# Patient Record
Sex: FEMALE | Race: BLACK OR AFRICAN AMERICAN | URBAN - METROPOLITAN AREA
[De-identification: names, ages, dates, MRNs, and addresses within clinical notes are randomized per-mention and may not be internally consistent; named-entity substitution may affect disease eponyms.]

---

## 2017-04-28 ENCOUNTER — OFFICE VISIT (OUTPATIENT)
Dept: ENDOCRINOLOGY | Age: 62
End: 2017-04-28

## 2017-04-28 VITALS
WEIGHT: 291 LBS | OXYGEN SATURATION: 98 % | SYSTOLIC BLOOD PRESSURE: 127 MMHG | BODY MASS INDEX: 49.68 KG/M2 | RESPIRATION RATE: 20 BRPM | HEART RATE: 77 BPM | HEIGHT: 64 IN | TEMPERATURE: 97.3 F | DIASTOLIC BLOOD PRESSURE: 60 MMHG

## 2017-04-28 DIAGNOSIS — E11.65 TYPE 2 DIABETES MELLITUS WITH HYPERGLYCEMIA, UNSPECIFIED LONG TERM INSULIN USE STATUS: Primary | ICD-10-CM

## 2017-04-28 DIAGNOSIS — E66.01 MORBID OBESITY DUE TO EXCESS CALORIES (HCC): ICD-10-CM

## 2017-04-28 DIAGNOSIS — I10 ESSENTIAL HYPERTENSION WITH GOAL BLOOD PRESSURE LESS THAN 130/80: ICD-10-CM

## 2017-04-28 DIAGNOSIS — E78.2 MIXED HYPERLIPIDEMIA: ICD-10-CM

## 2017-04-28 LAB — HBA1C MFR BLD HPLC: 6.7 %

## 2017-04-28 RX ORDER — FLUCONAZOLE 150 MG/1
150 TABLET ORAL DAILY
Qty: 1 TAB | Refills: 1 | Status: SHIPPED | OUTPATIENT
Start: 2017-04-28 | End: 2017-04-29

## 2017-04-28 RX ORDER — INSULIN LISPRO 100 [IU]/ML
INJECTION, SOLUTION INTRAVENOUS; SUBCUTANEOUS
Qty: 15 ML | Refills: 6 | Status: SHIPPED | OUTPATIENT
Start: 2017-04-28 | End: 2018-10-17 | Stop reason: SDUPTHER

## 2017-04-28 RX ORDER — INSULIN GLARGINE 100 [IU]/ML
INJECTION, SOLUTION SUBCUTANEOUS
Qty: 15 ML | Refills: 6 | Status: CANCELLED | OUTPATIENT
Start: 2017-04-28

## 2017-04-28 RX ORDER — ERGOCALCIFEROL 1.25 MG/1
50000 CAPSULE ORAL
Qty: 4 CAP | Refills: 6 | Status: SHIPPED | OUTPATIENT
Start: 2017-04-28

## 2017-04-28 NOTE — PROGRESS NOTES
HISTORY OF PRESENT ILLNESS  Guru Garcia is a 58 y.o. female. HPI  Patient here for f/u after last visit of Type 2 diabetes mellitus from Oct  2016    She saw Dr. Ezio Cuevas for back issues   She has severe DJD     She will be seeing rheumatologist for arthritis     She fell sick with viral infections  Did  get meter /log  Blood sugars are better per her             Old history :     Referred : by pcp- dr. Queen Hutchins    H/o diabetes for 12 years   Has seen Dr. Nita Pressley  Thus far       Current A1C is 11.1 % from March 2014  and symptoms/problems include none     Current diabetic medications include mix 50/50    30 units AM and 35 units PM and tradjenta . Current monitoring regimen: none  Home blood sugar records: trend: fluctuating a bit  Any episodes of hypoglycemia? yes - multiple    Weight trend: increasing steadily  Prior visit with dietician: no  Current diet: \"unhealthy\" diet in general  Current exercise: no regular exercise    Known diabetic complications: nephropathy and peripheral neuropathy  Cardiovascular risk factors: dyslipidemia, diabetes mellitus, obesity, sedentary life style, hypertension    Eye exam current (within one year): yes  SRINIVASA: no     Past Medical History:   Diagnosis Date    Asthma     DM (diabetes mellitus) ([facility] [address]     HTN (hypertension)      Past Surgical History:   Procedure Laterality Date    HX DILATION AND CURETTAGE  2011    HX TUBAL LIGATION  6/1985     Current Outpatient Prescriptions   Medication Sig    albuterol (PROAIR HFA) 90 mcg/actuation inhaler Take  by inhalation.  beclomethasone (QVAR) 40 mcg/actuation inhaler Take 1 Puff by inhalation two (2) times a day.  SITagliptin-metFORMIN (JANUMET XR) 50-1,000 mg TM24 Take 1 Tab by mouth two (2) times daily (with meals). To use with savings coupons    FARXIGA 10 mg tab TAKE 1 TABLET BY MOUTH EVERY DAY BEFORE BREAKFAST    glucose blood VI test strips (ONETOUCH VERIO) strip USE FOR TESTING FOUR TIMES DAILY.  Dx code E11.65    insulin glargine (LANTUS SOLOSTAR) 100 unit/mL (3 mL) pen 50 Units by SubCUTAneous route nightly. Stop Toujeo    insulin lispro (HUMALOG) 100 unit/mL kwikpen Increase to 8 units before breakfast, 8 units before lunch, and 10 units before dinner. Plus sliding scale. STOP MIXED INSULIN    VOLTAREN 1 % topical gel     Lancets (ONE TOUCH DELICA) misc Test 4 times daily. Dx code 250.00    ergocalciferol (VITAMIN D2) 50,000 unit capsule Take 1 Cap by mouth every seven (7) days.  meloxicam (MOBIC) 15 mg tablet Take 15 mg by mouth daily.  acetaminophen (TYLENOL) 325 mg tablet Take  by mouth every four (4) hours as needed for Pain. No current facility-administered medications for this visit. Review of Systems   Constitutional: Negative. HENT: Negative. Eyes: Negative for pain and redness. Respiratory: Negative. Cardiovascular: Negative for chest pain, palpitations and leg swelling. Gastrointestinal: Negative. Negative for constipation. Genitourinary: Negative. Musculoskeletal: Negative for myalgias. Skin: Negative. Neurological: Negative. Endo/Heme/Allergies: Negative. Psychiatric/Behavioral: Negative for depression and memory loss. The patient does not have insomnia. Physical Exam   Constitutional: She is oriented to person, place, and time. She appears well-developed and well-nourished. HENT:   Head: Normocephalic. Eyes: Conjunctivae and EOM are normal. Pupils are equal, round, and reactive to light. Neck: Normal range of motion. Neck supple. No JVD present. No tracheal deviation present. No thyromegaly present. Cardiovascular: Normal rate, regular rhythm and normal heart sounds. No murmur heard. Pulmonary/Chest: Breath sounds normal.   Abdominal: Soft. Bowel sounds are normal.   Musculoskeletal: Normal range of motion. Lymphadenopathy:     She has no cervical adenopathy. Neurological: She is alert and oriented to person, place, and time.  She has normal reflexes. Skin: Skin is warm. Psychiatric: She has a normal mood and affect. Lab Results   Component Value Date/Time    Hemoglobin A1c 6.4 10/25/2016 12:00 AM    Hemoglobin A1c 8.1 06/27/2016 12:00 AM    Hemoglobin A1c 7.9 09/16/2015 07:26 AM    Glucose 83 10/25/2016 12:00 AM    Glucose  06/16/2015 04:21 PM    Microalb/Creat ratio (ug/mg creat.) 5.6 10/25/2016 12:00 AM    LDL, calculated 97 10/25/2016 12:00 AM    Creatinine 0.78 10/25/2016 12:00 AM            ASSESSMENT and PLAN    1. Type 2 DM, uncontrolled : A1c is   6.7 %    From    April 2017   compared to   6.4 %    From oct 2016  compared to    8.1 %     From   June 2016  compared to   7.9 %     From sept 2015 compared to   8 %   From June 2015 compared to   9 %    From march 2015 compared to   8.1 %   From March 2015 compared to   7.6%     From  Dec 2014 compared to   8.3 %  From aug 2014 compared to  9.4 %   From May 2014 compared to over 11 %    Improved  glycemic control,  Stopped humalog mix 50/50  Stay  on  basal bolus in sept 2015     Body mass index is 49.95 kg/(m^2). Will try toujeo to soliqua to help her with weight loss      on farxiga to 10 mg a day with b-fast only   On  janumet xr bid send to pharmacy    Patient is advised about checking blood sugars 4 times a day and maintaining log book. The danger of having low blood sugars has been explained with inappropriate use of insulin  Patient voiced understanding and using the printed instructions at home. Hypoglycemia management has been explained to the patient. lab results and schedule of future lab studies reviewed with patient      2. Hypoglycemia :  Educated on treating the hypoglycemia. Discussed proper insulin use and prescribed    3. HTN : continue lisinopril. Patient is educated about importance of compliance with anti-hypertensives especially ARB/ACEI    4. Dyslipidemia : not on statins.  Lipids in control   Patient is educated about benefits and adverse effects of statins and explained how benefits outweigh risk. 5. use of aspirin to prevent MI and TIA's discussed      6. Diabetic complications :     A. Retinopathy-NO   educated on this complication,  regular f/u with ophthalmologist encouraged    B. Nephropathy - NO       C. Peripheral Neuropathy -   , mild   educated on this disease and indicated improvement with good and stable glycemic control    D. PAD : NO         E : CAD : NO      7. Hypothyroidism - no more a diagnosis    she is stopped from  taking  synthroid as she is euthyroid without it       8. .obesity  : Body mass index is 49.95 kg/(m^2).       Labs bnv in 3 months

## 2017-04-28 NOTE — MR AVS SNAPSHOT
Visit Information Date & Time Provider Department Dept. Phone Encounter #  
 4/28/2017  2:45 PM Akhil Damon MD [facility] [place] [facility] End[facility]ology [phone] [id number] Follow-up Instructions Return in about 3 months (around 7/28/2017). Upcoming Health Maintenance Date Due Hepatitis C Screening 1955 Pneumococcal 19-64 Medium Risk (1 of 1 - PPSV23) 2/10/1974 DTaP/Tdap/Td series (1 - Tdap) 2/10/1976 PAP AKA CERVICAL CYTOLOGY 2/10/1976 BREAST CANCER SCRN MAMMOGRAM 2/10/2005 FOBT Q 1 YEAR AGE 50-75 2/10/2005 ZOSTER VACCINE AGE 60> 2/10/2015 EYE EXAM RETINAL OR DILATED Q1 4/7/2016 FOOT EXAM Q1 6/11/2016 INFLUENZA AGE 9 TO ADULT 8/1/2016 HEMOGLOBIN A1C Q6M 4/25/2017 MICROALBUMIN Q1 10/25/2017 LIPID PANEL Q1 10/25/2017 Allergies as of 4/28/2017  Review Complete On: 4/28/2017 By: Akhil Damon MD  
 No Known Allergies Current Immunizations  Never Reviewed No immunizations on file. Not reviewed this visit You Were Diagnosed With   
  
 Codes Comments Type 2 diabetes mellitus with hyperglycemia, unspecified long term insulin use status    -  Primary ICD-10-CM: E11.65 ICD-9-CM: 250.00 Mixed hyperlipidemia     ICD-10-CM: E78.2 ICD-9-CM: 272.2 Essential hypertension with goal blood pressure less than 130/80     ICD-10-CM: I10 
ICD-9-CM: 401.9 Vitals BP Pulse Temp Resp Height(growth percentile) Weight(growth percentile) 127/60 77 97.3 °F (36.3 °C) (Oral) 20 5' 4\" (1.626 m) 291 lb (132 kg) SpO2 BMI OB Status Smoking Status 98% 49.95 kg/m2 Postmenopausal Never Smoker BMI and BSA Data Body Mass Index Body Surface Area  
 49.95 kg/m 2 2.44 m 2 Preferred Pharmacy Pharmacy Name Phone [address] AT [address] [place] [address]. [phone] Your Updated Medication List  
  
   
 This list is accurate as of: 4/28/17  3:54 PM.  Always use your most recent med list.  
  
  
  
  
 ergocalciferol 50,000 unit capsule Commonly known as:  VITAMIN D2 Take 1 Cap by mouth every seven (7) days. FARXIGA 10 mg Tab Generic drug:  dapagliflozin TAKE 1 TABLET BY MOUTH EVERY DAY BEFORE BREAKFAST  
  
 glucose blood VI test strips strip Commonly known as:  ONETOUCH VERIO  
USE FOR TESTING FOUR TIMES DAILY. Dx code E11.65  
  
 insulin glargine 100 unit/mL (3 mL) pen Commonly known as:  LANTUS SOLOSTAR  
50 Units by SubCUTAneous route nightly. Stop Toujeo  
  
 insulin lispro 100 unit/mL kwikpen Commonly known as:  HUMALOG Increase to 8 units before breakfast, 8 units before lunch, and 10 units before dinner. Plus sliding scale. STOP MIXED INSULIN Lancets Misc Commonly known as: One Touch Zollie Right Test 4 times daily. Dx code 250.00  
  
 meloxicam 15 mg tablet Commonly known as:  MOBIC Take 15 mg by mouth daily. PROAIR HFA 90 mcg/actuation inhaler Generic drug:  albuterol Take  by inhalation. QVAR 40 mcg/actuation ReliSen Generic drug:  beclomethasone Take 1 Puff by inhalation two (2) times a day. SITagliptin-metFORMIN 50-1,000 mg Tm24 Commonly known as:  JANUMET XR Take 1 Tab by mouth two (2) times daily (with meals). To use with savings coupons TYLENOL 325 mg tablet Generic drug:  acetaminophen Take  by mouth every four (4) hours as needed for Pain. VOLTAREN 1 % Gel Generic drug:  diclofenac We Performed the Following AMB POC HEMOGLOBIN A1C [63667 CPT(R)] Follow-up Instructions Return in about 3 months (around 7/28/2017). Patient Instructions Diflucan 150 mg 1 pill a day ( 2 refills ) 
 
 farxiga 10 mg before b-fast  
 
 janumet xr 50/ 1gm twice a day with meals Check blood sugars immediately before each meal and at bedtime ( print and mail ) Take  Tuojeo or Lantus  insulin  50 units  at bed time Start on  [place] at 20 units and come to 50 units ,  Stop Toujeo  
 
 
 humalog  insulin 8 units before breakfast, 8  units before lunch and 10  units before dinner. Also, add additional humalog  as follows with meals  If blood sugars are[de-identified] 
 
150-200 mg 1 units 201-250 mg 3 units 251-300 mg 5 units 301-350 mg 7 units 351-400 mg 9 units 401-450 mg 11 units 451-500 mg  13 units Less than 70 mg NO INSULIN 
 
 
 
 
Do not skip meals Do not eat in between meals Reduce carbs- pasta, rice, potatoes, bread Do not drink juices or sodas Donot eat peanut butter Do not eat muffins, biscuits Do not eat sugar free cookies and cakes Do not eat peaches, grapes, oranges and pine apples Introducing [facility] & HEALTH SERVICES! [place] [employer] introduces [employer] patient portal. Now you can access parts of your medical record, email your doctor's office, and request medication refills online. 1. In your internet browser, go to [email or web address] 2. Click on the First Time User? Click Here link in the Sign In box. You will see the New Member Sign Up page. 3. Enter your [employer] Access Code exactly as it appears below. You will not need to use this code after youve completed the sign-up process. If you do not sign up before the expiration date, you must request a new code. · [employer] Access Code: [email or web address] Expires: 7/27/2017  3:54 PM 
 
4. Enter the last four digits of your Social Security Number (xxxx) and Date of Birth (mm/dd/yyyy) as indicated and click Submit. You will be taken to the next sign-up page. 5. Create a BlogRadiot ID. This will be your [employer] login ID and cannot be changed, so think of one that is secure and easy to remember. 6. Create a [employer] password. You can change your password at any time. 7. Enter your Password Reset Question and Answer.  This can be used at a later time if you forget your password. 8. Enter your e-mail address. You will receive e-mail notification when new information is available in [address] 9. Click Sign Up. You can now view and download portions of your medical record. 10. Click the Download Summary menu link to download a portable copy of your medical information. If you have questions, please visit the Frequently Asked Questions section of the Pfenex website. Remember, P[employer] is NOT to be used for urgent needs. For medical emergencies, dial 911. Now available from your iPhone and Android! Please provide this summary of care documentation to your next provider. Your primary care clinician is listed as Arron Fountain. If you have any questions after today's visit, please call [phone].

## 2017-04-28 NOTE — PATIENT INSTRUCTIONS
Diflucan 150 mg 1 pill a day ( 2 refills )     farxiga 10 mg before b-fast      janumet xr 50/ 1gm twice a day with meals       Check blood sugars immediately before each meal and at bedtime ( print and mail )      Take  Tuojeo or Lantus  insulin  50 units  at bed time  Start on  Soliqua at 20 units and come to 50 units ,  Stop Toujeo        humalog  insulin 8 units before breakfast, 8  units before lunch and 10  units before dinner.     Also, add additional humalog  as follows with meals  If blood sugars are[de-identified]    150-200 mg 1 units    201-250 mg 3 units    251-300 mg 5 units    301-350 mg 7 units    351-400 mg 9 units    401-450 mg 11 units    451-500 mg  13 units     Less than 70 mg NO INSULIN          Do not skip meals  Do not eat in between meals    Reduce carbs- pasta, rice, potatoes, bread   Do not drink juices or sodas  Donot eat peanut butter   Do not eat muffins, biscuits   Do not eat sugar free cookies and cakes   Do not eat peaches, grapes, oranges and pine apples

## 2017-04-28 NOTE — PROGRESS NOTES
Wt Readings from Last 3 Encounters:   04/28/17 291 lb (132 kg)   10/28/16 290 lb (131.5 kg)   06/30/16 291 lb (132 kg)     Temp Readings from Last 3 Encounters:   04/28/17 97.3 °F (36.3 °C) (Oral)   10/28/16 98.3 °F (36.8 °C)   06/30/16 98.1 °F (36.7 °C) (Oral)     BP Readings from Last 3 Encounters:   04/28/17 127/60   10/28/16 127/53   06/30/16 136/63     Pulse Readings from Last 3 Encounters:   04/28/17 77   10/28/16 82   06/30/16 99     Lab Results   Component Value Date/Time    Hemoglobin A1c 6.4 10/25/2016 12:00 AM    Hemoglobin A1c (POC) 8.1 03/13/2015 01:50 PM     Last Podiatry April 2017  Last Eye exam Feb 2016

## 2017-08-29 ENCOUNTER — OFFICE VISIT (OUTPATIENT)
Dept: ENDOCRINOLOGY | Age: 62
End: 2017-08-29

## 2017-08-29 VITALS
HEART RATE: 70 BPM | HEIGHT: 64 IN | DIASTOLIC BLOOD PRESSURE: 55 MMHG | BODY MASS INDEX: 48.49 KG/M2 | SYSTOLIC BLOOD PRESSURE: 121 MMHG | WEIGHT: 284 LBS | TEMPERATURE: 97.7 F | RESPIRATION RATE: 14 BRPM

## 2017-08-29 DIAGNOSIS — E78.2 MIXED HYPERLIPIDEMIA: ICD-10-CM

## 2017-08-29 DIAGNOSIS — I10 ESSENTIAL HYPERTENSION WITH GOAL BLOOD PRESSURE LESS THAN 130/80: ICD-10-CM

## 2017-08-29 DIAGNOSIS — E11.65 TYPE 2 DIABETES MELLITUS WITH HYPERGLYCEMIA, UNSPECIFIED LONG TERM INSULIN USE STATUS: Primary | ICD-10-CM

## 2017-08-29 RX ORDER — FOLIC ACID 1 MG/1
TABLET ORAL DAILY
COMMUNITY
End: 2022-05-12 | Stop reason: ALTCHOICE

## 2017-08-29 RX ORDER — HYDROXYCHLOROQUINE SULFATE 200 MG/1
TABLET, FILM COATED ORAL
COMMUNITY
Start: 2017-05-04 | End: 2019-05-06 | Stop reason: ALTCHOICE

## 2017-08-29 RX ORDER — ALBUTEROL SULFATE 90 UG/1
AEROSOL, METERED RESPIRATORY (INHALATION)
COMMUNITY

## 2017-08-29 RX ORDER — ACETAMINOPHEN AND CODEINE PHOSPHATE 300; 30 MG/1; MG/1
1 TABLET ORAL
COMMUNITY
Start: 2017-08-07 | End: 2019-05-06 | Stop reason: ALTCHOICE

## 2017-08-29 RX ORDER — METHOTREXATE 2.5 MG/1
2.5 TABLET ORAL
COMMUNITY

## 2017-08-29 NOTE — MR AVS SNAPSHOT
Visit Information Date & Time Provider Department Dept. Phone Encounter #  
 8/29/2017 10:15 AM Sukhdev Carpenter MD [facility] [phone] [id number] Follow-up Instructions Return in about 6 months (around 2/28/2018). Upcoming Health Maintenance Date Due Hepatitis C Screening 1955 Pneumococcal 19-64 Medium Risk (1 of 1 - PPSV23) 2/10/1974 DTaP/Tdap/Td series (1 - Tdap) 2/10/1976 PAP AKA CERVICAL CYTOLOGY 2/10/1976 BREAST CANCER SCRN MAMMOGRAM 2/10/2005 FOBT Q 1 YEAR AGE 50-75 2/10/2005 ZOSTER VACCINE AGE 60> 12/10/2014 EYE EXAM RETINAL OR DILATED Q1 4/7/2016 FOOT EXAM Q1 6/11/2016 INFLUENZA AGE 9 TO ADULT 8/1/2017 HEMOGLOBIN A1C Q6M 2/24/2018 MICROALBUMIN Q1 8/24/2018 LIPID PANEL Q1 8/24/2018 Allergies as of 8/29/2017  Review Complete On: 8/29/2017 By: Sukhdev Carpenter MD  
  
 Severity Noted Reaction Type Reactions Penicillins High 06/13/2017    Itching Tramadol High 06/13/2017    Hives, Itching Current Immunizations  Never Reviewed No immunizations on file. Not reviewed this visit You Were Diagnosed With   
  
 Codes Comments Type 2 diabetes mellitus with hyperglycemia, unspecified long term insulin use status (HCC)    -  Primary ICD-10-CM: E11.65 ICD-9-CM: 250.00 Essential hypertension with goal blood pressure less than 130/80     ICD-10-CM: I10 
ICD-9-CM: 401.9 Mixed hyperlipidemia     ICD-10-CM: E78.2 ICD-9-CM: 272.2 Vitals BP Pulse Temp Resp Height(growth percentile) Weight(growth percentile) 121/55 (BP 1 Location: Left arm, BP Patient Position: Sitting) 70 97.7 °F (36.5 °C) (Oral) 14 5' 4\" (1.626 m) 284 lb (128.8 kg) BMI OB Status Smoking Status 48.75 kg/m2 Postmenopausal Never Smoker BMI and BSA Data Body Mass Index Body Surface Area 48.75 kg/m 2 2.41 m 2 Preferred Pharmacy Pharmacy Name Phone [address] AT [address] [place] [address]. [phone] Your Updated Medication List  
  
   
This list is accurate as of: 8/29/17 11:17 AM.  Always use your most recent med list.  
  
  
  
  
 acetaminophen-codeine 300-30 mg per tablet Commonly known as:  TYLENOL #3 Take 1 Tab by mouth every six (6) hours as needed. * ergocalciferol 50,000 unit capsule Commonly known as:  VITAMIN D2 Take 1 Cap by mouth every seven (7) days. * ergocalciferol 50,000 unit capsule Commonly known as:  DRISDOL Take 1 Cap by mouth every seven (7) days. FARXIGA 10 mg Tab Generic drug:  dapagliflozin TAKE 1 TABLET BY MOUTH EVERY DAY BEFORE BREAKFAST  
  
 folic acid 1 mg tablet Commonly known as:  Google Take  by mouth daily. hydroxychloroquine 200 mg tablet Commonly known as:  PLAQUENIL  
TK 1 T PO Q 12 H  
  
 insulin glargine-lixisenatide 100 unit-33 mcg/mL Inpn Commonly known as:  SOLIQUA 100/33  
50 Units by SubCUTAneous route nightly. insulin lispro 100 unit/mL kwikpen Commonly known as:  HUMALOG Increase to 8 units before breakfast, 8 units before lunch, and 10 units before dinner. Plus sliding scale. STOP MIXED INSULIN Lancets Misc Commonly known as: One Touch Néstor Buttery Test 4 times daily. Dx code 250.00  
  
 meloxicam 15 mg tablet Commonly known as:  MOBIC Take 15 mg by mouth daily. methotrexate 2.5 mg tablet Commonly known as:  [place] Glance Take 12.5 mg by mouth every Monday. ONETOUCH VERIO strip Generic drug:  glucose blood VI test strips USE FOR TESTING FOUR TIMES DAILY PROAIR HFA 90 mcg/actuation inhaler Generic drug:  albuterol Take  by inhalation. QVAR 40 mcg/actuation [employer] Generic drug:  beclomethasone Take 1 Puff by inhalation two (2) times a day. SITagliptin-metFORMIN 50-1,000 mg Tm24 Commonly known as:  JANUMET XR  
 Take 1 Tab by mouth two (2) times daily (with meals). To use with savings coupons TYLENOL 325 mg tablet Generic drug:  acetaminophen Take  by mouth every four (4) hours as needed for Pain. VOLTAREN 1 % Gel Generic drug:  diclofenac * Notice: This list has 2 medication(s) that are the same as other medications prescribed for you. Read the directions carefully, and ask your doctor or other care provider to review them with you. Prescriptions Sent to Pharmacy Refills  
 insulin glargine-lixisenatide (SOLIQUA 100/33) 100 unit-33 mcg/mL inpn 6 Si Units by SubCUTAneous route nightly. Class: Normal  
 Pharmacy: [facility] [address], [address] [place] [address] AT [address] [facility]. Ph #: [phone] Route: SubCUTAneous Follow-up Instructions Return in about 6 months (around 2018). Patient Instructions   
 
 
 
 farxiga 10 mg before b-fast ( might need change to jardiance 25 mg a day - pt will call ) 
 
 janumet xr 50/ 1gm twice a day with meals Check blood sugars immediately before each meal and at bedtime ( print and mail ) Take  Lantus  insulin  50 units  at bed time OR Soliqua   50   At bed time  
 
humalog  insulin 8 units before breakfast, 8  units before lunch and 10  units before dinner. Also, add additional humalog  as follows with meals  If blood sugars are[de-identified] 
 
150-200 mg 1 units 201-250 mg 3 units 251-300 mg 5 units 301-350 mg 7 units 351-400 mg 9 units 401-450 mg 11 units 451-500 mg  13 units Less than 70 mg NO INSULIN 
 
 
 
 
Do not skip meals Do not eat in between meals Reduce carbs- pasta, rice, potatoes, bread Do not drink juices or sodas Donot eat peanut butter Do not eat muffins, biscuits Do not eat sugar free cookies and cakes Do not eat peaches, grapes, oranges and pine apples Introducing [facility] & HE[facility] SERVICES! [employer] introduces [employer] patient portal. Now you can access parts of your medical record, email your doctor's office, and request medication refills online. 1. In your internet browser, go to [email or web address] 2. Click on the First Time User? Click Here link in the Sign In box. You will see the New Member Sign Up page. 3. Enter your [employer]en Access Code exactly as it appears below. You will not need to use this code after youve completed the sign-up process. If you do not sign up before the expiration date, you must request a new code. · [employer] Access Code: [email or web address] Expires: 11/27/2017 11:17 AM 
 
4. Enter the last four digits of your Social Security Number (xxxx) and Date of Birth (mm/dd/yyyy) as indicated and click Submit. You will be taken to the next sign-up page. 5. Create a Sweeten ID. This will be your Sweeten login ID and cannot be changed, so think of one that is secure and easy to remember. 6. Create a Sweeten password. You can change your password at any time. 7. Enter your Password Reset Question and Answer. This can be used at a later time if you forget your password. 8. Enter your e-mail address. You will receive e-mail notification when new information is available in [address] 9. Click Sign Up. You can now view and download portions of your medical record. 10. Click the Download Summary menu link to download a portable copy of your medical information. If you have questions, please visit the Frequently Asked Questions section of the Sweeten website. Remember, [employer] is NOT to be used for urgent needs. For medical emergencies, dial 911. Now available from your iPhone and Android! Please provide this summary of care documentation to your next provider. Your primary care clinician is listed as Violeta Pierre. If you have any questions after today's visit, please call [phone].

## 2017-08-29 NOTE — PROGRESS NOTES
Wt Readings from Last 3 Encounters:   08/29/17 284 lb (128.8 kg)   04/28/17 291 lb (132 kg)   10/28/16 290 lb (131.5 kg)     Temp Readings from Last 3 Encounters:   08/29/17 97.7 °F (36.5 °C) (Oral)   04/28/17 97.3 °F (36.3 °C) (Oral)   10/28/16 98.3 °F (36.8 °C)     BP Readings from Last 3 Encounters:   08/29/17 121/55   04/28/17 127/60   10/28/16 127/53     Pulse Readings from Last 3 Encounters:   08/29/17 70   04/28/17 77   10/28/16 82     Lab Results   Component Value Date/Time    Hemoglobin A1c 6.7 08/24/2017 12:00 AM    Hemoglobin A1c (POC) 6.7 04/28/2017 03:34 PM

## 2017-08-29 NOTE — PROGRESS NOTES
HISTORY OF PRESENT ILLNESS  Ashtyn Ohara is a 58 y.o. female. HPI  Patient here for f/u after last visit of Type 2 diabetes mellitus from April 2017       She has severe DJD   She is diagnosed with RA    She fell sick with viral infections  Did  get meter /log    Lost 7 lbs   Blood sugars are better             Old history :     Referred : by pcp- dr. Berny Lopez    H/o diabetes for 12 years   Has seen Dr. Adrianne Schuster  Thus far       Current A1C is 11.1 % from March 2014  and symptoms/problems include none     Current diabetic medications include mix 50/50    30 units AM and 35 units PM and tradjenta . Current monitoring regimen: none  Home blood sugar records: trend: fluctuating a bit  Any episodes of hypoglycemia? yes - multiple    Weight trend: increasing steadily  Prior visit with dietician: no  Current diet: \"unhealthy\" diet in general  Current exercise: no regular exercise    Known diabetic complications: nephropathy and peripheral neuropathy  Cardiovascular risk factors: dyslipidemia, diabetes mellitus, obesity, sedentary life style, hypertension    Eye exam current (within one year): yes  SRINIVASA: no     Past Medical History:   Diagnosis Date    Asthma     DM (diabetes mellitus) ([facility] [address]     HTN (hypertension)      Past Surgical History:   Procedure Laterality Date    HX CARPAL TUNNEL RELEASE      both hands    HX DILATION AND CURETTAGE  2011    HX TUBAL LIGATION  6/1985     Current Outpatient Prescriptions   Medication Sig    albuterol (PROAIR HFA) 90 mcg/actuation inhaler Take  by inhalation.  hydroxychloroquine (PLAQUENIL) 200 mg tablet TK 1 T PO Q 12 H    folic acid (FOLVITE) 1 mg tablet Take  by mouth daily.  methotrexate (RHEUMATREX) 2.5 mg tablet Take 12.5 mg by mouth every Monday.  acetaminophen-codeine (TYLENOL #3) 300-30 mg per tablet Take 1 Tab by mouth every six (6) hours as needed.     ONETOUCH VERIO strip USE FOR TESTING FOUR TIMES DAILY    LANTUS SOLOSTAR 100 unit/mL (3 mL) inpn ADMINISTER 50 UNITS UNDER THE SKIN EVERY EVENING. STOP TOUJEO    FARXIGA 10 mg tab TAKE 1 TABLET BY MOUTH EVERY DAY BEFORE BREAKFAST    insulin lispro (HUMALOG) 100 unit/mL kwikpen Increase to 8 units before breakfast, 8 units before lunch, and 10 units before dinner. Plus sliding scale. STOP MIXED INSULIN    ergocalciferol (DRISDOL) 50,000 unit capsule Take 1 Cap by mouth every seven (7) days.  beclomethasone (QVAR) 40 mcg/actuation inhaler Take 1 Puff by inhalation two (2) times a day.  SITagliptin-metFORMIN (JANUMET XR) 50-1,000 mg TM24 Take 1 Tab by mouth two (2) times daily (with meals). To use with savings coupons    VOLTAREN 1 % topical gel     Lancets (ONE TOUCH DELICA) misc Test 4 times daily. Dx code 250.00    acetaminophen (TYLENOL) 325 mg tablet Take  by mouth every four (4) hours as needed for Pain.  ergocalciferol (VITAMIN D2) 50,000 unit capsule Take 1 Cap by mouth every seven (7) days.  meloxicam (MOBIC) 15 mg tablet Take 15 mg by mouth daily. No current facility-administered medications for this visit. Review of Systems   Constitutional: Negative. HENT: Negative. Eyes: Negative for pain and redness. Respiratory: Negative. Cardiovascular: Negative for chest pain, palpitations and leg swelling. Gastrointestinal: Negative. Negative for constipation. Genitourinary: Negative. Musculoskeletal: Negative for myalgias. Skin: Negative. Neurological: Negative. Endo/Heme/Allergies: Negative. Psychiatric/Behavioral: Negative for depression and memory loss. The patient does not have insomnia. Physical Exam   Constitutional: She is oriented to person, place, and time. She appears well-developed and well-nourished. HENT:   Head: Normocephalic. Eyes: Conjunctivae and EOM are normal. Pupils are equal, round, and reactive to light. Neck: Normal range of motion. Neck supple. No JVD present. No tracheal deviation present. No thyromegaly present. Cardiovascular: Normal rate, regular rhythm and normal heart sounds. No murmur heard. Pulmonary/Chest: Breath sounds normal.   Abdominal: Soft. Bowel sounds are normal.   Musculoskeletal: Normal range of motion. Lymphadenopathy:     She has no cervical adenopathy. Neurological: She is alert and oriented to person, place, and time. She has normal reflexes. Skin: Skin is warm. Psychiatric: She has a normal mood and affect. Lab Results   Component Value Date/Time    Hemoglobin A1c 6.7 08/24/2017 12:00 AM    Hemoglobin A1c 6.4 10/25/2016 12:00 AM    Hemoglobin A1c 8.1 06/27/2016 12:00 AM    Glucose 84 08/24/2017 12:00 AM    Glucose  06/16/2015 04:21 PM    Microalb/Creat ratio (ug/mg creat.) 6.0 08/24/2017 12:00 AM    LDL, calculated 89 08/24/2017 12:00 AM    Creatinine 0.74 08/24/2017 12:00 AM            ASSESSMENT and PLAN    1. Type 2 DM, uncontrolled : A1c is   6.7 %    From  Aug 2017  Compared to  6.4 %     From    April 2017   compared to   6.4 %    From oct 2016  compared to    8.1 %     From   June 2016  compared to   7.9 %     From sept 2015 compared to   8 %   From June 2015 compared to   9 %    From march 2015 compared to   8.1 %   From March 2015 compared to   7.6%     From  Dec 2014 compared to   8.3 %  From aug 2014 compared to  9.4 %   From May 2014 compared to over 11 %    Improved  glycemic control,  Stopped humalog mix 50/50  Stay  on  basal bolus in sept 2015     Body mass index is 48.75 kg/(m^2). Will try toujeo to soliqua to help her with weight loss      on farxiga to 10 mg a day with b-fast only   On  janumet xr bid send to pharmacy    Patient is advised about checking blood sugars 4 times a day and maintaining log book. The danger of having low blood sugars has been explained with inappropriate use of insulin  Patient voiced understanding and using the printed instructions at home. Hypoglycemia management has been explained to the patient.        lab results and schedule of future lab studies reviewed with patient      2. Hypoglycemia :  Educated on treating the hypoglycemia. Discussed proper insulin use and prescribed    3. HTN : continue lisinopril. Patient is educated about importance of compliance with anti-hypertensives especially ARB/ACEI    4. Dyslipidemia : not on statins. Lipids in control   Patient is educated about benefits and adverse effects of statins and explained how benefits outweigh risk. 5. use of aspirin to prevent MI and TIA's discussed      6. Diabetic complications :     A. Retinopathy-NO   educated on this complication,  regular f/u with ophthalmologist encouraged    B. Nephropathy - NO       C. Peripheral Neuropathy -   , mild   educated on this disease and indicated improvement with good and stable glycemic control    D. PAD : NO         E : CAD : NO      7. Hypothyroidism - no more a diagnosis    she is stopped from  taking  synthroid as she is euthyroid without it       8. .obesity  : Body mass index is 48.75 kg/(m^2).       Labs bnv in 3 months

## 2017-08-29 NOTE — PATIENT INSTRUCTIONS
farxiga 10 mg before b-fast ( might need change to jardiance 25 mg a day - pt will call )     janumet xr 50/ 1gm twice a day with meals       Check blood sugars immediately before each meal and at bedtime ( print and mail )      Take  Lantus  insulin  50 units  at bed time  OR  Soliqua   50   At bed time     humalog  insulin 8 units before breakfast, 8  units before lunch and 10  units before dinner.     Also, add additional humalog  as follows with meals  If blood sugars are[de-identified]    150-200 mg 1 units    201-250 mg 3 units    251-300 mg 5 units    301-350 mg 7 units    351-400 mg 9 units    401-450 mg 11 units    451-500 mg  13 units     Less than 70 mg NO INSULIN          Do not skip meals  Do not eat in between meals    Reduce carbs- pasta, rice, potatoes, bread   Do not drink juices or sodas  Donot eat peanut butter   Do not eat muffins, biscuits   Do not eat sugar free cookies and cakes   Do not eat peaches, grapes, oranges and pine apples

## 2017-09-07 ENCOUNTER — TELEPHONE (OUTPATIENT)
Dept: ENDOCRINOLOGY | Age: 62
End: 2017-09-07

## 2017-09-08 NOTE — TELEPHONE ENCOUNTER
Spoke with patient and informed her PA was denied for [place]. Informed patient she can savings card to get medication. Patient stated she was given a savings card at office visit but thought it had to be approved through insurance first. Explained to patient she needs to activate card and take to pharmacy and can get medication. She verbalized understanding. Patient will call back with any issues getting medication.

## 2017-10-19 ENCOUNTER — TELEPHONE (OUTPATIENT)
Dept: ENDOCRINOLOGY | Age: 62
End: 2017-10-19

## 2017-11-08 ENCOUNTER — TELEPHONE (OUTPATIENT)
Dept: ENDOCRINOLOGY | Age: 62
End: 2017-11-08

## 2017-11-08 NOTE — TELEPHONE ENCOUNTER
Can she wait until march visit, if not to pre pone to Blane Hernández   I believe she can wait for this until march 2018

## 2017-11-08 NOTE — TELEPHONE ENCOUNTER
Spoke with patient and she stated she was recently diagnosed with rheumatoid arthritis. She saw her foot Dr. Petty Sagastume and he told her she qualifies for diabetic shoes and told her to contact the physician who treats diabetes in regards to this. Patient is asking what needs to be done in order to get the diabetic shoes. Patient does not have f/u until March. Please advise.

## 2017-11-09 ENCOUNTER — TELEPHONE (OUTPATIENT)
Dept: ENDOCRINOLOGY | Age: 62
End: 2017-11-09

## 2017-11-09 NOTE — TELEPHONE ENCOUNTER
Attempted to call patient in regards to speaking with pharmacy and her medication. Left message and call back number.

## 2017-11-09 NOTE — TELEPHONE ENCOUNTER
Spoke with patient and she stated she is trying to get the diabetic shoes now because she has met deductible and could get them at no cost. Appointment made for 12/11/17 at 1115. Patient also stated was not able to get prescriptions from pharmacy last night. Trying to use savings cards for [employer] and [address] and pharmacist told her they were not going through. Will call pharmacy and update patient sometime today or tomorrow. Patient verbalized understanding.

## 2017-12-11 ENCOUNTER — OFFICE VISIT (OUTPATIENT)
Dept: ENDOCRINOLOGY | Age: 62
End: 2017-12-11

## 2017-12-11 VITALS
HEART RATE: 88 BPM | SYSTOLIC BLOOD PRESSURE: 147 MMHG | DIASTOLIC BLOOD PRESSURE: 66 MMHG | WEIGHT: 290.8 LBS | TEMPERATURE: 97.3 F | HEIGHT: 64 IN | RESPIRATION RATE: 16 BRPM | OXYGEN SATURATION: 98 % | BODY MASS INDEX: 49.64 KG/M2

## 2017-12-11 DIAGNOSIS — E78.2 MIXED HYPERLIPIDEMIA: ICD-10-CM

## 2017-12-11 DIAGNOSIS — I10 ESSENTIAL HYPERTENSION WITH GOAL BLOOD PRESSURE LESS THAN 130/80: ICD-10-CM

## 2017-12-11 DIAGNOSIS — E11.65 TYPE 2 DIABETES MELLITUS WITH HYPERGLYCEMIA, UNSPECIFIED LONG TERM INSULIN USE STATUS: Primary | ICD-10-CM

## 2017-12-11 DIAGNOSIS — E66.01 OBESITY, MORBID (HCC): ICD-10-CM

## 2017-12-11 NOTE — PROGRESS NOTES
Chief Complaint   Patient presents with    Diabetes     pt stated she is here to complete a foot exam for some dm shoes     1. Have you been to the ER, urgent care clinic since your last visit? Hospitalized since your last visit? No    2. Have you seen or consulted any other health care providers outside of the [address] [place] [address] since your last visit? Include any pap smears or colon screening.  No       Wt Readings from Last 3 Encounters:   12/11/17 290 lb 12.8 oz (131.9 kg)   08/29/17 284 lb (128.8 kg)   04/28/17 291 lb (132 kg)     Temp Readings from Last 3 Encounters:   12/11/17 97.3 °F (36.3 °C) (Oral)   08/29/17 97.7 °F (36.5 °C) (Oral)   04/28/17 97.3 °F (36.3 °C) (Oral)     BP Readings from Last 3 Encounters:   12/11/17 147/66   08/29/17 121/55   04/28/17 127/60     Pulse Readings from Last 3 Encounters:   12/11/17 88   08/29/17 70   04/28/17 77     Pt do not have meter  Pt had a foot and eye exam this year

## 2017-12-11 NOTE — MR AVS SNAPSHOT
[address] [facility] [address] [facility] [address] 
[phone] Patient: Alonso Cruz MRN: [id number] SNT:[id number] Visit Information Date & Time Provider Department Dept. Phone Encounter #  
 12/11/2017 11:15 AM Kenya Junior MD [facility] [phone] [id number] Your Appointments 3/2/2018  3:45 PM  
ROUTINE CARE with Kenya Junior MD  
[facility] [address] [facility]) Appt Note: 6 mo fu dm II  
 [address] [facility] [address]  
[phone]  
  
   
 [address] [place] [id number] Upcoming Health Maintenance Date Due Hepatitis C Screening 1955 Pneumococcal 19-64 Medium Risk (1 of 1 - PPSV23) 2/10/1974 DTaP/Tdap/Td series (1 - Tdap) 2/10/1976 PAP AKA CERVICAL CYTOLOGY 2/10/1976 BREAST CANCER SCRN MAMMOGRAM 2/10/2005 FOBT Q 1 YEAR AGE 50-75 2/10/2005 ZOSTER VACCINE AGE 60> 12/10/2014 EYE EXAM RETINAL OR DILATED Q1 4/7/2016 FOOT EXAM Q1 6/11/2016 Influenza Age 5 to Adult 8/1/2017 HEMOGLOBIN A1C Q6M 2/24/2018 MICROALBUMIN Q1 8/24/2018 LIPID PANEL Q1 8/24/2018 Allergies as of 12/11/2017  Review Complete On: 12/11/2017 By: Kenya Junior MD  
  
 Severity Noted Reaction Type Reactions Penicillins High 06/13/2017    Itching Tramadol High 06/13/2017    Hives, Itching Current Immunizations  Never Reviewed No immunizations on file. Not reviewed this visit You Were Diagnosed With   
  
 Codes Comments Type 2 diabetes mellitus with hyperglycemia, unspecified long term insulin use status (HCC)    -  Primary ICD-10-CM: E11.65 ICD-9-CM: 250.00 Obesity, morbid ([address]     ICD-10-CM: E66.01 
ICD-9-CM: 278.01 Essential hypertension with goal blood pressure less than 130/80     ICD-10-CM: I10 
ICD-9-CM: 401.9 Mixed hyperlipidemia     ICD-10-CM: E78.2 ICD-9-CM: 272.2 Vitals BP Pulse Temp Resp Height(growth percentile) Weight(growth percentile) 147/66 (BP 1 Location: Left arm, BP Patient Position: Sitting) 88 97.3 °F (36.3 °C) (Oral) 16 5' 4\" (1.626 m) 290 lb 12.8 oz (131.9 kg) SpO2 BMI OB Status Smoking Status 98% 49.92 kg/m2 Postmenopausal Never Smoker BMI and BSA Data Body Mass Index Body Surface Area  
 49.92 kg/m 2 2.44 m 2 Preferred Pharmacy Pharmacy Name Phone [address] AT [address] [place] [address]. [phone] Your Updated Medication List  
  
   
This list is accurate as of: 12/11/17 12:12 PM.  Always use your most recent med list.  
  
  
  
  
 acetaminophen-codeine 300-30 mg per tablet Commonly known as:  TYLENOL #3 Take 1 Tab by mouth every six (6) hours as needed. empagliflozin 25 mg tablet Commonly known as:  Tone Roberts Take 1 Tab by mouth daily. Stop farxiaga * ergocalciferol 50,000 unit capsule Commonly known as:  VITAMIN D2 Take 1 Cap by mouth every seven (7) days. * ergocalciferol 50,000 unit capsule Commonly known as:  DRISDOL Take 1 Cap by mouth every seven (7) days. folic acid 1 mg tablet Commonly known as:  [employer] Take  by mouth daily. hydroxychloroquine 200 mg tablet Commonly known as:  PLAQUENIL  
TK 1 T PO Q 12 H  
  
 insulin glargine-lixisenatide 100 unit-33 mcg/mL Inpn Commonly known as:  SOLIQUA 100/33  
50 Units by SubCUTAneous route nightly. Stop xultophy  
  
 insulin lispro 100 unit/mL kwikpen Commonly known as:  HUMALOG Increase to 8 units before breakfast, 8 units before lunch, and 10 units before dinner. Plus sliding scale. STOP MIXED INSULIN Lancets Misc Commonly known as: One Touch Jai Tillatoba Test 4 times daily. Dx code 250.00  
  
 meloxicam 15 mg tablet Commonly known as:  MOBIC Take 15 mg by mouth daily. methotrexate 2.5 mg tablet Commonly known as:  Su Bustos Take 12.5 mg by mouth every Monday. ONETOUCH VERIO strip Generic drug:  glucose blood VI test strips USE FOR TESTING FOUR TIMES DAILY PROAIR HFA 90 mcg/actuation inhaler Generic drug:  albuterol Take  by inhalation. QVAR 40 mcg/actuation [employer] Generic drug:  beclomethasone Take 1 Puff by inhalation two (2) times a day. SITagliptin-metFORMIN 50-1,000 mg Tm24 Commonly known as:  JANUMET XR Take 1 Tab by mouth two (2) times daily (with meals). To use with savings coupons TYLENOL 325 mg tablet Generic drug:  acetaminophen Take  by mouth every four (4) hours as needed for Pain. VOLTAREN 1 % Gel Generic drug:  diclofenac * Notice: This list has 2 medication(s) that are the same as other medications prescribed for you. Read the directions carefully, and ask your doctor or other care provider to review them with you. Prescriptions Printed Refills  
 insulin glargine-lixisenatide (SOLIQUA 100/33) 100 unit-33 mcg/mL inpn 6 Si Units by SubCUTAneous route nightly. Stop xultophy Class: Print Route: SubCUTAneous Prescriptions Sent to Pharmacy Refills  
 empagliflozin (JARDIANCE) 25 mg tablet 6 Sig: Take 1 Tab by mouth daily. Stop farxiaga Class: Normal  
 Pharmacy: [facility] [address] [place] [address] AT [address] [facility].  #: [phone] Route: Oral  
  
Patient Instructions   
 
 
 
 jardiance 25 mg a day - stop farxiga  
 
 janumet xr 50/ 1gm twice a day with meals Check blood sugars immediately before each meal and at bedtime ( print and mail ) Soliqua   50   At bed time  
 
humalog  insulin 8 units before breakfast, 8  units before lunch and 10  units before dinner. Also, add additional humalog  as follows with meals  If blood sugars are[de-identified] 
 
150-200 mg 1 units 201-250 mg 3 units 251-300 mg 5 units 301-350 mg 7 units 351-400 mg 9 units 401-450 mg 11 units 451-500 mg  13 units Less than 70 mg NO INSULIN 
 
 
 
 
Do not skip meals Do not eat in between meals Reduce carbs- pasta, rice, potatoes, bread Do not drink juices or sodas Donot eat peanut butter Do not eat muffins, biscuits Do not eat sugar free cookies and cakes Do not eat peaches, grapes, oranges and pine apples Introducing [facility]! Carley Rios introduces AllClear ID patient portal. Now you can access parts of your medical record, email your doctor's office, and request medication refills online. 1. In your internet browser, go to [email or web address] 2. Click on the First Time User? Click Here link in the Sign In box. You will see the New Member Sign Up page. 3. Enter your AllClear ID Access Code exactly as it appears below. You will not need to use this code after youve completed the sign-up process. If you do not sign up before the expiration date, you must request a new code. · AllClear ID Access Code: [email or web address] Expires: 3/11/2018 12:12 PM 
 
4. Enter the last four digits of your Social Security Number (xxxx) and Date of Birth (mm/dd/yyyy) as indicated and click Submit. You will be taken to the next sign-up page. 5. Create a AllClear ID ID. This will be your [employer]Clear ID login ID and cannot be changed, so think of one that is secure and easy to remember. 6. Create a [employer] ID password. You can change your password at any time. 7. Enter your Password Reset Question and Answer. This can be used at a later time if you forget your password. 8. Enter your e-mail address. You will receive e-mail notification when new information is available in [address] 9. Click Sign Up. You can now view and download portions of your medical record. 10. Click the Download Summary menu link to download a portable copy of your medical information.  
 
If you have questions, please visit the Frequently Asked Questions section of the [employer]. Remember, [employer] is NOT to be used for urgent needs. For medical emergencies, dial 911. Now available from your iPhone and Android! Please provide this summary of care documentation to your next provider. Your primary care clinician is listed as Raheel Nicely. If you have any questions after today's visit, please call [phone].

## 2017-12-11 NOTE — PROGRESS NOTES
HISTORY OF PRESENT ILLNESS  Janis Brower is a 58 y.o. female. HPI  Patient here for f/u after last visit of Type 2 diabetes mellitus from Aug 29  2017     She is here for feet exam for diabetic shoes   She has severe DJD   She is diagnosed with RA    Did  get meter /log    Gained 7  lbs   Blood sugars are better             Old history :     Referred : by pcp- dr. Claudio Tom    H/o diabetes for 12 years   Has seen Dr. Jose Carlos Bailey  Thus far       Current A1C is 11.1 % from March 2014  and symptoms/problems include none     Current diabetic medications include mix 50/50    30 units AM and 35 units PM and tradjenta . Current monitoring regimen: none  Home blood sugar records: trend: fluctuating a bit  Any episodes of hypoglycemia? yes - multiple    Weight trend: increasing steadily  Prior visit with dietician: no  Current diet: \"unhealthy\" diet in general  Current exercise: no regular exercise    Known diabetic complications: nephropathy and peripheral neuropathy  Cardiovascular risk factors: dyslipidemia, diabetes mellitus, obesity, sedentary life style, hypertension    Eye exam current (within one year): yes  SRINIVASA: no     Past Medical History:   Diagnosis Date    Asthma     DM (diabetes mellitus) ([facility] [address]     HTN (hypertension)      Past Surgical History:   Procedure Laterality Date    HX CARPAL TUNNEL RELEASE      both hands    HX DILATION AND CURETTAGE  2011    HX TUBAL LIGATION  6/1985     Current Outpatient Prescriptions   Medication Sig    insulin degludec-liraglutide (XULTOPHY 100/3.6) 100 unit-3.6 mg /mL (3 mL) inpn 50 Units by SubCUTAneous route daily.  albuterol (PROAIR HFA) 90 mcg/actuation inhaler Take  by inhalation.  hydroxychloroquine (PLAQUENIL) 200 mg tablet TK 1 T PO Q 12 H    folic acid (FOLVITE) 1 mg tablet Take  by mouth daily.  methotrexate (RHEUMATREX) 2.5 mg tablet Take 12.5 mg by mouth every Monday.     acetaminophen-codeine (TYLENOL #3) 300-30 mg per tablet Take 1 Tab by mouth every six (6) hours as needed.  ONETOUCH VERIO strip USE FOR TESTING FOUR TIMES DAILY    FARXIGA 10 mg tab TAKE 1 TABLET BY MOUTH EVERY DAY BEFORE BREAKFAST    insulin lispro (HUMALOG) 100 unit/mL kwikpen Increase to 8 units before breakfast, 8 units before lunch, and 10 units before dinner. Plus sliding scale. STOP MIXED INSULIN    ergocalciferol (DRISDOL) 50,000 unit capsule Take 1 Cap by mouth every seven (7) days.  beclomethasone (QVAR) 40 mcg/actuation inhaler Take 1 Puff by inhalation two (2) times a day.  SITagliptin-metFORMIN (JANUMET XR) 50-1,000 mg TM24 Take 1 Tab by mouth two (2) times daily (with meals). To use with savings coupons    VOLTAREN 1 % topical gel     Lancets (ONE TOUCH DELICA) misc Test 4 times daily. Dx code 250.00    ergocalciferol (VITAMIN D2) 50,000 unit capsule Take 1 Cap by mouth every seven (7) days.  acetaminophen (TYLENOL) 325 mg tablet Take  by mouth every four (4) hours as needed for Pain.  meloxicam (MOBIC) 15 mg tablet Take 15 mg by mouth daily. No current facility-administered medications for this visit. Review of Systems   Constitutional: Negative. HENT: Negative. Eyes: Negative for pain and redness. Respiratory: Negative. Cardiovascular: Negative for chest pain, palpitations and leg swelling. Gastrointestinal: Negative. Negative for constipation. Genitourinary: Negative. Musculoskeletal: Negative for myalgias. Skin: Negative. Neurological: Negative. Endo/Heme/Allergies: Negative. Psychiatric/Behavioral: Negative for depression and memory loss. The patient does not have insomnia. Physical Exam   Constitutional: She is oriented to person, place, and time. She appears well-developed and well-nourished. HENT:   Head: Normocephalic. Eyes: Conjunctivae and EOM are normal. Pupils are equal, round, and reactive to light. Neck: Normal range of motion. Neck supple. No JVD present. No tracheal deviation present.  No thyromegaly present. Cardiovascular: Normal rate, regular rhythm and normal heart sounds. No murmur heard. Pulmonary/Chest: Breath sounds normal.   Abdominal: Soft. Bowel sounds are normal.   Musculoskeletal: Normal range of motion. Lymphadenopathy:     She has no cervical adenopathy. Neurological: She is alert and oriented to person, place, and time. She has normal reflexes. Skin: Skin is warm. Psychiatric: She has a normal mood and affect. Diabetic feet exam : dec 2017   Dr. Shanita Humphreys      H/o partial or complete amputation of foot : n  H/o previous foot ulceration : n  H/o pre - ulcerative callus : y  H/o peripheral neuropathy and callus : yes  H/o poor circulation     SRINIVASA   : n  Foot deformity : brace on left foot for lymphedema ( she uses the pump )  Edema is more on  left ankle and leg  Than the right side             Lab Results   Component Value Date/Time    Hemoglobin A1c 6.7 08/24/2017 12:00 AM    Hemoglobin A1c 6.4 10/25/2016 12:00 AM    Hemoglobin A1c 8.1 06/27/2016 12:00 AM    Glucose 84 08/24/2017 12:00 AM    Glucose  06/16/2015 04:21 PM    Microalb/Creat ratio (ug/mg creat.) 6.0 08/24/2017 12:00 AM    LDL, calculated 89 08/24/2017 12:00 AM    Creatinine 0.74 08/24/2017 12:00 AM            ASSESSMENT and PLAN    1. Type 2 DM, uncontrolled : A1c is   6.7 %    From  Aug 2017  Compared to  6.4 %     From    April 2017   compared to   6.4 %    From oct 2016  compared to    8.1 %     From   June 2016  compared to   7.9 %     From sept 2015 compared to   8 %   From June 2015 compared to   9 %    From march 2015 compared to   8.1 %   From March 2015 compared to   7.6%     From  Dec 2014 compared to   8.3 %  From aug 2014 compared to  9.4 %   From May 2014 compared to over 11 %    Improved  glycemic control,    Stay  on  basal bolus since sept 2015     Body mass index is 49.92 kg/(m^2).   On soliqua to help her with weight loss      changing from farxiga 10 mg a day with b-fast only to jardiance 25 mg a day  On  janumet xr bid send to pharmacy    Patient is advised about checking blood sugars 1-2  times a day and maintaining log book. The danger of having low blood sugars has been explained with inappropriate use of insulin  Patient voiced understanding and using the printed instructions at home. Hypoglycemia management has been explained to the patient. lab results and schedule of future lab studies reviewed with patient      2. Hypoglycemia :  Educated on treating the hypoglycemia. Discussed proper insulin use and prescribed    3. HTN : continue lisinopril. Patient is educated about importance of compliance with anti-hypertensives especially ARB/ACEI    4. Dyslipidemia : not on statins. Lipids in control   Patient is educated about benefits and adverse effects of statins and explained how benefits outweigh risk. 5. use of aspirin to prevent MI and TIA's discussed      6. Diabetic complications :     A. Retinopathy-NO   educated on this complication,  regular f/u with ophthalmologist encouraged    B. Nephropathy - NO       C. Peripheral Neuropathy -   , mild   educated on this disease and indicated improvement with good and stable glycemic control    D. PAD : NO         E : CAD : NO      7. Hypothyroidism - no more a diagnosis    she is stopped from  taking  synthroid as she is euthyroid without it       8. .obesity  : Body mass index is 49.92 kg/(m^2).          Did the comprehensive foot exam today   I am treating the patient [facility] comprehensive plan of care for diabetes   The patient would benefit from diabetic foot wear to protect their feet         Labs bnv in 3 months

## 2017-12-11 NOTE — PATIENT INSTRUCTIONS
jardiance 25 mg a day - stop farxiga      janumet xr 50/ 1gm twice a day with meals       Check blood sugars immediately before each meal and at bedtime ( print and mail )      Soliqua   50   At bed time     humalog  insulin 8 units before breakfast, 8  units before lunch and 10  units before dinner.     Also, add additional humalog  as follows with meals  If blood sugars are[de-identified]    150-200 mg 1 units    201-250 mg 3 units    251-300 mg 5 units    301-350 mg 7 units    351-400 mg 9 units    401-450 mg 11 units    451-500 mg  13 units     Less than 70 mg NO INSULIN          Do not skip meals  Do not eat in between meals    Reduce carbs- pasta, rice, potatoes, bread   Do not drink juices or sodas  Donot eat peanut butter   Do not eat muffins, biscuits   Do not eat sugar free cookies and cakes   Do not eat peaches, grapes, oranges and pine apples

## 2018-07-12 ENCOUNTER — TELEPHONE (OUTPATIENT)
Dept: ENDOCRINOLOGY | Age: 63
End: 2018-07-12

## 2018-07-12 DIAGNOSIS — E11.65 TYPE 2 DIABETES MELLITUS WITH HYPERGLYCEMIA, WITH LONG-TERM CURRENT USE OF INSULIN (HCC): Primary | ICD-10-CM

## 2018-07-12 DIAGNOSIS — Z79.4 TYPE 2 DIABETES MELLITUS WITH HYPERGLYCEMIA, WITH LONG-TERM CURRENT USE OF INSULIN (HCC): Primary | ICD-10-CM

## 2018-07-12 NOTE — TELEPHONE ENCOUNTER
Lab orders mailed to address on file    Order placed for pt,  per Verbal Order with read back from Dr Leticia Erazo on 7/12/2018.

## 2018-08-23 ENCOUNTER — TELEPHONE (OUTPATIENT)
Dept: ENDOCRINOLOGY | Age: 63
End: 2018-08-23

## 2018-08-23 NOTE — TELEPHONE ENCOUNTER
----- Message from Diego King sent at 8/23/2018 12:22 PM EDT -----  Regarding: /Refill  Pt went to see her PCP on 08.23.18 and was Dx with a yeast infection. Pt advised her PCP has stopped her \"Jardiance\" and the pt would like to know what she should do because she need that medication.        Best contact for the pt [phone]

## 2018-08-23 NOTE — TELEPHONE ENCOUNTER
Educated pt on drinking plenty of water when taking jardiance. Pt states she just got diflucan from other provider and will let us know if she has any problems.  Recommended that she does not stop jardiance and let office know how she is doing

## 2018-09-03 DIAGNOSIS — I10 ESSENTIAL HYPERTENSION WITH GOAL BLOOD PRESSURE LESS THAN 130/80: ICD-10-CM

## 2018-09-03 DIAGNOSIS — E78.2 MIXED HYPERLIPIDEMIA: ICD-10-CM

## 2018-09-03 DIAGNOSIS — E11.65 TYPE 2 DIABETES MELLITUS WITH HYPERGLYCEMIA (HCC): ICD-10-CM

## 2018-09-03 RX ORDER — INSULIN GLARGINE AND LIXISENATIDE 100; 33 U/ML; UG/ML
INJECTION, SOLUTION SUBCUTANEOUS
Qty: 30 ML | Refills: 6 | Status: SHIPPED | OUTPATIENT
Start: 2018-09-03 | End: 2018-10-17 | Stop reason: SDUPTHER

## 2018-09-14 NOTE — TELEPHONE ENCOUNTER
Patient says someone called her but did not leave a message. Patient says to call her cell phone [phone].

## 2018-09-14 NOTE — TELEPHONE ENCOUNTER
Left message for a return phone call.  Trying to see if pt ever received savings card for soliqua medication

## 2018-09-14 NOTE — TELEPHONE ENCOUNTER
----- Message from Ayo Flores sent at 9/14/2018 12:58 PM EDT -----  Regarding: Dr. Jones Roles  Pt calling for prior auth on \"soliqua\"  Insulin 100/33 using [facility]. She would like to come to office today to  one pen  if possible. Best contact is [phone] or [phone]. Completely out.

## 2018-09-19 ENCOUNTER — TELEPHONE (OUTPATIENT)
Dept: ENDOCRINOLOGY | Age: 63
End: 2018-09-19

## 2018-09-19 NOTE — TELEPHONE ENCOUNTER
Pt would like savings card for Xultophy mailed to address on file.  She will contact our office if she has any problems

## 2018-09-24 RX ORDER — EMPAGLIFLOZIN 25 MG/1
TABLET, FILM COATED ORAL
Qty: 30 TAB | Refills: 6 | Status: SHIPPED | OUTPATIENT
Start: 2018-09-24 | End: 2018-10-17 | Stop reason: ALTCHOICE

## 2018-10-04 ENCOUNTER — TELEPHONE (OUTPATIENT)
Dept: ENDOCRINOLOGY | Age: 63
End: 2018-10-04

## 2018-10-04 NOTE — TELEPHONE ENCOUNTER
Patient called regarding Xultophy medication sent to pharmacy. She did receive the discount card but says it is still too expensive.  Would like for you to call her back

## 2018-10-06 NOTE — TELEPHONE ENCOUNTER
xultophy - is  [employer]  and it is good speak to the rep Deat  about this     Let pt know we r trying on her behalf    If by Monday evening they do not help us through , will change to victoza  1.2 mg a day    And     tresiba   Separately   zohaib is a combo of both meds     Ignacio Hodges MD

## 2018-10-16 LAB
ALBUMIN SERPL-MCNC: 4.2 G/DL (ref 3.6–4.8)
ALBUMIN/CREAT UR: 7.3 MG/G CREAT (ref 0–30)
ALBUMIN/GLOB SERPL: 1.4 {RATIO} (ref 1.2–2.2)
ALP SERPL-CCNC: 118 IU/L (ref 39–117)
ALT SERPL-CCNC: 14 IU/L (ref 0–32)
AST SERPL-CCNC: 13 IU/L (ref 0–40)
BILIRUB SERPL-MCNC: 0.6 MG/DL (ref 0–1.2)
BUN SERPL-MCNC: 12 MG/DL (ref 8–27)
BUN/CREAT SERPL: 16 (ref 12–28)
CALCIUM SERPL-MCNC: 9.1 MG/DL (ref 8.7–10.3)
CHLORIDE SERPL-SCNC: 104 MMOL/L (ref 96–106)
CHOLEST SERPL-MCNC: 200 MG/DL (ref 100–199)
CO2 SERPL-SCNC: 23 MMOL/L (ref 20–29)
CREAT SERPL-MCNC: 0.77 MG/DL (ref 0.57–1)
CREAT UR-MCNC: 81.1 MG/DL
EST. AVERAGE GLUCOSE BLD GHB EST-MCNC: 177 MG/DL
GLOBULIN SER CALC-MCNC: 2.9 G/DL (ref 1.5–4.5)
GLUCOSE SERPL-MCNC: 188 MG/DL (ref 65–99)
HBA1C MFR BLD: 7.8 % (ref 4.8–5.6)
HDLC SERPL-MCNC: 90 MG/DL
INTERPRETATION, 910389: NORMAL
LDLC SERPL CALC-MCNC: 97 MG/DL (ref 0–99)
Lab: NORMAL
MICROALBUMIN UR-MCNC: 5.9 UG/ML
POTASSIUM SERPL-SCNC: 4 MMOL/L (ref 3.5–5.2)
PROT SERPL-MCNC: 7.1 G/DL (ref 6–8.5)
SODIUM SERPL-SCNC: 142 MMOL/L (ref 134–144)
TRIGL SERPL-MCNC: 63 MG/DL (ref 0–149)
VLDLC SERPL CALC-MCNC: 13 MG/DL (ref 5–40)

## 2018-10-17 ENCOUNTER — OFFICE VISIT (OUTPATIENT)
Dept: ENDOCRINOLOGY | Age: 63
End: 2018-10-17

## 2018-10-17 VITALS
DIASTOLIC BLOOD PRESSURE: 61 MMHG | HEART RATE: 79 BPM | HEIGHT: 64 IN | SYSTOLIC BLOOD PRESSURE: 108 MMHG | BODY MASS INDEX: 47.8 KG/M2 | RESPIRATION RATE: 16 BRPM | WEIGHT: 280 LBS | TEMPERATURE: 98.4 F

## 2018-10-17 DIAGNOSIS — E78.2 MIXED HYPERLIPIDEMIA: ICD-10-CM

## 2018-10-17 DIAGNOSIS — I10 ESSENTIAL HYPERTENSION: ICD-10-CM

## 2018-10-17 DIAGNOSIS — E11.65 UNCONTROLLED TYPE 2 DIABETES MELLITUS WITH HYPERGLYCEMIA (HCC): Primary | ICD-10-CM

## 2018-10-17 DIAGNOSIS — E66.01 CLASS 3 SEVERE OBESITY DUE TO EXCESS CALORIES WITH SERIOUS COMORBIDITY AND BODY MASS INDEX (BMI) OF 45.0 TO 49.9 IN ADULT (HCC): ICD-10-CM

## 2018-10-17 RX ORDER — LEUCOVORIN CALCIUM 5 MG/1
TABLET ORAL
Refills: 5 | COMMUNITY
Start: 2018-07-30

## 2018-10-17 RX ORDER — AZITHROMYCIN 250 MG/1
TABLET, FILM COATED ORAL
Refills: 0 | COMMUNITY
Start: 2018-08-28 | End: 2019-05-06 | Stop reason: ALTCHOICE

## 2018-10-17 RX ORDER — INSULIN LISPRO 100 [IU]/ML
INJECTION, SOLUTION INTRAVENOUS; SUBCUTANEOUS
Qty: 15 ML | Refills: 6 | Status: SHIPPED | OUTPATIENT
Start: 2018-10-17 | End: 2020-02-26 | Stop reason: ALTCHOICE

## 2018-10-17 RX ORDER — CLONAZEPAM 0.5 MG/1
TABLET ORAL
Refills: 2 | COMMUNITY
Start: 2018-10-04 | End: 2020-02-26 | Stop reason: ALTCHOICE

## 2018-10-17 RX ORDER — SERTRALINE HYDROCHLORIDE 50 MG/1
TABLET, FILM COATED ORAL
Refills: 3 | COMMUNITY
Start: 2018-10-04 | End: 2021-04-15 | Stop reason: DRUGHIGH

## 2018-10-17 RX ORDER — LANCETS 28 GAUGE
EACH MISCELLANEOUS
Qty: 200 LANCET | Refills: 6 | Status: SHIPPED | OUTPATIENT
Start: 2018-10-17 | End: 2020-02-26 | Stop reason: ALTCHOICE

## 2018-10-17 RX ORDER — CLINDAMYCIN HYDROCHLORIDE 150 MG/1
CAPSULE ORAL
Refills: 0 | COMMUNITY
Start: 2018-08-29 | End: 2019-05-06 | Stop reason: ALTCHOICE

## 2018-10-17 NOTE — PATIENT INSTRUCTIONS
jardiance 25 mg a day   Before b-fast   ( optional )     resume  janumet xr 50/ 1gm twice a day with meals       Check blood sugars immediately before each meal and at bedtime ( print and mail )      Stop xultophy and stop [facility] on  trulicity 7.63 mg once a week       Start on  TOUJEO   60 units at night       humalog  insulin 8 units before breakfast, 8  units before lunch and 10  units before dinner.     Also, add additional humalog  as follows with meals  If blood sugars are[de-identified]    150-200 mg 1 units    201-250 mg 3 units    251-300 mg 5 units    301-350 mg 7 units    351-400 mg 9 units    401-450 mg 11 units    451-500 mg  13 units     Less than 70 mg NO INSULIN          Do not skip meals  Do not eat in between meals    Reduce carbs- pasta, rice, potatoes, bread   Do not drink juices or sodas  Donot eat peanut butter   Do not eat muffins, biscuits   Do not eat sugar free cookies and cakes   Do not eat peaches, grapes, oranges and pine apples

## 2018-10-17 NOTE — PROGRESS NOTES
Wt Readings from Last 3 Encounters:   10/17/18 280 lb (127 kg)   12/11/17 290 lb 12.8 oz (131.9 kg)   08/29/17 284 lb (128.8 kg)     Temp Readings from Last 3 Encounters:   10/17/18 98.4 °F (36.9 °C) (Oral)   12/11/17 97.3 °F (36.3 °C) (Oral)   08/29/17 97.7 °F (36.5 °C) (Oral)     BP Readings from Last 3 Encounters:   10/17/18 108/61   12/11/17 147/66   08/29/17 121/55     Pulse Readings from Last 3 Encounters:   10/17/18 79   12/11/17 88   08/29/17 70     Lab Results   Component Value Date/Time    Hemoglobin A1c 7.8 (H) 10/15/2018 07:43 AM    Hemoglobin A1c (POC) 6.7 04/28/2017 03:34 PM

## 2018-10-17 NOTE — PROGRESS NOTES
HISTORY OF PRESENT ILLNESS  Ирина Seo is a 61 y.o. female. HPI  Patient here for f/u after last visit of Type 2 diabetes mellitus from December 2017     10 months of GAP   She is unable to get [place]     She has not taken it for 3 weeks   Her sugars are higher    She stopped janumet xr for quite some time, unclear as to why         Old history     She is here for feet exam for diabetic shoes   She has severe DJD   She is diagnosed with RA    Did  get meter /log    Gained 7  lbs   Blood sugars are better         Old history :     Referred : by pcp- dr. Maria Elena Meza    H/o diabetes for 12 years   Has seen Dr. Willard Vickers  Thus far       Current A1C is 11.1 % from March 2014  and symptoms/problems include none     Current diabetic medications include mix 50/50    30 units AM and 35 units PM and tradjenta . Current monitoring regimen: none  Home blood sugar records: trend: fluctuating a bit  Any episodes of hypoglycemia?  yes - multiple    Weight trend: increasing steadily  Prior visit with dietician: no  Current diet: \"unhealthy\" diet in general  Current exercise: no regular exercise    Known diabetic complications: nephropathy and peripheral neuropathy  Cardiovascular risk factors: dyslipidemia, diabetes mellitus, obesity, sedentary life style, hypertension    Eye exam current (within one year): yes  SRINIVASA: no     Past Medical History:   Diagnosis Date    Asthma     DM [facility]di[facility] mellitus) ([facility] [address]     HTN (hypertension)      Past Surgical History:   Procedure Laterality Date    HX CARPAL TUNNEL RELEASE      both hands    HX DILATION AND CURETTAGE  2011    HX TUBAL LIGATION  6/1985     Current Outpatient Medications   Medication Sig    azithromycin (ZITHROMAX) 250 mg tablet     clindamycin (CLEOCIN) 150 mg capsule TK 1 C PO TID FOR 7 DAYS    clonazePAM (KLONOPIN) 0.5 mg tablet TK 1 T PO QD HS    leucovorin calcium (WELLCOVORIN) 5 mg tablet TK 1 T PO D    sertraline (ZOLOFT) 50 mg tablet TK 1 AND 1/2 TS PO QD    JARDIANCE 25 mg tablet TAKE 1 TABLET BY MOUTH ONCE DAILY. STOP FARXIGA    insulin glargine-lixisenatide (SOLIQUA 100/33) 100 unit-33 mcg/mL inpn 50 Units by SubCUTAneous route nightly. Stop xultophy    albuterol (PROAIR HFA) 90 mcg/actuation inhaler Take  by inhalation.  methotrexate (RHEUMATREX) 2.5 mg tablet Take 12.5 mg by mouth every Monday.  ONETOUCH VERIO strip USE FOR TESTING FOUR TIMES DAILY    insulin lispro (HUMALOG) 100 unit/mL kwikpen Increase to 8 units before breakfast, 8 units before lunch, and 10 units before dinner. Plus sliding scale. STOP MIXED INSULIN    ergocalciferol (DRISDOL) 50,000 unit capsule Take 1 Cap by mouth every seven (7) days.  beclomethasone (QVAR) 40 mcg/actuation inhaler Take 1 Puff by inhalation two (2) times a day.  Lancets (ONE TOUCH DELICA) misc Test 4 times daily. Dx code 250.00    meloxicam (MOBIC) 15 mg tablet Take 15 mg by mouth daily.  insulin degludec-liraglutide (XULTOPHY 100/3.6) 100 unit-3.6 mg /mL (3 mL) inpn INJECT 50 UNITS UNDER THE SKIN EVERY NIGHT. Stop soliqua    hydroxychloroquine (PLAQUENIL) 200 mg tablet TK 1 T PO Q 12 H    folic acid (FOLVITE) 1 mg tablet Take  by mouth daily.  acetaminophen-codeine (TYLENOL #3) 300-30 mg per tablet Take 1 Tab by mouth every six (6) hours as needed.  SITagliptin-metFORMIN (JANUMET XR) 50-1,000 mg TM24 Take 1 Tab by mouth two (2) times daily (with meals). To use with savings coupons    VOLTAREN 1 % topical gel     acetaminophen (TYLENOL) 325 mg tablet Take  by mouth every four (4) hours as needed for Pain. No current facility-administered medications for this visit. Review of Systems   Constitutional: Negative. HENT: Negative. Eyes: Negative for pain and redness. Respiratory: Negative. Cardiovascular: Negative for chest pain, palpitations and leg swelling. Gastrointestinal: Negative. Negative for constipation. Genitourinary: Negative.     Musculoskeletal: Negative for myalgias. Skin: Negative. Neurological: Negative. Endo/Heme/Allergies: Negative. Psychiatric/Behavioral: Negative for depression and memory loss. The patient does not have insomnia. Physical Exam   Constitutional: She is oriented to person, place, and time. She appears well-developed and well-nourished. HENT:   Head: Normocephalic. Eyes: Conjunctivae and EOM are normal. Pupils are equal, round, and reactive to light. Neck: Normal range of motion. Neck supple. No JVD present. No tracheal deviation present. No thyromegaly present. Cardiovascular: Normal rate, regular rhythm and normal heart sounds. No murmur heard. Pulmonary/Chest: Breath sounds normal.   Abdominal: Soft. Bowel sounds are normal.   Musculoskeletal: Normal range of motion. Lymphadenopathy:     She has no cervical adenopathy. Neurological: She is alert and oriented to person, place, and time. She has normal reflexes. Skin: Skin is warm. Psychiatric: She has a normal mood and affect.        Diabetic feet exam : dec 2017   Dr. Santizo Ar      H/o partial or complete amputation of foot : n  H/o previous foot ulceration : n  H/o pre - ulcerative callus : y  H/o peripheral neuropathy and callus : yes  H/o poor circulation     SRINIVASA   : n  Foot deformity : brace on left foot for lymphedema ( she uses the pump )  Edema is more on  left ankle and leg  Than the right side         Diabetic foot exam: oct 2018     Left Foot:   Visual Exam: normal    Pulse DP: 2+ (normal)   Filament test: normal sensation    Vibratory sensation: normal      Right Foot:   Visual Exam: normal    Pulse DP: 2+ (normal)   Filament test: normal sensation    Vibratory sensation: normal       Lab Results   Component Value Date/Time    Hemoglobin A1c 7.8 (H) 10/15/2018 07:43 AM    Hemoglobin A1c 6.7 (H) 08/24/2017 12:00 AM    Hemoglobin A1c 6.4 (H) 10/25/2016 12:00 AM    Glucose 188 (H) 10/15/2018 07:43 AM    Glucose  06/16/2015 04:21 PM Microalb/Creat ratio (ug/mg creat.) 7.3 10/15/2018 07:43 AM    LDL, calculated 97 10/15/2018 07:43 AM    Creatinine 0.77 10/15/2018 07:43 AM            ASSESSMENT and PLAN    1. Type 2 DM, uncontrolled : A1c is   7.8 %     From     Oct 2018       Compared to   6.7 %    From  Aug 2017  Compared to  6.4 %     From    April 2017   compared to   6.4 %    From oct 2016  compared to    8.1 %     From   June 2016  compared to   7.9 %     From sept 2015 compared to   8 %   From June 2015 compared to   9 %    From march 2015 compared to   8.1 %   From March 2015 compared to   7.6%     From  Dec 2014 compared to   8.3 %  From aug 2014 compared to  9.4 %   From May 2014 compared to over 11 %    LOST  glycemic control,  She has not gotten the medication -- especially  xigduo xr , out of med since 2 weeks ago     Stay  on  basal bolus since sept 2015     Body mass index is 48.06 kg/m². Change  soliqua to trulicity and Toujeo     changed  from [address]  to jardiance 25 mg a day  On  janumet xr bid send to pharmacy    Patient is advised about checking blood sugars 1-2  times a day and maintaining log book. The danger of having low blood sugars has been explained with inappropriate use of insulin  Patient voiced understanding and using the printed instructions at home. Hypoglycemia management has been explained to the patient. lab results and schedule of future lab studies reviewed with patient      2. Hypoglycemia :  Educated on treating the hypoglycemia. Discussed proper insulin use and prescribed    3. HTN : continue lisinopril. Patient is educated about importance of compliance with anti-hypertensives especially ARB/ACEI    4. Dyslipidemia : not on statins. Lipids in control   Patient is educated about benefits and adverse effects of statins and explained how benefits outweigh risk. 5. use of aspirin to prevent MI and TIA's discussed      6. Diabetic complications :     A.  Retinopathy-NO   educated on this complication,  regular f/u with ophthalmologist encouraged    B. Nephropathy - NO       C. Peripheral Neuropathy -   , mild   educated on this disease and indicated improvement with good and stable glycemic control    D. PAD : NO         E : CAD : NO      7. Hypothyroidism - no more a diagnosis    she is stopped from  taking  synthroid as she is euthyroid without it       8. .obesity  : Body mass index is 48.06 kg/m².          Labs bnv in 3 months

## 2018-10-22 ENCOUNTER — DOCUMENTATION ONLY (OUTPATIENT)
Dept: ENDOCRINOLOGY | Age: 63
End: 2018-10-22

## 2018-10-22 NOTE — PROGRESS NOTES
[facility] denied per Dr Hollie Valdez notes will send in (VICTOZA 1.2mg along with 7 [facility])

## 2018-11-02 ENCOUNTER — TELEPHONE (OUTPATIENT)
Dept: ENDOCRINOLOGY | Age: 63
End: 2018-11-02

## 2019-04-17 LAB
HBA1C MFR BLD HPLC: 8.3 %
LDL-C, EXTERNAL: 107
MICROALBUMIN UR TEST STR-MCNC: 3.9 MG/DL

## 2019-05-06 ENCOUNTER — OFFICE VISIT (OUTPATIENT)
Dept: ENDOCRINOLOGY | Age: 64
End: 2019-05-06

## 2019-05-06 VITALS
DIASTOLIC BLOOD PRESSURE: 46 MMHG | TEMPERATURE: 98.2 F | HEIGHT: 64 IN | WEIGHT: 287.9 LBS | OXYGEN SATURATION: 98 % | HEART RATE: 80 BPM | BODY MASS INDEX: 49.15 KG/M2 | RESPIRATION RATE: 18 BRPM | SYSTOLIC BLOOD PRESSURE: 127 MMHG

## 2019-05-06 DIAGNOSIS — E66.01 CLASS 3 SEVERE OBESITY DUE TO EXCESS CALORIES WITH SERIOUS COMORBIDITY AND BODY MASS INDEX (BMI) OF 45.0 TO 49.9 IN ADULT (HCC): ICD-10-CM

## 2019-05-06 DIAGNOSIS — E78.2 MIXED HYPERLIPIDEMIA: ICD-10-CM

## 2019-05-06 DIAGNOSIS — I10 ESSENTIAL HYPERTENSION: ICD-10-CM

## 2019-05-06 DIAGNOSIS — E11.65 UNCONTROLLED TYPE 2 DIABETES MELLITUS WITH HYPERGLYCEMIA (HCC): Primary | ICD-10-CM

## 2019-05-06 NOTE — PROGRESS NOTES
HISTORY OF PRESENT ILLNESS Deni Cassidy is a 59 y.o. female. HPI Patient here for f/u after last visit of Type 2 diabetes mellitus from October 2018 She tried Toujeo and she stopped it as she felt unwell SHE ALSO STOPPED TRULICITY SHE HAS LOT OF STRESS Old history Last visit was   Dec    2017  
 
10 months of [facility] She is unable to get soliqua She has not taken it for 3 weeks Her sugars are higher She stopped janumet xr for quite some time, unclear as to why Old history She is here for feet exam for diabetic shoes She has severe DJD She is diagnosed with RA Did  get meter /log Gained 7  lbs Blood sugars are better Old history : 
  
Referred : by pcp- dr. Malaika Patton H/o diabetes for 12 years Has seen Dr. Torsten Garcia  Thus far Current A1C is 11.1 % from March 2014  and symptoms/problems include none Current diabetic medications include mix 50/50    30 units AM and 35 units PM and tradjenta . Current monitoring regimen: none Home blood sugar records: trend: fluctuating a bit Any episodes of hypoglycemia? yes - multiple Weight trend: increasing steadily Prior visit with dietician: no 
Current diet: \"unhealthy\" diet in general 
Current exercise: no regular exercise Known diabetic complications: nephropathy and peripheral neuropathy Cardiovascular risk factors: dyslipidemia, diabetes mellitus, obesity, sedentary life style, hypertension Eye exam current (within one year): yes SRINIVASA: no  
 
Past Medical History:  
Diagnosis Date  Asthma  DM (diabetes mellitus) ([facility] [address]  HTN (hypertension) Past Surgical History:  
Procedure Laterality Date  HX CARPAL TUNNEL RELEASE    
 both hands  HX DILATION AND CURETTAGE  2011  HX TUBAL LIGATION  6/1985 Current Outpatient Medications Medication Sig  
 leucovorin calcium (WELLCOVORIN) 5 mg tablet TK 1 T PO D  
 sertraline (ZOLOFT) 50 mg tablet TK 1 AND 1/2 TS PO QD  
  SITagliptin-metFORMIN (JANUMET XR) 50-1,000 mg TM24 Take 1 Tab by mouth two (2) times daily (with meals). To use with savings coupons  insulin glargine U-300 conc (TOUJEO MAX U-300 SOLOSTAR) 300 unit/mL (3 mL) inpn 60 Units by SubCUTAneous route nightly.  insulin lispro (HUMALOG) 100 unit/mL kwikpen Increase to 8 units before breakfast, 8 units before lunch, and 10 units before dinner. Plus sliding scale. STOP MIXED INSULIN  
 glucose blood VI test strips (TRUE METRIX GLUCOSE TEST STRIP) strip Test 4 times daily. Dx code E11.65  
 lancets (TRUEPLUS LANCETS) 28 gauge misc Test 4 times daily. Dx code E11.65  
 albuterol (PROAIR HFA) 90 mcg/actuation inhaler Take  by inhalation.  methotrexate (RHEUMATREX) 2.5 mg tablet Take 12.5 mg by mouth every Monday.  ONETOUCH VERIO strip USE FOR TESTING FOUR TIMES DAILY  ergocalciferol (DRISDOL) 50,000 unit capsule Take 1 Cap by mouth every seven (7) days.  beclomethasone (QVAR) 40 mcg/actuation inhaler Take 1 Puff by inhalation two (2) times a day.  VOLTAREN 1 % topical gel  meloxicam (MOBIC) 15 mg tablet Take 15 mg by mouth daily.  acetaminophen (TYLENOL) 325 mg tablet Take  by mouth every four (4) hours as needed for Pain.  Liraglutide (VICTOZA) 0.6 mg/0.1 mL (18 mg/3 mL) pnij Take 1.2mg a day  
 azithromycin (ZITHROMAX) 250 mg tablet  clindamycin (CLEOCIN) 150 mg capsule TK 1 C PO TID FOR 7 DAYS  clonazePAM (KLONOPIN) 0.5 mg tablet TK 1 T PO QD HS  
 hydroxychloroquine (PLAQUENIL) 200 mg tablet TK 1 T PO Q 12 H  
 folic acid (FOLVITE) 1 mg tablet Take  by mouth daily.  acetaminophen-codeine (TYLENOL #3) 300-30 mg per tablet Take 1 Tab by mouth every six (6) hours as needed. No current facility-administered medications for this visit. Review of Systems Constitutional: Negative. HENT: Negative. Eyes: Negative for pain and redness. Respiratory: Negative. Cardiovascular: Negative for chest pain, palpitations and leg swelling. Gastrointestinal: Negative. Negative for constipation. Genitourinary: Negative. Musculoskeletal: Negative for myalgias. Skin: Negative. Neurological: Negative. Endo/Heme/Allergies: Negative. Psychiatric/Behavioral: Negative for depression and memory loss. The patient does not have insomnia. Physical Exam  
Constitutional: She is oriented to person, place, and time. She appears well-developed and well-nourished. HENT:  
Head: Normocephalic. Eyes: Conjunctivae and EOM are normal. Pupils are equal, round, and reactive to light. Neck: Normal range of motion. Neck supple. No JVD present. No tracheal deviation present. No thyromegaly present. Cardiovascular: Normal rate, regular rhythm and normal heart sounds. No murmur heard. Pulmonary/Chest: Breath sounds normal.  
Abdominal: Soft. Bowel sounds are normal.  
Musculoskeletal: Normal range of motion. Lymphadenopathy:  
  She has no cervical adenopathy. Neurological: She is alert and oriented to person, place, and time. She has normal reflexes. Skin: Skin is warm. Psychiatric: She has a normal mood and affect. Diabetic feet exam : dec 2017 Dr. Heidy Mcdonald H/o partial or complete amputation of foot : n 
H/o previous foot ulceration : n 
H/o pre - ulcerative callus : y 
H/o peripheral neuropathy and callus : yes H/o poor circulation     SRINIVASA   : n 
Foot deformity : brace on left foot for lymphedema ( she uses the pump ) Edema is more on  left ankle and leg  Than the right side Diabetic foot exam: oct 2018 Left Foot: 
 Visual Exam: normal  
 Pulse DP: 2+ (normal) Filament test: normal sensation Vibratory sensation: normal 
   
Right Foot: 
 Visual Exam: normal  
 Pulse DP: 2+ (normal) Filament test: normal sensation Vibratory sensation: normal  
 
 
Lab Results Component Value Date/Time Hemoglobin A1c 7.8 (H) 10/15/2018 07:43 AM  
 Hemoglobin A1c 6.7 (H) 08/24/2017 12:00 AM  
 Hemoglobin A1c 6.4 (H) 10/25/2016 12:00 AM  
 Glucose 188 (H) 10/15/2018 07:43 AM  
 Glucose  06/16/2015 04:21 PM  
 Microalb/Creat ratio (ug/mg creat.) 7.3 10/15/2018 07:43 AM  
 LDL, calculated 97 10/15/2018 07:43 AM  
 Creatinine 0.77 10/15/2018 07:43 AM  
  
 
 
 
ASSESSMENT and PLAN 1. Type 2 DM, uncontrolled : A1c is    8.1 %     From April 2019     comapred to  7.8 %     From     Oct 2018       Compared to   6.7 %    From  Aug 2017  Compared to  6.4 %     From    April 2017   compared to   6.4 %    From oct 2016  compared to    8.1 %     From   June 2016  compared to   7.9 %     From sept 2015 compared to   8 %   From June 2015 compared to   9 %    From march 2015 LOST  glycemic control, SHE IS DISRUPTIVE ON   basal bolus BY STOPPING TOUJEO Body mass index is 49.42 kg/m². Stay on  trulicity RESUME  Toujeo MAY 2019  
 
on jardiance 25 mg a day ( HER CHOICE ) On  janumet xr bid send to pharmacy Patient is advised about checking blood sugars 1-2  times a day and maintaining log book. The danger of having low blood sugars has been explained with inappropriate use of insulin  Patient voiced understanding and using the printed instructions at home. Hypoglycemia management has been explained to the patient. lab results and schedule of future lab studies reviewed with patient 2. Hypoglycemia :  Educated on treating the hypoglycemia. Discussed proper insulin use and prescribed 3. HTN : continue lisinopril. Patient is educated about importance of compliance with anti-hypertensives especially ARB/ACEI 4. Dyslipidemia : not on statins. Lipids in control Patient is educated about benefits and adverse effects of statins and explained how benefits outweigh risk. 5. use of aspirin to prevent MI and TIA's discussed 6.   Diabetic complications :  
 
 A. Retinopathy-NO   educated on this complication,  regular f/u with ophthalmologist encouraged B. Nephropathy - NO    
 
C. Peripheral Neuropathy -   , mild  
educated on this disease and indicated improvement with good and stable glycemic control D. PAD : NO      
 
E : CAD : NO 
 
 
7. Hypothyroidism - no more a diagnosis  
 she is stopped from  taking  synthroid as she is euthyroid without it 8. .obesity  : Body mass index is 49.42 kg/m². Labs bnv in 3 months

## 2019-05-06 NOTE — PATIENT INSTRUCTIONS
jardiance 25 mg a day   Before b-fast   ( optional ) STAY ON   janumet xr 50/ 1gm twice a day with meals Check blood sugars immediately before each meal and at bedtime ( print and mail ) RESUME   trulicity 5.60 mg once a week  ( 25 $)- CAN GIVE NAUSEA Resume   TOUJEO   50 units at night ( 10 $) 
 
humalog UNIT - 200   insulin 8 units before breakfast, 8  units before lunch and 10  units before dinner. ( 25 $ ) Also, add additional humalog  as follows with meals  If blood sugars are[de-identified] 
 
150-200 mg 1 units 201-250 mg 3 units 251-300 mg 5 units 301-350 mg 7 units 351-400 mg 9 units 401-450 mg 11 units 451-500 mg  13 units Less than 70 mg NO INSULIN 
 
 
 
 
Do not skip meals Do not eat in between meals Reduce carbs- pasta, rice, potatoes, bread Do not drink juices or sodas Donot eat peanut butter Do not eat muffins, biscuits Do not eat sugar free cookies and cakes Do not eat peaches, grapes, oranges and pine apples

## 2019-05-06 NOTE — LETTER
5/6/19 Patient: Suha Rick YOB: 1955 Date of Visit: 5/6/2019 Anamaria Man MD 
1 [facility] [address] [place] [id number] VIA Facsimile: [phone] Dear Anamaria Man MD, Thank you for referring Ms. Suha Rick to [address] [place] [address] for evaluation. My notes for this consultation are attached. If you have questions, please do not hesitate to call me. I look forward to following your patient along with you. Sincerely, Saw Granados MD

## 2019-05-06 NOTE — PROGRESS NOTES
1. Have you been to the ER, urgent care clinic since your last visit? Hospitalized since your last visit? No 
 
2. Have you seen or consulted any other health care providers outside of the [address] [place] [address] since your last visit? Include any pap smears or colon screening. No  
Chief Complaint Patient presents with  Diabetes  
  followup Learning assessment complete Abuse Screening Questionnaire 5/6/2019 Do you ever feel afraid of your partner? Sharon Blancas Are you in a relationship with someone who physically or mentally threatens you? Sharon Blancas Is it safe for you to go home? Vlad Paz Lab Results Component Value Date/Time Hemoglobin A1c 7.8 (H) 10/15/2018 07:43 AM  
 Hemoglobin A1c (POC) 6.7 04/28/2017 03:34 PM  
 
Wt Readings from Last 3 Encounters:  
05/06/19 287 lb 14.4 oz (130.6 kg) 10/17/18 280 lb (127 kg) 12/11/17 290 lb 12.8 oz (131.9 kg) Temp Readings from Last 3 Encounters:  
05/06/19 98.2 °F (36.8 °C) (Oral) 10/17/18 98.4 °F (36.9 °C) (Oral) 12/11/17 97.3 °F (36.3 °C) (Oral) BP Readings from Last 3 Encounters:  
05/06/19 127/46  
10/17/18 108/61  
12/11/17 147/66 Pulse Readings from Last 3 Encounters:  
05/06/19 80  
10/17/18 79  
12/11/17 88

## 2019-05-07 NOTE — TELEPHONE ENCOUNTER
----- Message from Remi Lesches sent at 5/7/2019 12:10 PM EDT -----  Regarding: DR Mary Alice Gonzales / Viktoriya Wilhelm Pt is requesting that a Prior Authorization for \"Trulicity\" to be sent to the [facility] on file.      Best contact: [phone]

## 2019-08-10 LAB
ALBUMIN SERPL-MCNC: 4 G/DL (ref 3.6–4.8)
ALBUMIN/CREAT UR: 2.2 MG/G CREAT (ref 0–30)
ALBUMIN/GLOB SERPL: 1.3 {RATIO} (ref 1.2–2.2)
ALP SERPL-CCNC: 91 IU/L (ref 39–117)
ALT SERPL-CCNC: 14 IU/L (ref 0–32)
AST SERPL-CCNC: 14 IU/L (ref 0–40)
BILIRUB SERPL-MCNC: 0.6 MG/DL (ref 0–1.2)
BUN SERPL-MCNC: 14 MG/DL (ref 8–27)
BUN/CREAT SERPL: 18 (ref 12–28)
CALCIUM SERPL-MCNC: 9.3 MG/DL (ref 8.7–10.3)
CHLORIDE SERPL-SCNC: 105 MMOL/L (ref 96–106)
CHOLEST SERPL-MCNC: 186 MG/DL (ref 100–199)
CO2 SERPL-SCNC: 25 MMOL/L (ref 20–29)
CREAT SERPL-MCNC: 0.8 MG/DL (ref 0.57–1)
CREAT UR-MCNC: 152.6 MG/DL
EST. AVERAGE GLUCOSE BLD GHB EST-MCNC: 154 MG/DL
GLOBULIN SER CALC-MCNC: 3.2 G/DL (ref 1.5–4.5)
GLUCOSE SERPL-MCNC: 84 MG/DL (ref 65–99)
HBA1C MFR BLD: 7 % (ref 4.8–5.6)
HDLC SERPL-MCNC: 81 MG/DL
INTERPRETATION, 910389: NORMAL
LDLC SERPL CALC-MCNC: 94 MG/DL (ref 0–99)
Lab: NORMAL
MICROALBUMIN UR-MCNC: 3.3 UG/ML
POTASSIUM SERPL-SCNC: 4.5 MMOL/L (ref 3.5–5.2)
PROT SERPL-MCNC: 7.2 G/DL (ref 6–8.5)
SODIUM SERPL-SCNC: 142 MMOL/L (ref 134–144)
TRIGL SERPL-MCNC: 57 MG/DL (ref 0–149)
VLDLC SERPL CALC-MCNC: 11 MG/DL (ref 5–40)

## 2019-08-13 ENCOUNTER — OFFICE VISIT (OUTPATIENT)
Dept: ENDOCRINOLOGY | Age: 64
End: 2019-08-13

## 2019-08-13 VITALS
HEIGHT: 64 IN | HEART RATE: 85 BPM | SYSTOLIC BLOOD PRESSURE: 145 MMHG | RESPIRATION RATE: 18 BRPM | DIASTOLIC BLOOD PRESSURE: 65 MMHG | WEIGHT: 293 LBS | TEMPERATURE: 97.3 F | OXYGEN SATURATION: 98 % | BODY MASS INDEX: 50.02 KG/M2

## 2019-08-13 DIAGNOSIS — E66.01 CLASS 3 SEVERE OBESITY DUE TO EXCESS CALORIES WITH SERIOUS COMORBIDITY AND BODY MASS INDEX (BMI) OF 45.0 TO 49.9 IN ADULT (HCC): ICD-10-CM

## 2019-08-13 DIAGNOSIS — E78.2 MIXED HYPERLIPIDEMIA: ICD-10-CM

## 2019-08-13 DIAGNOSIS — E11.65 UNCONTROLLED TYPE 2 DIABETES MELLITUS WITH HYPERGLYCEMIA (HCC): Primary | ICD-10-CM

## 2019-08-13 DIAGNOSIS — I10 ESSENTIAL HYPERTENSION: ICD-10-CM

## 2019-08-13 NOTE — PROGRESS NOTES
1. Have you been to the ER, urgent care clinic since your last visit? Hospitalized since your last visit? No    2. Have you seen or consulted any other health care providers outside of the [facility] since your last visit? Include any pap smears or colon screening. No       Chief Complaint   Patient presents with    Diabetes     follow up     Learning assessment complete  Abuse Screening Questionnaire 8/13/2019   Do you ever feel afraid of your partner? N   Are you in a relationship with someone who physically or mentally threatens you? N   Is it safe for you to go home? Y     Medication reviewed with patient to the best of his ability and nurse document \"not taking\" and/or \"taking\" based on patients response.   Lab Results   Component Value Date/Time    Hemoglobin A1c 7.0 (H) 08/09/2019 09:52 AM    Hemoglobin A1c (POC) 6.7 04/28/2017 03:34 PM    Hemoglobin A1c, External 8.3 04/17/2019     Wt Readings from Last 3 Encounters:   08/13/19 294 lb 14.4 oz (133.8 kg)   05/06/19 287 lb 14.4 oz (130.6 kg)   10/17/18 280 lb (127 kg)     Temp Readings from Last 3 Encounters:   08/13/19 97.3 °F (36.3 °C) (Oral)   05/06/19 98.2 °F (36.8 °C) (Oral)   10/17/18 98.4 °F (36.9 °C) (Oral)     BP Readings from Last 3 Encounters:   08/13/19 145/65   05/06/19 127/46   10/17/18 108/61     Pulse Readings from Last 3 Encounters:   08/13/19 85   05/06/19 80   10/17/18 79

## 2019-08-13 NOTE — PATIENT INSTRUCTIONS
jardiance 25 mg a day   Before b-fast   ( optional ) STAY ON   janumet xr 50/ 1gm twice a day with meals Check blood sugars immediately before each meal and at bedtime ( print and mail ) 
 
   trulicity 5.66 mg once a week TOUJEO   50 units at night  
 
humalog UNIT - 200   insulin 8 units before breakfast, 8  units before lunch and 10  units before dinner. Also, add additional humalog  as follows with meals  If blood sugars are[de-identified] 
 
150-200 mg 1 units 201-250 mg 3 units 251-300 mg 5 units 301-350 mg 7 units 351-400 mg 9 units 401-450 mg 11 units 451-500 mg  13 units Less than 70 mg NO INSULIN 
 
 
 
 
Do not skip meals Do not eat in between meals Reduce carbs- pasta, rice, potatoes, bread Do not drink juices or sodas Donot eat peanut butter Do not eat muffins, biscuits Do not eat sugar free cookies and cakes Do not eat peaches, grapes, oranges and pine apples

## 2019-08-13 NOTE — LETTER
8/14/19 Patient: Adiel Christianson YOB: 1955 Date of Visit: 8/13/2019 Mahnaz Salinas MD 
1 [facility] [place] [id number] VIA Facsimile: [phone] Dear Mahnaz Salinas MD, Thank you for referring Ms. Adiel Christianson to [address] [place] [address] for evaluation. My notes for this consultation are attached. If you have questions, please do not hesitate to call me. I look forward to following your patient along with you. Sincerely, Krysta Aly MD

## 2019-08-13 NOTE — PROGRESS NOTES
HISTORY OF PRESENT ILLNESS  Brionna Rocha is a 59 y.o. female. HPI  Patient here for f/u after last visit of Type 2 diabetes mellitus from May 6 2019     Gained few lbs ( wearing raza boot )   She had tendinitis on feet  ( Rheumatoid arthritis )  She is being helped           Old history :     She tried Toujeo and she stopped it as she felt unwell   SHE ALSO STOPPED TRULICITY   SHE HAS LOT OF STRESS           Old history     Last visit was   Dec    2017     10 months of [facility]   She is unable to get [place]   She has not taken it for 3 weeks   Her sugars are higher  She stopped janumet xr for quite some time, unclear as to why         Old history     She is here for feet exam for diabetic shoes   She has severe DJD   She is diagnosed with RA    Did  get meter /log    Gained 7  lbs   Blood sugars are better         Old history :     Referred : by pcp- dr. Jean Wright    H/o diabetes for 12 years   Has seen Dr. Keri Birch  Thus far       Current A1C is 11.1 % from March 2014  and symptoms/problems include none     Current diabetic medications include mix 50/50    30 units AM and 35 units PM and tradjenta . Current monitoring regimen: none  Home blood sugar records: trend: fluctuating a bit  Any episodes of hypoglycemia?  yes - multiple    Weight trend: increasing steadily  Prior visit with dietician: no  Current diet: \"unhealthy\" diet in general  Current exercise: no regular exercise    Known diabetic complications: nephropathy and peripheral neuropathy  Cardiovascular risk factors: dyslipidemia, diabetes mellitus, obesity, sedentary life style, hypertension    Eye exam current (within one year): yes  SRINIVASA: no     Past Medical History:   Diagnosis Date    Asthma     DM (diabetes mellitus) ([facility] [address]     HTN (hypertension)      Past Surgical History:   Procedure Laterality Date    HX CARPAL TUNNEL RELEASE      both hands    HX DILATION AND CURETTAGE  2011    HX TUBAL LIGATION  6/1985     Current Outpatient Medications   Medication Sig    JANUMET XR 50-1,000 mg TM24 TAKE 1 TABLET BY MOUTH TWICE DAILY( WITH MEALS)    dulaglutide (TRULICITY) 8.54 OF/7.8 mL sub-q pen 0.5 mL by SubCUTAneous route every seven (7) days.  SITagliptin-metFORMIN (JANUMET XR) 50-1,000 mg TM24 Take 1 Tab by mouth two (2) times daily (with meals). To use with savings coupons    insulin glargine U-300 conc (TOUJEO MAX U-300 SOLOSTAR) 300 unit/mL (3 mL) inpn 50 Units by SubCUTAneous route nightly.  insulin lispro (HUMALOG KWIKPEN INSULIN) 200 unit/mL (3 mL) inpn Inject 8 units before breakfast and lunch, 10 units before dinner Plus sliding scale to max 65 units daily.  leucovorin calcium (WELLCOVORIN) 5 mg tablet TK 1 T PO D    sertraline (ZOLOFT) 50 mg tablet TK 1 AND 1/2 TS PO QD    insulin lispro (HUMALOG) 100 unit/mL kwikpen Increase to 8 units before breakfast, 8 units before lunch, and 10 units before dinner. Plus sliding scale. STOP MIXED INSULIN    glucose blood VI test strips (TRUE METRIX GLUCOSE TEST STRIP) strip Test 4 times daily. Dx code E11.65    lancets (TRUEPLUS LANCETS) 28 gauge misc Test 4 times daily. Dx code E11.65    albuterol (PROAIR HFA) 90 mcg/actuation inhaler Take  by inhalation.  methotrexate (RHEUMATREX) 2.5 mg tablet Take 12.5 mg by mouth every Monday.  ONETOUCH VERIO strip USE FOR TESTING FOUR TIMES DAILY    ergocalciferol (DRISDOL) 50,000 unit capsule Take 1 Cap by mouth every seven (7) days.  beclomethasone (QVAR) 40 mcg/actuation inhaler Take 1 Puff by inhalation two (2) times a day.  VOLTAREN 1 % topical gel     acetaminophen (TYLENOL) 325 mg tablet Take  by mouth every four (4) hours as needed for Pain.  empagliflozin (JARDIANCE) 25 mg tablet Take 1 Tab by mouth daily.  clonazePAM (KLONOPIN) 0.5 mg tablet TK 1 T PO QD HS    folic acid (FOLVITE) 1 mg tablet Take  by mouth daily.  meloxicam (MOBIC) 15 mg tablet Take 15 mg by mouth daily. No current facility-administered medications for this visit. Review of Systems   Constitutional: Negative. HENT: Negative. Eyes: Negative for pain and redness. Respiratory: Negative. Cardiovascular: Negative for chest pain, palpitations and leg swelling. Gastrointestinal: Negative. Negative for constipation. Genitourinary: Negative. Musculoskeletal: Negative for myalgias. Skin: Negative. Neurological: Negative. Endo/Heme/Allergies: Negative. Psychiatric/Behavioral: Negative for depression and memory loss. The patient does not have insomnia. Physical Exam   Constitutional: She is oriented to person, place, and time. She appears well-developed and well-nourished. HENT:   Head: Normocephalic. Eyes: Conjunctivae and EOM are normal. Pupils are equal, round, and reactive to light. Neck: Normal range of motion. Neck supple. No JVD present. No tracheal deviation present. No thyromegaly present. Cardiovascular: Normal rate, regular rhythm and normal heart sounds. No murmur heard. Pulmonary/Chest: Breath sounds normal.   Abdominal: Soft. Bowel sounds are normal.   Musculoskeletal: Normal range of motion. Lymphadenopathy:     She has no cervical adenopathy. Neurological: She is alert and oriented to person, place, and time. She has normal reflexes. Skin: Skin is warm. Psychiatric: She has a normal mood and affect.        Diabetic feet exam : dec 2017   Dr. John Abarca      H/o partial or complete amputation of foot : n  H/o previous foot ulceration : n  H/o pre - ulcerative callus : y  H/o peripheral neuropathy and callus : yes  H/o poor circulation     SRINIVASA   : n  Foot deformity : brace on left foot for lymphedema ( she uses the pump )  In raza boot          Diabetic foot exam: oct 2018     Left Foot:   Visual Exam: normal    Pulse DP: 2+ (normal)   Filament test: normal sensation    Vibratory sensation: normal      Right Foot:   Visual Exam: normal    Pulse DP: 2+ (normal)   Filament test: normal sensation    Vibratory sensation: normal       Lab Results   Component Value Date/Time    Hemoglobin A1c 7.0 (H) 08/09/2019 09:52 AM    Hemoglobin A1c 7.8 (H) 10/15/2018 07:43 AM    Hemoglobin A1c 6.7 (H) 08/24/2017 12:00 AM    Glucose 84 08/09/2019 09:52 AM    Glucose  06/16/2015 04:21 PM    Microalb/Creat ratio (ug/mg creat.) 2.2 08/09/2019 09:52 AM    LDL, calculated 94 08/09/2019 09:52 AM    Creatinine 0.80 08/09/2019 09:52 AM    Hemoglobin A1c, External 8.3 04/17/2019            ASSESSMENT and PLAN    1. Type 2 DM, uncontrolled : A1c is  7 %     From  August 2019     compared to    8.1 %     From April 2019     comapred to  7.8 %     From     Oct 2018       Compared to   6.7 %    From  Aug 2017  Compared to  6.4 %     From    April 2017   compared to   6.4 %    From oct 2016  compared to    8.1 %     From   June 2016  compared to   7.9 %     From sept 2015 compared to   8 %   From June 2015 compared to   9 %    From march 2015       Improved   glycemic control,  Log is good     She has improved compliance with toujeo ( resumed it as she was not taking it ) and trulicity and humalog and janumet xr   Stopped jardiance for yeast infections     Patient is advised about checking blood sugars 1-2  times a day and maintaining log book. The danger of having low blood sugars has been explained with inappropriate use of insulin  Patient voiced understanding and using the printed instructions at home. Hypoglycemia management has been explained to the patient. lab results and schedule of future lab studies reviewed with patient      2. Hypoglycemia :  Educated on treating the hypoglycemia. Discussed proper insulin use and prescribed    3. HTN : continue lisinopril. Patient is educated about importance of compliance with anti-hypertensives especially ARB/ACEI    4. Dyslipidemia : not on statins. Lipids in control   Patient is educated about benefits and adverse effects of statins and explained how benefits outweigh risk.     5. use of aspirin to prevent MI and TIA's discussed      6. Diabetic complications :     A. Retinopathy-NO   educated on this complication,  regular f/u with ophthalmologist encouraged    B. Nephropathy - NO       C. Peripheral Neuropathy -   , mild   educated on this disease and indicated improvement with good and stable glycemic control    D. PAD : NO         E : CAD : NO      7. Hypothyroidism - no more a diagnosis    she is stopped from  taking  synthroid as she is euthyroid without it       8. Obesity  : Body mass index is 50.62 kg/m².          Labs bnv in 3 months

## 2019-12-13 LAB
HBA1C MFR BLD HPLC: 7.1 %
LDL-C, EXTERNAL: 95

## 2019-12-14 LAB
CREATININE, EXTERNAL: 0.71
MICROALBUMIN UR TEST STR-MCNC: 6.5 MG/DL

## 2020-02-26 ENCOUNTER — OFFICE VISIT (OUTPATIENT)
Dept: ENDOCRINOLOGY | Age: 65
End: 2020-02-26

## 2020-02-26 VITALS
HEIGHT: 64 IN | BODY MASS INDEX: 50.02 KG/M2 | SYSTOLIC BLOOD PRESSURE: 134 MMHG | TEMPERATURE: 98 F | HEART RATE: 75 BPM | RESPIRATION RATE: 18 BRPM | WEIGHT: 293 LBS | DIASTOLIC BLOOD PRESSURE: 59 MMHG | OXYGEN SATURATION: 96 %

## 2020-02-26 DIAGNOSIS — E11.65 UNCONTROLLED TYPE 2 DIABETES MELLITUS WITH HYPERGLYCEMIA (HCC): Primary | ICD-10-CM

## 2020-02-26 DIAGNOSIS — I10 ESSENTIAL HYPERTENSION: ICD-10-CM

## 2020-02-26 DIAGNOSIS — E66.01 CLASS 3 SEVERE OBESITY DUE TO EXCESS CALORIES WITH SERIOUS COMORBIDITY AND BODY MASS INDEX (BMI) OF 45.0 TO 49.9 IN ADULT (HCC): ICD-10-CM

## 2020-02-26 DIAGNOSIS — E78.2 MIXED HYPERLIPIDEMIA: ICD-10-CM

## 2020-02-26 RX ORDER — ROSUVASTATIN CALCIUM 5 MG/1
TABLET, COATED ORAL
COMMUNITY
Start: 2020-01-31 | End: 2022-05-12 | Stop reason: ALTCHOICE

## 2020-02-26 RX ORDER — CLOBETASOL PROPIONATE 0.5 MG/G
EMULSION TOPICAL
COMMUNITY
Start: 2019-12-04 | End: 2022-05-12 | Stop reason: ALTCHOICE

## 2020-02-26 RX ORDER — CLOPIDOGREL BISULFATE 75 MG/1
TABLET ORAL
COMMUNITY
Start: 2020-01-23 | End: 2021-10-15

## 2020-02-26 NOTE — LETTER
2/26/20 Patient: Alonso Cruz YOB: 1955 Date of Visit: 2/26/2020 Jc Briones MD 
1 [facility] [address] [facility] [address] [address] VIA Facsimile: [phone] Dear Jc Briones MD, Thank you for referring Ms. Alonso Cruz to [address] [place] [address] for evaluation. My notes for this consultation are attached. If you have questions, please do not hesitate to call me. I look forward to following your patient along with you. Sincerely, Kenya Junior MD

## 2020-02-26 NOTE — PATIENT INSTRUCTIONS
[employer]estyle [employer] Start    On  jardiance 25 mg a day   Before b-fast   
 
STAY ON   janumet xr 50/ 1gm twice a day with meals Check blood sugars immediately before each meal and at bedtime ( print and mail ) 
 
 stay on  trulicity 8.29 mg once a week   
 
 stay on  TOUJEO   50 units at night  
 
humalog UNIT - 200   insulin 8 units before breakfast, 8  units before lunch and 10  units before dinner. Also, add additional humalog  as follows with meals  If blood sugars are[de-identified] 
 
150-200 mg 1 units 201-250 mg 3 units 251-300 mg 5 units 301-350 mg 7 units 351-400 mg 9 units 401-450 mg 11 units 451-500 mg  13 units Less than 70 mg NO INSULIN 
 
 
 
 
Do not skip meals Do not eat in between meals Reduce carbs- pasta, rice, potatoes, bread Do not drink juices or sodas Donot eat peanut butter Do not eat muffins, biscuits Do not eat sugar free cookies and cakes Do not eat peaches, grapes, oranges and pine apples

## 2020-02-26 NOTE — PROGRESS NOTES
Room 2    Identified pt with two pt identifiers(name and ). Reviewed record in preparation for visit and have obtained necessary documentation. All patient medications has been reviewed. Chief Complaint   Patient presents with    Diabetes       Health Maintenance Due   Topic    Hepatitis C Screening     DTaP/Tdap/Td series (1 - Tdap)    PAP AKA CERVICAL CYTOLOGY     Shingrix Vaccine Age 50> (1 of 2)    Breast Cancer Screen Mammogram     FOBT Q1Y Age 54-65     Eye Exam Retinal or Dilated     Influenza Age 5 to Adult     GLAUCOMA SCREENING Q2Y     Bone Densitometry (Dexa) Screening     Pneumococcal 65+ years (1 of 1 - PPSV23)       Vitals:    20 1011   BP: 134/59   Pulse: 75   Resp: 18   Temp: 98 °F (36.7 °C)   TempSrc: Oral   SpO2: 96%   Weight: 293 lb 4.8 oz (133 kg)   Height: 5' 4\" (1.626 m)   PainSc:   5   PainLoc: Generalized       Wt Readings from Last 3 Encounters:   20 293 lb 4.8 oz (133 kg)   19 294 lb 14.4 oz (133.8 kg)   19 287 lb 14.4 oz (130.6 kg)     Temp Readings from Last 3 Encounters:   20 98 °F (36.7 °C) (Oral)   19 97.3 °F (36.3 °C) (Oral)   19 98.2 °F (36.8 °C) (Oral)     BP Readings from Last 3 Encounters:   20 134/59   19 145/65   19 127/46     Pulse Readings from Last 3 Encounters:   20 75   19 85   19 80       Lab Results   Component Value Date/Time    Hemoglobin A1c 7.0 (H) 2019 09:52 AM    Hemoglobin A1c (POC) 6.7 2017 03:34 PM    Hemoglobin A1c, External 8.3 2019       Coordination of Care Questionnaire:   1) Have you been to an emergency room, urgent care, or hospitalized since your last visit?   no       2. Have seen or consulted any other health care provider since your last visit? NO    3) Do you have an Advanced Directive/ Living Will in place?  NO  If yes, do we have a copy on file NO  If no, would you like information NO    Patient is accompanied by self I have received verbal consent from [place] to discuss any/all medical information while they are present in the room.

## 2020-02-26 NOTE — PROGRESS NOTES
HISTORY OF PRESENT ILLNESS  Estrellita Kim is a 72 y.o. female. HPI  Patient here for f/u after last visit of Type 2 diabetes mellitus from August 2019     Lost 1 lb ( wearing raza boot )   Had TIA  In sept 2019      Log is good           Old history :     She tried Toujeo and she stopped it as she felt unwell   SHE ALSO STOPPED TRULICITY   SHE HAS LOT OF STRESS           Old history     Last visit was   Dec    2017     10 months of GAP   She is unable to get [place]   She has not taken it for 3 weeks   Her sugars are higher  She stopped janumet xr for quite some time, unclear as to why         Old history     She is here for feet exam for diabetic shoes   She has severe DJD   She is diagnosed with RA    Did  get meter /log    Gained 7  lbs   Blood sugars are better         Old history :     Referred : by pcp- dr. Jose Pierce    H/o diabetes for 12 years   Has seen Dr. Ronnie Taveras  Thus far       Current A1C is 11.1 % from March 2014  and symptoms/problems include none     Current diabetic medications include mix 50/50    30 units AM and 35 units PM and tradjenta . Current monitoring regimen: none  Home blood sugar records: trend: fluctuating a bit  Any episodes of hypoglycemia?  yes - multiple    Weight trend: increasing steadily  Prior visit with dietician: no  Current diet: \"unhealthy\" diet in general  Current exercise: no regular exercise    Known diabetic complications: nephropathy and peripheral neuropathy  Cardiovascular risk factors: dyslipidemia, diabetes mellitus, obesity, sedentary life style, hypertension    Eye exam current (within one year): yes  SRINIVASA: no     Past Medical History:   Diagnosis Date    Asthma     [facility] [address]     HTN (hypertension)     Osteoarthritis 01/2019    Dr. Gagan Finn (Rheumatoid)     Past Surgical History:   Procedure Laterality Date    HX CARPAL TUNNEL RELEASE      both hands    HX DILATION AND CURETTAGE  2011    HX TUBAL LIGATION  6/1985     Current Outpatient Medications   Medication Sig    clobetasoL-emollient (TEMOVATE-E) 0.05 % topical cream LAMBERTO EXT AA BID FOR 10 DAYS    clopidogreL (PLAVIX) 75 mg tab TK 1 T PO QD    rosuvastatin (CRESTOR) 5 mg tablet TK 1 T PO QD    JANUMET XR 50-1,000 mg TM24 TAKE 1 TABLET BY MOUTH TWICE DAILY( WITH MEALS)    dulaglutide (TRULICITY) 6.70 FN/5.6 mL sub-q pen 0.5 mL by SubCUTAneous route every seven (7) days.  insulin glargine U-300 conc (TOUJEO MAX U-300 SOLOSTAR) 300 unit/mL (3 mL) inpn 50 Units by SubCUTAneous route nightly.  insulin lispro (HUMALOG KWIKPEN INSULIN) 200 unit/mL (3 mL) inpn Inject 8 units before breakfast and lunch, 10 units before dinner Plus sliding scale to max 65 units daily.  leucovorin calcium (WELLCOVORIN) 5 mg tablet TK 1 T PO D    sertraline (ZOLOFT) 50 mg tablet TK 1 AND 1/2 TS PO QD    albuterol (PROAIR HFA) 90 mcg/actuation inhaler Take  by inhalation.  methotrexate (RHEUMATREX) 2.5 mg tablet Take 2.5 mg by mouth every Tuesday.  ONETOUCH VERIO strip USE FOR TESTING FOUR TIMES DAILY    ergocalciferol (DRISDOL) 50,000 unit capsule Take 1 Cap by mouth every seven (7) days.  beclomethasone (QVAR) 40 mcg/actuation inhaler Take 1 Puff by inhalation two (2) times a day.  VOLTAREN 1 % topical gel as needed.  acetaminophen (TYLENOL) 325 mg tablet Take  by mouth every four (4) hours as needed for Pain.  empagliflozin (JARDIANCE) 25 mg tablet Take 1 Tab by mouth daily.  SITagliptin-metFORMIN (JANUMET XR) 50-1,000 mg TM24 Take 1 Tab by mouth two (2) times daily (with meals). To use with savings coupons    clonazePAM (KLONOPIN) 0.5 mg tablet TK 1 T PO QD HS    insulin lispro (HUMALOG) 100 unit/mL kwikpen Increase to 8 units before breakfast, 8 units before lunch, and 10 units before dinner. Plus sliding scale. STOP MIXED INSULIN    glucose blood VI test strips (TRUE METRIX GLUCOSE TEST STRIP) strip Test 4 times daily.  Dx code E11.65    lancets (TRUEPLUS LANCETS) 28 gauge misc Test 4 times daily. Dx code E11.65 (Patient not taking: Reported on 5/65/2261)    folic acid (FOLVITE) 1 mg tablet Take  by mouth daily.  meloxicam (MOBIC) 15 mg tablet Take 15 mg by mouth daily. No current facility-administered medications for this visit. Review of Systems   Constitutional: Negative. HENT: Negative. Eyes: Negative for pain and redness. Respiratory: Negative. Cardiovascular: Negative for chest pain, palpitations and leg swelling. Gastrointestinal: Negative. Negative for constipation. Genitourinary: Negative. Musculoskeletal: Negative for myalgias. Skin: Negative. Neurological: Negative. Endo/Heme/Allergies: Negative. Psychiatric/Behavioral: Negative for depression and memory loss. The patient does not have insomnia. Physical Exam   Constitutional: She is oriented to person, place, and time. She appears well-developed and well-nourished. HENT:   Head: Normocephalic. Eyes: Conjunctivae and EOM are normal. Pupils are equal, round, and reactive to light. Neck: Normal range of motion. Neck supple. No JVD present. No tracheal deviation present. No thyromegaly present. Cardiovascular: Normal rate, regular rhythm and normal heart sounds. No murmur heard. Pulmonary/Chest: Breath sounds normal.   Abdominal: Soft. Bowel sounds are normal.   Musculoskeletal: Normal range of motion. Lymphadenopathy:     She has no cervical adenopathy. Neurological: She is alert and oriented to person, place, and time. She has normal reflexes. Skin: Skin is warm. Psychiatric: She has a normal mood and affect.        Diabetic feet exam : dec 2017   Dr. Heidy Mcdonald      H/o partial or complete amputation of foot : n  H/o previous foot ulceration : n  H/o pre - ulcerative callus : y  H/o peripheral neuropathy and callus : yes  H/o poor circulation     SRINIVASA   : n  Foot deformity : brace on left foot for lymphedema ( she uses the pump )  In [employer] Diabetic foot exam: oct 2018     Left Foot:   Visual Exam: normal    Pulse DP: 2+ (normal)   Filament test: normal sensation    Vibratory sensation: normal      Right Foot:   Visual Exam: normal    Pulse DP: 2+ (normal)   Filament test: normal sensation    Vibratory sensation: normal       Lab Results   Component Value Date/Time    Hemoglobin A1c 7.0 (H) 08/09/2019 09:52 AM    Hemoglobin A1c 7.8 (H) 10/15/2018 07:43 AM    Hemoglobin A1c 6.7 (H) 08/24/2017 12:00 AM    Glucose 84 08/09/2019 09:52 AM    Glucose  06/16/2015 04:21 PM    Microalb/Creat ratio (ug/mg creat.) 2.2 08/09/2019 09:52 AM    LDL, calculated 94 08/09/2019 09:52 AM    Creatinine 0.80 08/09/2019 09:52 AM    Hemoglobin A1c, External 8.3 04/17/2019            ASSESSMENT and PLAN    1. Type 2 DM, uncontrolled : A1c is   7.1 %     From      Feb 2020    compared to    7 %     From  August 2019     compared to    8.1 %     From April 2019     comapred to  7.8 %     From     Oct 2018       Compared to   6.7 %    From  Aug 2017  Compared to  6.4 %     From    April 2017   compared to   6.4 %    From oct 2016  compared to    8.1 %     From   June 2016  compared to   7.9 %     From sept 2015 compared to   8 %   From June 2015 compared to   9 %    From march 2015       Maintained  glycemic control,  Log is good     She has improved compliance with toujeo , trulicity , humalog and janumet xr   Stopped jardiance for yeast infections , but resuming it now given that she has a stroke     Patient is advised about checking blood sugars 1-2  times a day and maintaining log book. The danger of having low blood sugars has been explained with inappropriate use of insulin  Patient voiced understanding and using the printed instructions at home. Hypoglycemia management has been explained to the patient. lab results and schedule of future lab studies reviewed with patient      2. Hypoglycemia :  Educated on treating the hypoglycemia.  Discussed proper insulin use and prescribed    3. HTN : continue lisinopril. Patient is educated about importance of compliance with anti-hypertensives especially ARB/ACEI    4. Dyslipidemia : after the TIA , got onto statin and plavix   Patient is educated about benefits and adverse effects of statins and explained how benefits outweigh risk. 5.   Diabetic complications :     A. Retinopathy-NO   educated on this complication,  regular f/u with ophthalmologist encouraged    B. Nephropathy - NO       C. Peripheral Neuropathy -   , mild   educated on this disease and indicated improvement with good and stable glycemic control    D. PAD : NO         E : CAD : NO      F. TIA  - sept 2019   : ADDING jardiance         she is euthyroid jan 2020       8. Obesity  : Body mass index is 50.34 kg/m².          Labs bnv in 3 months

## 2020-05-17 RX ORDER — INSULIN GLARGINE 300 U/ML
INJECTION, SOLUTION SUBCUTANEOUS
Qty: 15 ML | Refills: 6 | Status: SHIPPED | OUTPATIENT
Start: 2020-05-17 | End: 2021-07-29

## 2020-06-21 RX ORDER — SITAGLIPTIN AND METFORMIN HYDROCHLORIDE 50; 1000 MG/1; MG/1
TABLET, FILM COATED, EXTENDED RELEASE ORAL
Qty: 60 TAB | Refills: 6 | Status: SHIPPED | OUTPATIENT
Start: 2020-06-21 | End: 2020-10-07

## 2020-08-07 RX ORDER — INSULIN LISPRO 200 [IU]/ML
INJECTION, SOLUTION SUBCUTANEOUS
Qty: 15 ML | Refills: 6 | Status: SHIPPED | OUTPATIENT
Start: 2020-08-07 | End: 2021-10-15 | Stop reason: SDUPTHER

## 2020-10-07 ENCOUNTER — OFFICE VISIT (OUTPATIENT)
Dept: ENDOCRINOLOGY | Age: 65
End: 2020-10-07
Payer: COMMERCIAL

## 2020-10-07 VITALS
HEIGHT: 64 IN | HEART RATE: 79 BPM | WEIGHT: 285.6 LBS | TEMPERATURE: 98.3 F | BODY MASS INDEX: 48.76 KG/M2 | RESPIRATION RATE: 18 BRPM | OXYGEN SATURATION: 95 % | DIASTOLIC BLOOD PRESSURE: 65 MMHG | SYSTOLIC BLOOD PRESSURE: 136 MMHG

## 2020-10-07 DIAGNOSIS — E11.65 UNCONTROLLED TYPE 2 DIABETES MELLITUS WITH HYPERGLYCEMIA (HCC): Primary | ICD-10-CM

## 2020-10-07 DIAGNOSIS — I10 ESSENTIAL HYPERTENSION: ICD-10-CM

## 2020-10-07 DIAGNOSIS — E78.2 MIXED HYPERLIPIDEMIA: ICD-10-CM

## 2020-10-07 DIAGNOSIS — E66.01 CLASS 3 SEVERE OBESITY DUE TO EXCESS CALORIES WITH SERIOUS COMORBIDITY AND BODY MASS INDEX (BMI) OF 45.0 TO 49.9 IN ADULT (HCC): ICD-10-CM

## 2020-10-07 PROCEDURE — 99214 OFFICE O/P EST MOD 30 MIN: CPT | Performed by: INTERNAL MEDICINE

## 2020-10-07 RX ORDER — CLINDAMYCIN HYDROCHLORIDE 300 MG/1
300 CAPSULE ORAL 3 TIMES DAILY
COMMUNITY
End: 2021-04-15 | Stop reason: ALTCHOICE

## 2020-10-07 RX ORDER — DULAGLUTIDE 0.75 MG/.5ML
0.75 INJECTION, SOLUTION SUBCUTANEOUS
Qty: 4 PEN | Refills: 6 | Status: SHIPPED | OUTPATIENT
Start: 2020-10-07 | End: 2020-10-15 | Stop reason: SDUPTHER

## 2020-10-07 NOTE — PROGRESS NOTES
HISTORY OF PRESENT ILLNESS  Srinivasa Felder is a 72 y.o. female. HPI  Patient here for f/u after last visit of Type 2 diabetes mellitus from feb 2020     Lost 8 lbs   She is wanting to stay off of  The janumet xr and see how that goes - big pill   Her log has sugars over 200 mg on many occasions         Old hsitory      Lost 1 lb ( wearing raza boot )   Had TIA  In sept 2019    Log is good           Old history :     She tried Toujeo and she stopped it as she felt unwell   SHE ALSO STOPPED TRULICITY   SHE HAS LOT OF STRESS           Old history     Last visit was   Dec    2017     10 months of GAP   She is unable to get [place]   She has not taken it for 3 weeks   Her sugars are higher  She stopped janumet xr for quite some time, unclear as to why         Old history     She is here for feet exam for diabetic shoes   She has severe DJD   She is diagnosed with RA    Did  get meter /log    Gained 7  lbs   Blood sugars are better         Old history :     Referred : by pcp- dr. Joycelyn Gustafson    H/o diabetes for 12 years   Has seen Dr. Maribeth Johnson  Thus far       Current A1C is 11.1 % from March 2014  and symptoms/problems include none     Current diabetic medications include mix 50/50    30 units AM and 35 units PM and tradjenta . Current monitoring regimen: none  Home blood sugar records: trend: fluctuating a bit  Any episodes of hypoglycemia?  yes - multiple    Weight trend: increasing steadily  Prior visit with dietician: no  Current diet: \"unhealthy\" diet in general  Current exercise: no regular exercise    Known diabetic complications: nephropathy and peripheral neuropathy  Cardiovascular risk factors: dyslipidemia, diabetes mellitus, obesity, sedentary life style, hypertension    Eye exam current (within one year): yes  SRINIVASA: no     Past Medical History:   Diagnosis Date    Asthma     [facility]) ([facility] [address]     HTN (hypertension)     Osteoarthritis 01/2019    Dr. Nicholas Alcantar (Rheumatoid)     Past Surgical History: Procedure Laterality Date    HX CARPAL TUNNEL RELEASE      both hands    HX DILATION AND CURETTAGE  2011    HX TUBAL LIGATION  6/1985     Current Outpatient Medications   Medication Sig    clindamycin (CLEOCIN) 300 mg capsule Take 300 mg by mouth three (3) times daily.  insulin lispro (HumaLOG KwikPen Insulin) 200 unit/mL (3 mL) inpn 8 UNITS BEFORE BREAKFAST AND LUNCH AND 10 UNITS BEFORE DINNER PLUS SLIDING SCALE TO 65 UNITS MAX DAILY    Janumet XR 50-1,000 mg TM24 TAKE 1 TABLET BY MOUTH TWICE DAILY( WITH MEALS)    Toujeo Max U-300 SoloStar 300 unit/mL (3 mL) inpn INJECT 50 UNITS SUBCUTANEOUSLY EVERY EVENING    clopidogreL (PLAVIX) 75 mg tab TK 1 T PO QD    rosuvastatin (CRESTOR) 5 mg tablet TK 1 T PO QD    dulaglutide (TRULICITY) 7.26 CZ/8.8 mL sub-q pen 0.5 mL by SubCUTAneous route every seven (7) days.  leucovorin calcium (WELLCOVORIN) 5 mg tablet TK 1 T PO D    sertraline (ZOLOFT) 50 mg tablet TK 1 AND 1/2 TS PO QD    albuterol (PROAIR HFA) 90 mcg/actuation inhaler Take  by inhalation.  methotrexate (RHEUMATREX) 2.5 mg tablet Take 2.5 mg by mouth every Tuesday. 8 tablets every Tuesday    ONETOUCH VERIO strip USE FOR TESTING FOUR TIMES DAILY    ergocalciferol (DRISDOL) 50,000 unit capsule Take 1 Cap by mouth every seven (7) days.  beclomethasone (QVAR) 40 mcg/actuation inhaler Take 1 Puff by inhalation two (2) times a day.  VOLTAREN 1 % topical gel as needed.  acetaminophen (TYLENOL) 325 mg tablet Take  by mouth every four (4) hours as needed for Pain.  clobetasoL-emollient (TEMOVATE-E) 0.05 % topical cream LAMBERTO EXT AA BID FOR 10 DAYS    empagliflozin (JARDIANCE) 25 mg tablet Take 1 Tab by mouth daily.  empagliflozin (JARDIANCE) 25 mg tablet Take 1 Tab by mouth daily.  folic acid (FOLVITE) 1 mg tablet Take  by mouth daily.  meloxicam (MOBIC) 15 mg tablet Take 15 mg by mouth daily. No current facility-administered medications for this visit.         Review of Systems Constitutional: Negative. HENT: Negative. Eyes: Negative for pain and redness. Respiratory: Negative. Cardiovascular: Negative for chest pain, palpitations and leg swelling. Gastrointestinal: Negative. Negative for constipation. Genitourinary: Negative. Musculoskeletal: Negative for myalgias. Skin: Negative. Neurological: Negative. Endo/Heme/Allergies: Negative. Psychiatric/Behavioral: Negative for depression and memory loss. The patient does not have insomnia. Physical Exam   Constitutional: She is oriented to person, place, and time. She appears well-developed and well-nourished. HENT:   Head: Normocephalic. Eyes: Conjunctivae and EOM are normal. Pupils are equal, round, and reactive to light. Neck: Normal range of motion. Neck supple. No JVD present. No tracheal deviation present. No thyromegaly present. Cardiovascular: Normal rate, regular rhythm and normal heart sounds. No murmur heard. Pulmonary/Chest: Breath sounds normal.   Abdominal: Soft. Bowel sounds are normal.   Musculoskeletal: Normal range of motion. Lymphadenopathy:     She has no cervical adenopathy. Neurological: She is alert and oriented to person, place, and time. She has normal reflexes. Skin: Skin is warm. Psychiatric: She has a normal mood and affect. Lab Results   Component Value Date/Time    Hemoglobin A1c 6.8 (H) 09/30/2020 09:20 AM    Hemoglobin A1c 7.0 (H) 08/09/2019 09:52 AM    Hemoglobin A1c 7.8 (H) 10/15/2018 07:43 AM    Glucose 124 (H) 09/30/2020 09:20 AM    Glucose  06/16/2015 04:21 PM    Microalbumin/Creat ratio (mg/g creat) 7 09/30/2020 09:20 AM    Microalbumin,urine random 1.70 09/30/2020 09:20 AM    LDL, calculated 92 09/30/2020 09:20 AM    Creatinine 0.84 09/30/2020 09:20 AM    Hemoglobin A1c, External 7.1 12/13/2019    Hemoglobin A1c, External 8.3 04/17/2019            ASSESSMENT and PLAN    1.  Type 2 DM, uncontrolled : A1c is   6.8 %     From   Sept 2020     Compared to   7.1 %     From      Feb 2020    compared to    7 %     From  August 2019     compared to    8.1 %     From April 2019     comapred to  7.8 %     From     Oct 2018       Compared to   6.7 %    From  Aug 2017  Compared to  6.4 %     From    April 2017   compared to   6.4 %    From oct 2016  compared to    8.1 %     From   June 2016  compared to   7.9 %     From sept 2015 compared to   8 %   From June 2015 compared to   9 %    From march 2015       Oct 2020 : She has done very well all along   So, I am agreeing with her to stay off janumet xr ( as she c/o that being a big pill ) for now   Stay on basal bolus regimen   She will stay on trulicity   Will try farxiga         Feb 2020   Maintained  glycemic control,  Log is good   She has improved compliance with toujeo , trulicity , humalog and janumet xr   Stopped jardiance for yeast infections , but resuming it now given that she has a stroke     Patient is advised about checking blood sugars 1-2  times a day and maintaining log book. lab results and schedule of future lab studies reviewed with patient      2. Hypoglycemia :  Educated on treating the hypoglycemia. Discussed proper insulin use and prescribed    3. HTN : continue lisinopril. Patient is educated about importance of compliance with anti-hypertensives especially ARB/ACEI    4. Dyslipidemia : after the TIA , got onto statin and plavix   Patient is educated about benefits and adverse effects of statins and explained how benefits outweigh risk. 5.   Diabetic complications :     A. Retinopathy-NO   educated on this complication,  regular f/u with ophthalmologist encouraged    B. Nephropathy - NO       C. Peripheral Neuropathy -   , mild   educated on this disease and indicated improvement with good and stable glycemic control    D. PAD : NO         E : CAD : NO      F. TIA  - sept 2019   : jardiance is not covered , so farxiga ordered        she is euthyroid jan 2020       8. Obesity  : Body mass index is 49.02 kg/m².          Labs bnv in 3 months

## 2020-10-07 NOTE — LETTER
10/7/20 Patient: Mau Chandler YOB: 1955 Date of Visit: 10/7/2020 Jacquelyn Méndez MD 
[address] [id number] VIA Facsimile: [phone] Dear Jacquelyn Méndez MD, Thank you for referring Ms. Mau Chandler to [place] [address] for evaluation. My notes for this consultation are attached. If you have questions, please do not hesitate to call me. I look forward to following your patient along with you. Sincerely, Giuliana Raines MD

## 2020-10-07 NOTE — PATIENT INSTRUCTIONS
[employer] STOP  jardiance 25 mg a day STARTING ON FARXIGA 10 MG    Before b-fast   
 
Hold off   ON   janumet xr 50/ 1gm twice a day with meals Check blood sugars immediately before each meal and at bedtime ( print and mail ) 
 
 stay on  trulicity 0.48 mg once a week   
 
 stay on  TOUJEO   50 units at night  
 
humalog UNIT - 200   insulin 8 units before breakfast, 8  units before lunch and 10  units before dinner. Also, add additional humalog  as follows with meals  If blood sugars are[de-identified] 
 
150-200 mg 1 units 201-250 mg 3 units 251-300 mg 5 units 301-350 mg 7 units 351-400 mg 9 units 401-450 mg 11 units 451-500 mg  13 units Less than 70 mg NO INSULIN 
 
 
 
 
Do not skip meals Do not eat in between meals Reduce carbs- pasta, rice, potatoes, bread Do not drink juices or sodas Donot eat peanut butter Do not eat muffins, biscuits Do not eat sugar free cookies and cakes Do not eat peaches, grapes, oranges and pine apples

## 2020-10-07 NOTE — PROGRESS NOTES
1. Have you been to the ER, urgent care clinic since your last visit? No  Hospitalized since your last visit? No    2. Have you seen or consulted any other health care providers outside of the [address] [place] [address] since your last visit? Include any pap smears or colon screening. No    Wt Readings from Last 3 Encounters:   10/07/20 285 lb 9.6 oz (129.5 kg)   02/26/20 293 lb 4.8 oz (133 kg)   08/13/19 294 lb 14.4 oz (133.8 kg)     Temp Readings from Last 3 Encounters:   10/07/20 98.3 °F (36.8 °C) (Oral)   02/26/20 98 °F (36.7 °C) (Oral)   08/13/19 97.3 °F (36.3 °C) (Oral)     BP Readings from Last 3 Encounters:   10/07/20 136/65   02/26/20 134/59   08/13/19 145/65     Pulse Readings from Last 3 Encounters:   10/07/20 79   02/26/20 75   08/13/19 85     Lab Results   Component Value Date/Time    Hemoglobin A1c 6.8 (H) 09/30/2020 09:20 AM    Hemoglobin A1c (POC) 6.7 04/28/2017 03:34 PM    Hemoglobin A1c, External 7.1 12/13/2019     Patient has meter today.

## 2020-10-15 RX ORDER — DULAGLUTIDE 0.75 MG/.5ML
0.75 INJECTION, SOLUTION SUBCUTANEOUS
Qty: 4 PEN | Refills: 6 | Status: SHIPPED | OUTPATIENT
Start: 2020-10-15 | End: 2021-04-15 | Stop reason: DRUGHIGH

## 2021-04-15 ENCOUNTER — OFFICE VISIT (OUTPATIENT)
Dept: ENDOCRINOLOGY | Age: 66
End: 2021-04-15
Payer: COMMERCIAL

## 2021-04-15 VITALS
HEART RATE: 86 BPM | DIASTOLIC BLOOD PRESSURE: 59 MMHG | SYSTOLIC BLOOD PRESSURE: 127 MMHG | OXYGEN SATURATION: 96 % | TEMPERATURE: 98.2 F | HEIGHT: 64 IN | RESPIRATION RATE: 18 BRPM | BODY MASS INDEX: 48.83 KG/M2 | WEIGHT: 286 LBS

## 2021-04-15 DIAGNOSIS — E11.65 UNCONTROLLED TYPE 2 DIABETES MELLITUS WITH HYPERGLYCEMIA (HCC): Primary | ICD-10-CM

## 2021-04-15 DIAGNOSIS — I10 ESSENTIAL HYPERTENSION: ICD-10-CM

## 2021-04-15 DIAGNOSIS — E78.2 MIXED HYPERLIPIDEMIA: ICD-10-CM

## 2021-04-15 PROCEDURE — 99214 OFFICE O/P EST MOD 30 MIN: CPT | Performed by: INTERNAL MEDICINE

## 2021-04-15 RX ORDER — SERTRALINE HYDROCHLORIDE 100 MG/1
100 TABLET, FILM COATED ORAL DAILY
Qty: 30 TAB | Refills: 5 | Status: SHIPPED | OUTPATIENT
Start: 2021-04-15

## 2021-04-15 RX ORDER — DULAGLUTIDE 1.5 MG/.5ML
INJECTION, SOLUTION SUBCUTANEOUS
Qty: 2 ML | Refills: 4 | Status: SHIPPED | OUTPATIENT
Start: 2021-04-15 | End: 2021-10-15 | Stop reason: DRUGHIGH

## 2021-04-15 NOTE — PATIENT INSTRUCTIONS
SPECIFIC INSTRUCTIONS BELOW       INCREASE ZOLOFT  MG  A DAY       FARXIGA 10 MG    Before b-fast        Check blood sugars immediately before each meal and at bedtime ( print and mail )    Increase   trulicity 1.5 mg once a week  , stop 0.75 mg      stay on  TOUJEO   50 units at night     humalog UNIT - 200   insulin 8 units before breakfast, 8  units before lunch and 10  units before dinner. Also, add additional humalog  as follows with meals  If blood sugars are[de-identified]    150-200 mg 1 units    201-250 mg 3 units    251-300 mg 5 units    301-350 mg 7 units    351-400 mg 9 units    401-450 mg 11 units    451-500 mg  13 units     Less than 70 mg NO INSULIN          Do not skip meals  Do not eat in between meals    Reduce carbs- pasta, rice, potatoes, bread   Do not drink juices or sodas  Donot eat peanut butter   Do not eat muffins, biscuits   Do not eat sugar free cookies and cakes   Do not eat peaches, grapes, oranges and pine apples           PAY ATTENTION TO THESE GENERAL INSTRUCTIONS     -ANY tests other than blood work, which you opt to do  outside [facility] imaging facilities, you are responsible for prior authorizations if  required   - HEALTH MAINTENANCE IS NOT GOING TO BE UP TO DATE ON YOUR AVS- PLEASE IGNORE   - YOUR MED LIST IS NOT UP TO DATE AS SOME CHANGES ARE BEING MADE AFTER THE VISIT - FOLLOW SPECIFIC INSTRUCTIONS  ABOVE     Results     *Normal results will not be notified by a phone call starting January 1 2021   *If you have an upcoming visit, the results will be discussed at the visit   *Please sign up for MY CHART if you want access to your lab and test results  *Abnormal results which require immediate attention will be notified by phone call   *Abnormal results which do not require immediate assistance will be notified in 1-2 weeks       Refills    -    have your pharmacy send us a refill request  Phone calls  -  Allow  24 hrs.  for non-urgent calls to be returned  Prior authorization - It may take 2-4 weeks to process  Forms  -  FMLA, DMV etc., will take up to 2 weeks to process  Cancellations - please notify the office 2 days in advance   Samples  - will only be dispensed at visits     --------------------------------------------------------------------------------------------

## 2021-04-15 NOTE — PROGRESS NOTES
HISTORY OF PRESENT ILLNESS  Neli Perez is a 77 y.o. female. HPI  Patient here for f/u after last visit of Type 2 diabetes mellitus from Oct 7  2020     Gained 1 lb   She is doing well   SHE SAYS SHE IS EATING UNDER STRESS       Oct 2020     Lost 8 lbs   She is wanting to stay off of  The janumet xr and see how that goes - big pill   Her log has sugars over 200 mg on many occasions         Old hsitory      Lost 1 lb ( wearing raza boot )   Had TIA  In sept 2019    Log is good       Old history     Last visit was   Dec    2017   10 months of GAP   She is unable to get [place]   She has not taken it for 3 weeks   Her sugars are higher  She stopped janumet xr for quite some time, unclear as to why       Old history :     Referred : by pcp- dr. Tania Shaw  H/o diabetes for 12 years   Has seen Dr. Dawit Fry  Thus far   Current A1C is 11.1 % from March 2014  and symptoms/problems include none   Current diabetic medications include mix 50/50    30 units AM and 35 units PM and tradjenta . Review of Systems   Constitutional: Negative. Psychiatric/Behavioral:yes  depression and memory loss. The patient does not have insomnia. Physical Exam   Constitutional: She is oriented to person, place, and time. She appears well-developed and well-nourished. Psychiatric: She is saying she is depressed         Lab Results   Component Value Date/Time    Hemoglobin A1c 6.8 (H) 04/07/2021 08:37 AM    Hemoglobin A1c 6.8 (H) 09/30/2020 09:20 AM    Hemoglobin A1c 7.0 (H) 08/09/2019 09:52 AM    Glucose 93 04/07/2021 08:37 AM    Glucose  06/16/2015 04:21 PM    Microalbumin/Creat ratio (mg/g creat) 11 04/07/2021 08:37 AM    Microalbumin,urine random 2.29 04/07/2021 08:37 AM    LDL, calculated 93.4 04/07/2021 08:37 AM    Creatinine 0.66 04/07/2021 08:37 AM    Hemoglobin A1c, External 7.1 12/13/2019    Hemoglobin A1c, External 8.3 04/17/2019            ASSESSMENT and PLAN    1.  Type 2 DM, uncontrolled : A1c is   6.8 %      From April 2021    Compared to     6.8 %     From   Sept 2020     Compared to   7.1 %     From      Feb 2020    compared to    7 %     From  August 2019     compared to    8.1 %     From April 2019     comapred to  7.8 %     From     Oct 2018       Compared to   6.7 %    From  Aug 2017  Compared to  6.4 %     From    April 2017 April 2021  Stay on basal bolus regimen   She will stay on trulicity   Increase trulicity to  1.5  mg a day   Stay on [facility]         Oct 2020 : She has done very well all along   So, I am agreeing with her to stay off janumet xr ( as she c/o that being a big pill ) for now   Stay on basal bolus regimen   She will stay on trulicity   Will try farxiga         Feb 2020   Maintained  glycemic control,  Log is good   She has improved compliance with toujeo , trulicity , humalog and janumet xr   Stopped jardiance for yeast infections , but resuming it now given that she has a stroke           2. Hypoglycemia :  Educated on treating the hypoglycemia. Discussed proper insulin use and prescribed    3. HTN : continue lisinopril. 4.  Dyslipidemia : after the TIA , got onto statin and plavix       5. Diabetic complications :     A. Retinopathy-NO   educated on this complication,  regular f/u with ophthalmologist encouraged    B. Nephropathy - NO       C. Peripheral Neuropathy -   , mild   educated on this disease and indicated improvement with good and stable glycemic control      F. TIA  - sept 2019   : jardiance is not covered , so farxiga ordered    she is euthyroid jan 2020       8. Obesity  : Body mass index is 49.09 kg/m².    EATING UNDER STRESS AND DEPRESSION   Increased zoloft  To  100 mg a day         Reviewed results with patient and discussed the labs being ordered today/bnv  Patient voiced understanding of plan of care

## 2021-04-15 NOTE — LETTER
4/15/2021 Patient: Vijay Hirsch YOB: 1955 Date of Visit: 4/15/2021 Ashly Aranda MD 
[facility] [address] [facility] [address] Via Fax: [phone] Dear Ashly Aranda MD, Thank you for referring Ms. Vijay Hirsch to [address] [place] [address] for evaluation. My notes for this consultation are attached. If you have questions, please do not hesitate to call me. I look forward to following your patient along with you. Sincerely, Abraham Tapia MD

## 2021-04-15 NOTE — PROGRESS NOTES
Blane Quinteros is a 77 y.o. female here for   Chief Complaint   Patient presents with    Diabetes       1. Have you been to the ER, urgent care clinic since your last visit? Hospitalized since your last visit? -no    2. Have you seen or consulted any other health care providers outside of the [address] [place] [address] since your last visit?   Include any pap smears or colon screening.-Rheumatology Dr. Radha Dwyer started on arthritis infusion

## 2021-07-29 RX ORDER — INSULIN GLARGINE 300 U/ML
INJECTION, SOLUTION SUBCUTANEOUS
Qty: 15 ML | Refills: 6 | Status: SHIPPED | OUTPATIENT
Start: 2021-07-29 | End: 2021-10-15 | Stop reason: SDUPTHER

## 2021-07-29 RX ORDER — INSULIN GLARGINE 300 U/ML
INJECTION, SOLUTION SUBCUTANEOUS
Qty: 15 ML | Refills: 6 | Status: SHIPPED | OUTPATIENT
Start: 2021-07-29 | End: 2021-10-15 | Stop reason: ALTCHOICE

## 2021-10-15 ENCOUNTER — OFFICE VISIT (OUTPATIENT)
Dept: ENDOCRINOLOGY | Age: 66
End: 2021-10-15
Payer: COMMERCIAL

## 2021-10-15 VITALS
HEART RATE: 66 BPM | OXYGEN SATURATION: 96 % | BODY MASS INDEX: 48.25 KG/M2 | DIASTOLIC BLOOD PRESSURE: 63 MMHG | SYSTOLIC BLOOD PRESSURE: 131 MMHG | TEMPERATURE: 97.5 F | HEIGHT: 64 IN | RESPIRATION RATE: 16 BRPM | WEIGHT: 282.6 LBS

## 2021-10-15 DIAGNOSIS — I10 ESSENTIAL HYPERTENSION: ICD-10-CM

## 2021-10-15 DIAGNOSIS — E78.2 MIXED HYPERLIPIDEMIA: ICD-10-CM

## 2021-10-15 DIAGNOSIS — E11.65 UNCONTROLLED TYPE 2 DIABETES MELLITUS WITH HYPERGLYCEMIA (HCC): Primary | ICD-10-CM

## 2021-10-15 PROCEDURE — 3051F HG A1C>EQUAL 7.0%<8.0%: CPT | Performed by: INTERNAL MEDICINE

## 2021-10-15 PROCEDURE — 99214 OFFICE O/P EST MOD 30 MIN: CPT | Performed by: INTERNAL MEDICINE

## 2021-10-15 RX ORDER — PEN NEEDLE, DIABETIC 31 GX3/16"
NEEDLE, DISPOSABLE MISCELLANEOUS
Qty: 200 PEN NEEDLE | Refills: 6 | Status: SHIPPED | OUTPATIENT
Start: 2021-10-15

## 2021-10-15 RX ORDER — DULAGLUTIDE 3 MG/.5ML
3 INJECTION, SOLUTION SUBCUTANEOUS
Qty: 4 EACH | Refills: 6 | Status: SHIPPED | OUTPATIENT
Start: 2021-10-15 | End: 2022-05-12 | Stop reason: SDUPTHER

## 2021-10-15 RX ORDER — INSULIN LISPRO 200 [IU]/ML
INJECTION, SOLUTION SUBCUTANEOUS
Qty: 15 ML | Refills: 6 | Status: SHIPPED | OUTPATIENT
Start: 2021-10-15

## 2021-10-15 RX ORDER — INSULIN GLARGINE 300 U/ML
INJECTION, SOLUTION SUBCUTANEOUS
Qty: 15 ML | Refills: 6 | Status: SHIPPED | OUTPATIENT
Start: 2021-10-15 | End: 2022-05-12 | Stop reason: SDUPTHER

## 2021-10-15 NOTE — LETTER
10/17/2021    Patient: Edilma Cheng   YOB: 1955   Date of Visit: 10/15/2021     Brandie Steiner MD  [address] [place] [address]  Via Fax: [phone]    Dear Brandie Steiner MD,      Thank you for referring Ms. Edilma Cheng to [address] [place] [address] for evaluation. My notes for this consultation are attached. If you have questions, please do not hesitate to call me. I look forward to following your patient along with you.       Sincerely,    Geoff Jorge MD

## 2021-10-15 NOTE — PATIENT INSTRUCTIONS
SPECIFIC INSTRUCTIONS BELOW       ZOLOFT   100 MG  A DAY     FARXIGA 10 MG    Before b-fast        Check blood sugars immediately before each meal and at bedtime ( print and mail )    Increase   trulicity  3 mg once a week  , stop 1.5 mg      stay on  TOUJEO  MAX    50 units at night     humalog UNIT - 200   insulin 8 units before breakfast, 8  units before lunch and 10  units before dinner.      Also, add additional humalog  as follows with meals  If blood sugars are[de-identified]    150-200 mg 1 units    201-250 mg 3 units    251-300 mg 5 units    301-350 mg 7 units    351-400 mg 9 units    401-450 mg 11 units    451-500 mg  13 units     Less than 70 mg NO INSULIN          Do not skip meals  Do not eat in between meals    Reduce carbs- pasta, rice, potatoes, bread   Do not drink juices or sodas  Donot eat peanut butter   Do not eat muffins, biscuits   Do not eat sugar free cookies and cakes   Do not eat peaches, grapes, oranges and pine apples         -------------PAY ATTENTION TO THESE GENERAL INSTRUCTIONS -----------------      - The medications prescribed at this visit will not be available at pharmacy until 6 pm       - YOUR MED LIST IS NOT UP TO DATE AS SOME CHANGES ARE BEING MADE AFTER THE VISIT - FOLLOW SPECIFIC INSTRUCTIONS  ABOVE     -ANY tests other than blood work, which you opt to do  outside the  [facility] imaging facilities, you are responsible for prior authorizations if  required    - [address] UP TO DATE ON YOUR AVS- PLEASE IGNORE     Results     *Normal results will not be notified by a phone call starting January 1 2021   *If you have an upcoming visit, the results will be discussed at the visit   *Please sign up for MY CHART if you want access to your lab and test results  *Abnormal results which require immediate attention will be notified by phone call   *Abnormal results which do not require immediate assistance will be notified in 1-2 weeks       Refills    -    have your pharmacy send us a refill request . Refills are done max for one year and a visit is a must before refills are extended    Follow up appointments -  highly encourage you to make it when you are checking out. We can accommodate you into the schedule based on your clinical situation, but not for extending refills beyond a year. Labs are important to give refills and is important to get labs before the visit     Phone calls  -  Allow  24 hrs.  for non-urgent calls to be returned  Prior authorization - It may take 2-4 weeks to process  Forms  -  FMLA, DMV etc., will take up to 2 weeks to process  Cancellations - please notify the office 2 days in advance   Samples  - will only be dispensed at visits       If not showing for the appointments and cancelling appointments within 24 hours are kept track of and three  of such situations in  two consecutive years will likely be considered for termination from the practice    -------------------------------------------------------------------------------------------------------------------

## 2021-10-15 NOTE — PROGRESS NOTES
HISTORY OF PRESENT ILLNESS  Apple Brandon is a 77 y.o. female. HPI  Patient here for f/u after last visit of Type 2 diabetes mellitus from April 2021   She got cortisone shots in the interim         April 2021   Gained 1 lb   She is doing well   SHE SAYS SHE IS EATING UNDER STRESS         Old history :     Referred : by pcp- dr. Duy Vieyra  H/o diabetes for 12 years   Has seen Dr. Richard Campos  Thus far   Current A1C is 11.1 % from March 2014  and symptoms/problems include none   Current diabetic medications include mix 50/50    30 units AM and 35 units PM and tradjenta . Review of Systems   Constitutional: Negative. Psychiatric/Behavioral:yes  depression and memory loss. The patient does not have insomnia. Physical Exam   Constitutional: She is oriented to person, place, and time. She appears well-developed and well-nourished. Psychiatric: She is saying she is depressed       No new  Labs       ASSESSMENT and PLAN    1. Type 2 DM, uncontrolled : A1c is   6.8 %      From   April 2021    Compared to     6.8 %     From   Sept 2020     Compared to   7.1 %     From      Feb 2020    compared to    7 %     From  August 2019     compared to    8.1 %     From April 2019     comapred to  7.8 %     From     Oct 2018       Compared to   6.7 %    From  Aug 2017  Compared to  6.4 %     From    April 2017         Oct 2021   Stay on basal bolus regimen - changed to toujeo max  Increase trulicity to  3  mg a day   Stay on [facility]       April 2021  Stay on basal bolus regimen   She will stay on trulicity   Increase trulicity to  1.5  mg a day   Stay on [facility]     Oct 2020 : She has done very well all along   So, I am agreeing with her to stay off janumet xr ( as she c/o that being a big pill ) for now   Stay on basal bolus regimen   She will stay on trulicity   Will try farxiga       2. Hypoglycemia :  Educated on treating the hypoglycemia. Discussed proper insulin use and prescribed    3. HTN : continue lisinopril.     4. Dyslipidemia : after the TIA , got onto statin and plavix       5. Diabetic complications :     A. Retinopathy-NO   educated on this complication,  regular f/u with ophthalmologist encouraged    B. Nephropathy - NO       C. Peripheral Neuropathy -   , mild   educated on this disease and indicated improvement with good and stable glycemic control      F. TIA  - sept 2019   : jardiance is not covered , so farxiga ordered    she is euthyroid jan 2020       8. Obesity  : Body mass index is 48.51 kg/m².    EATING UNDER STRESS AND DEPRESSION   Increased zoloft  To  100 mg a day         Reviewed results with patient and discussed the labs being ordered today/bnv  Patient voiced understanding of plan of care

## 2021-10-16 LAB
ALBUMIN SERPL-MCNC: 3.5 G/DL (ref 3.5–5)
ALBUMIN/GLOB SERPL: 1 {RATIO} (ref 1.1–2.2)
ALP SERPL-CCNC: 118 U/L (ref 45–117)
ALT SERPL-CCNC: 22 U/L (ref 12–78)
ANION GAP SERPL CALC-SCNC: 0 MMOL/L (ref 5–15)
AST SERPL-CCNC: 10 U/L (ref 15–37)
BILIRUB SERPL-MCNC: 0.6 MG/DL (ref 0.2–1)
BUN SERPL-MCNC: 17 MG/DL (ref 6–20)
BUN/CREAT SERPL: 21 (ref 12–20)
CALCIUM SERPL-MCNC: 9.1 MG/DL (ref 8.5–10.1)
CHLORIDE SERPL-SCNC: 110 MMOL/L (ref 97–108)
CHOLEST SERPL-MCNC: 197 MG/DL
CO2 SERPL-SCNC: 30 MMOL/L (ref 21–32)
CREAT SERPL-MCNC: 0.81 MG/DL (ref 0.55–1.02)
CREAT UR-MCNC: 189 MG/DL
EST. AVERAGE GLUCOSE BLD GHB EST-MCNC: 166 MG/DL
GLOBULIN SER CALC-MCNC: 3.6 G/DL (ref 2–4)
GLUCOSE SERPL-MCNC: 94 MG/DL (ref 65–100)
HBA1C MFR BLD: 7.4 % (ref 4–5.6)
HDLC SERPL-MCNC: 95 MG/DL
HDLC SERPL: 2.1 {RATIO} (ref 0–5)
LDLC SERPL CALC-MCNC: 93.8 MG/DL (ref 0–100)
MICROALBUMIN UR-MCNC: 1.17 MG/DL
MICROALBUMIN/CREAT UR-RTO: 6 MG/G (ref 0–30)
POTASSIUM SERPL-SCNC: 4.7 MMOL/L (ref 3.5–5.1)
PROT SERPL-MCNC: 7.1 G/DL (ref 6.4–8.2)
SODIUM SERPL-SCNC: 140 MMOL/L (ref 136–145)
TRIGL SERPL-MCNC: 41 MG/DL (ref ?–150)
VLDLC SERPL CALC-MCNC: 8.2 MG/DL

## 2022-03-19 PROBLEM — E66.01 OBESITY, MORBID (HCC): Status: ACTIVE | Noted: 2017-12-11

## 2022-04-09 LAB
ALBUMIN SERPL-MCNC: 4.3 G/DL (ref 3.8–4.8)
ALBUMIN/CREAT UR: 3 MG/G CREAT (ref 0–29)
ALBUMIN/GLOB SERPL: 1.7 {RATIO} (ref 1.2–2.2)
ALP SERPL-CCNC: 104 IU/L (ref 44–121)
ALT SERPL-CCNC: 11 IU/L (ref 0–32)
AST SERPL-CCNC: 12 IU/L (ref 0–40)
BILIRUB SERPL-MCNC: 0.6 MG/DL (ref 0–1.2)
BUN SERPL-MCNC: 17 MG/DL (ref 8–27)
BUN/CREAT SERPL: 20 (ref 12–28)
CALCIUM SERPL-MCNC: 9.2 MG/DL (ref 8.7–10.3)
CHLORIDE SERPL-SCNC: 108 MMOL/L (ref 96–106)
CHOLEST SERPL-MCNC: 195 MG/DL (ref 100–199)
CO2 SERPL-SCNC: 23 MMOL/L (ref 20–29)
CREAT SERPL-MCNC: 0.84 MG/DL (ref 0.57–1)
CREAT UR-MCNC: 210.7 MG/DL
EGFR: 76 ML/MIN/1.73
EST. AVERAGE GLUCOSE BLD GHB EST-MCNC: 151 MG/DL
GLOBULIN SER CALC-MCNC: 2.6 G/DL (ref 1.5–4.5)
GLUCOSE SERPL-MCNC: 82 MG/DL (ref 65–99)
HBA1C MFR BLD: 6.9 % (ref 4.8–5.6)
HDLC SERPL-MCNC: 80 MG/DL
IMP & REVIEW OF LAB RESULTS: NORMAL
LDLC SERPL CALC-MCNC: 106 MG/DL (ref 0–99)
MICROALBUMIN UR-MCNC: 6.7 UG/ML
POTASSIUM SERPL-SCNC: 4 MMOL/L (ref 3.5–5.2)
PROT SERPL-MCNC: 6.9 G/DL (ref 6–8.5)
SODIUM SERPL-SCNC: 144 MMOL/L (ref 134–144)
TRIGL SERPL-MCNC: 47 MG/DL (ref 0–149)
VLDLC SERPL CALC-MCNC: 9 MG/DL (ref 5–40)

## 2022-05-12 ENCOUNTER — OFFICE VISIT (OUTPATIENT)
Dept: ENDOCRINOLOGY | Age: 67
End: 2022-05-12
Payer: MEDICARE

## 2022-05-12 VITALS
WEIGHT: 293 LBS | HEIGHT: 64 IN | OXYGEN SATURATION: 97 % | BODY MASS INDEX: 50.02 KG/M2 | SYSTOLIC BLOOD PRESSURE: 138 MMHG | TEMPERATURE: 97.5 F | HEART RATE: 82 BPM | DIASTOLIC BLOOD PRESSURE: 60 MMHG

## 2022-05-12 DIAGNOSIS — E11.65 UNCONTROLLED TYPE 2 DIABETES MELLITUS WITH HYPERGLYCEMIA (HCC): Primary | ICD-10-CM

## 2022-05-12 DIAGNOSIS — I10 ESSENTIAL HYPERTENSION: ICD-10-CM

## 2022-05-12 DIAGNOSIS — E78.2 MIXED HYPERLIPIDEMIA: ICD-10-CM

## 2022-05-12 PROCEDURE — G8752 SYS BP LESS 140: HCPCS | Performed by: INTERNAL MEDICINE

## 2022-05-12 PROCEDURE — G8400 PT W/DXA NO RESULTS DOC: HCPCS | Performed by: INTERNAL MEDICINE

## 2022-05-12 PROCEDURE — 2022F DILAT RTA XM EVC RTNOPTHY: CPT | Performed by: INTERNAL MEDICINE

## 2022-05-12 PROCEDURE — 1101F PT FALLS ASSESS-DOCD LE1/YR: CPT | Performed by: INTERNAL MEDICINE

## 2022-05-12 PROCEDURE — G8536 NO DOC ELDER MAL SCRN: HCPCS | Performed by: INTERNAL MEDICINE

## 2022-05-12 PROCEDURE — G8754 DIAS BP LESS 90: HCPCS | Performed by: INTERNAL MEDICINE

## 2022-05-12 PROCEDURE — 99214 OFFICE O/P EST MOD 30 MIN: CPT | Performed by: INTERNAL MEDICINE

## 2022-05-12 PROCEDURE — G8432 DEP SCR NOT DOC, RNG: HCPCS | Performed by: INTERNAL MEDICINE

## 2022-05-12 PROCEDURE — 3017F COLORECTAL CA SCREEN DOC REV: CPT | Performed by: INTERNAL MEDICINE

## 2022-05-12 PROCEDURE — 1090F PRES/ABSN URINE INCON ASSESS: CPT | Performed by: INTERNAL MEDICINE

## 2022-05-12 PROCEDURE — G8427 DOCREV CUR MEDS BY ELIG CLIN: HCPCS | Performed by: INTERNAL MEDICINE

## 2022-05-12 PROCEDURE — 3044F HG A1C LEVEL LT 7.0%: CPT | Performed by: INTERNAL MEDICINE

## 2022-05-12 PROCEDURE — G8417 CALC BMI ABV UP PARAM F/U: HCPCS | Performed by: INTERNAL MEDICINE

## 2022-05-12 RX ORDER — DULAGLUTIDE 3 MG/.5ML
3 INJECTION, SOLUTION SUBCUTANEOUS
Qty: 4 EACH | Refills: 6 | Status: SHIPPED | OUTPATIENT
Start: 2022-05-12

## 2022-05-12 RX ORDER — MELOXICAM 15 MG/1
TABLET ORAL
COMMUNITY
Start: 2022-05-02

## 2022-05-12 RX ORDER — INSULIN GLARGINE 300 U/ML
INJECTION, SOLUTION SUBCUTANEOUS
Qty: 15 ML | Refills: 6 | Status: SHIPPED | OUTPATIENT
Start: 2022-05-12

## 2022-05-12 RX ORDER — ROSUVASTATIN CALCIUM 5 MG/1
TABLET, COATED ORAL
Qty: 90 TABLET | Refills: 1 | Status: SHIPPED | OUTPATIENT
Start: 2022-05-12

## 2022-05-12 NOTE — LETTER
5/15/2022    Patient: Urbano Woods   YOB: 1955   Date of Visit: 5/12/2022     Faby Menon MD  [address] [place] [address] Drive [address]  Via Fax: [phone]    Dear Faby Menon MD,      Thank you for referring Ms. Urbano Woods to [address] [place] [address] for evaluation. My notes for this consultation are attached. If you have questions, please do not hesitate to call me. I look forward to following your patient along with you.       Sincerely,    Blane Whaley MD

## 2022-05-12 NOTE — PATIENT INSTRUCTIONS
SPECIFIC INSTRUCTIONS BELOW       creestor 5 mg at night     ZOLOFT   100 MG  A DAY     FARXIGA 10 MG    Before b-fast        Check blood sugars immediately before each meal and at bedtime ( print and mail )    Increase   trulicity  3 mg once a week  , stop 1.5 mg      stay on  TOUJEO  MAX    50 units at night     humalog UNIT - 200   insulin 8 units before breakfast, 8  units before lunch and 10  units before dinner.      Also, add additional humalog  as follows with meals  If blood sugars are[de-identified]    150-200 mg 1 units    201-250 mg 3 units    251-300 mg 5 units    301-350 mg 7 units    351-400 mg 9 units    401-450 mg 11 units    451-500 mg  13 units     Less than 70 mg NO INSULIN          Do not skip meals  Do not eat in between meals    Reduce carbs- pasta, rice, potatoes, bread   Do not drink juices or sodas  Donot eat peanut butter   Do not eat muffins, biscuits   Do not eat sugar free cookies and cakes   Do not eat peaches, grapes, oranges and pine apples             -------------PAY ATTENTION TO THESE GENERAL INSTRUCTIONS -----------------      - The medications prescribed at this visit will not be available at pharmacy until 6 pm       - YOUR MED LIST IS NOT UP TO DATE AS SOME CHANGES ARE BEING MADE AFTER THE VISIT - FOLLOW SPECIFIC INSTRUCTIONS  ABOVE     -ANY tests other than blood work, which you opt to do  outside the  [facility] imaging facilities, you are responsible for prior authorizations if  required    - [address] UP TO DATE ON YOUR AVS- PLEASE IGNORE     Results     *Normal results will not be notified by a phone call starting January 1 2021   *If you have an upcoming visit, the results will be discussed at the visit   *Please sign up for MY CHART if you want access to your lab and test results  *Abnormal results which require immediate attention will be notified by phone call   *Abnormal results which do not require immediate assistance will be notified in 1-2 weeks Refills    -    have your pharmacy send us a refill request . Refills are done max for one year and a visit is a must before refills are extended    Follow up appointments -  highly encourage you to make it when you are checking out. We can accommodate you into the schedule based on your clinical situation, but not for extending refills beyond a year. Labs are important to give refills and is important to get labs before the visit     Phone calls  -  Allow  24 hrs.  for non-urgent calls to be returned  Prior authorization - It may take 2-4 weeks to process  Forms  -  FMLA, DMV etc., will take up to 2 weeks to process  Cancellations - please notify the office 2 days in advance   Samples  - will only be dispensed at visits       If not showing for the appointments and cancelling appointments within 24 hours are kept track of and three  of such situations in  two consecutive years will likely be considered for termination from the practice    -------------------------------------------------------------------------------------------------------------------

## 2022-05-12 NOTE — PROGRESS NOTES
Rere Gallegos is a 79 y.o. female here for   Chief Complaint   Patient presents with    Diabetes       1. Have you been to the ER, urgent care clinic since your last visit? Hospitalized since your last visit? - no    2. Have you seen or consulted any other health care providers outside of the [address] [place] [address] since your last visit?   Include any pap smears or colon screening.- no

## 2022-05-12 NOTE — PROGRESS NOTES
HISTORY OF PRESENT ILLNESS  Vicente Pena is a 79 y.o. female. HPI  Patient here for f/u after last visit of Type 2 diabetes mellitus from october 2021     LOST 13 lbs   She is doing fine other than weight gain        October 2021   She got cortisone shots in the interim     April 2021   Gained 1 lb   She is doing well   SHE SAYS SHE IS EATING UNDER STRESS       Old history :     Referred : by pcp- dr. Aj Live  H/o diabetes for 12 years   Has seen Dr. Jessica Aldana  Thus far   Current A1C is 11.1 % from March 2014  and symptoms/problems include none   Current diabetic medications include mix 50/50    30 units AM and 35 units PM and tradjenta . Review of Systems   Constitutional: Negative. Psychiatric/Behavioral:yes  depression and memory loss. The patient does not have insomnia. Physical Exam   Constitutional: She is oriented to person, place, and time. She appears well-developed and well-nourished.    Psychiatric: She is saying she is depressed         Diabetic feet exam :    H/o partial or complete amputation of foot : N  H/o previous foot ulceration ; N  H/o pre - ulcerative callus : Y  H/o peripheral neuropathy and callus : Y  H/o poor circulation     SRINIVASA   : N  Foot deformity : Y        Lab Results   Component Value Date/Time    Hemoglobin A1c 6.9 (H) 04/08/2022 12:00 AM    Hemoglobin A1c 7.4 (H) 10/15/2021 09:47 AM    Hemoglobin A1c 6.8 (H) 04/07/2021 08:37 AM    Hemoglobin A1c, External 7.1 12/13/2019 12:00 AM    Hemoglobin A1c, External 8.3 04/17/2019 12:00 AM    Glucose 82 04/08/2022 12:00 AM    Glucose  06/16/2015 04:21 PM    Microalbumin/Creat ratio (mg/g creat) 6 10/15/2021 09:47 AM    Microalb/Creat ratio (ug/mg creat.) 3 04/08/2022 12:00 AM    Microalbumin,urine random 1.17 10/15/2021 09:47 AM    LDL, calculated 106 (H) 04/08/2022 12:00 AM    LDL, calculated 93.8 10/15/2021 09:47 AM    Creatinine 0.84 04/08/2022 12:00 AM      Lab Results   Component Value Date/Time    Cholesterol, total 195 04/08/2022 12:00 AM    HDL Cholesterol 80 04/08/2022 12:00 AM    LDL, calculated 106 (H) 04/08/2022 12:00 AM    LDL, calculated 93.8 10/15/2021 09:47 AM    LDL-C, External 95 12/13/2019 12:00 AM    Triglyceride 47 04/08/2022 12:00 AM    CHOL/HDL Ratio 2.1 10/15/2021 09:47 AM     Lab Results   Component Value Date/Time    ALT (SGPT) 11 04/08/2022 12:00 AM    Alk. phosphatase 104 04/08/2022 12:00 AM    Bilirubin, total 0.6 04/08/2022 12:00 AM    Albumin 4.3 04/08/2022 12:00 AM    Protein, total 6.9 04/08/2022 12:00 AM     Lab Results   Component Value Date/Time    GFR est non-AA >60 10/15/2021 09:47 AM    GFR est AA >60 10/15/2021 09:47 AM    Creatinine 0.84 04/08/2022 12:00 AM    BUN 17 04/08/2022 12:00 AM    Sodium 144 04/08/2022 12:00 AM    Potassium 4.0 04/08/2022 12:00 AM    Chloride 108 (H) 04/08/2022 12:00 AM    CO2 23 04/08/2022 12:00 AM           ASSESSMENT and PLAN    1. Type 2 DM, uncontrolled : A1c is   6.9 %     From April 2022  compared to  7.4 %  From October 2021       6.8 %      From   April 2021    Compared to     6.8 %     From   Sept 2020     Compared to   7.1 %     From      Feb 2020    compared to    7 %     From  August 2019     compared to    8.1 %     From April 2019     comapred to  7.8 %     From     Oct 2018         May 2022         Stay on basal bolus regimen   trulicity to  3  mg a day   Stay on [facility]     She deferred the CGM        Oct 2021   Stay on basal bolus regimen - changed to toujeo max  Increase trulicity to  3  mg a day   Stay on [facility]       April 2021  Stay on basal bolus regimen   She will stay on trulicity   Increase trulicity to  1.5  mg a day   Stay on [facility]           2. Hypoglycemia :  Educated on treating the hypoglycemia. Discussed proper insulin use and prescribed    3. HTN : continue lisinopril. 4.  Dyslipidemia : after the TIA , got onto statin and plavix   She stopped both of them by misunderstanding   Resume crestor 5 mg hs      5.    Diabetic complications :     A. Retinopathy-NO   educated on this complication,  regular f/u with ophthalmologist encouraged    B. Nephropathy - NO       C. Peripheral Neuropathy -   , mild   educated on this disease and indicated improvement with good and stable glycemic control      F. TIA  - sept 2019   : jardiance is not covered , so farxiga ordered    she is euthyroid jan 2020       8. Obesity  : Body mass index is 50.74 kg/m².    On  zoloft  To  100 mg a day           Did the comprehensive foot exam today   I am treating the patient [facility] comprehensive plan of care for diabetes   The patient would benefit from diabetic foot wear to protect their feet       Reviewed results with patient and discussed the labs being ordered today/bnv  Patient voiced understanding of plan of care

## 2022-10-10 LAB
CREATININE, EXTERNAL: 0.86
LDL-C, EXTERNAL: 128

## 2022-11-15 ENCOUNTER — OFFICE VISIT (OUTPATIENT)
Dept: ENDOCRINOLOGY | Age: 67
End: 2022-11-15
Payer: MEDICARE

## 2022-11-15 VITALS
SYSTOLIC BLOOD PRESSURE: 132 MMHG | HEIGHT: 64 IN | OXYGEN SATURATION: 95 % | WEIGHT: 284 LBS | RESPIRATION RATE: 20 BRPM | DIASTOLIC BLOOD PRESSURE: 58 MMHG | BODY MASS INDEX: 48.49 KG/M2 | HEART RATE: 73 BPM | TEMPERATURE: 98.1 F

## 2022-11-15 DIAGNOSIS — E78.2 MIXED HYPERLIPIDEMIA: ICD-10-CM

## 2022-11-15 DIAGNOSIS — E11.65 UNCONTROLLED TYPE 2 DIABETES MELLITUS WITH HYPERGLYCEMIA (HCC): Primary | ICD-10-CM

## 2022-11-15 DIAGNOSIS — I10 ESSENTIAL HYPERTENSION: ICD-10-CM

## 2022-11-15 DIAGNOSIS — E55.9 VITAMIN D DEFICIENCY: ICD-10-CM

## 2022-11-15 PROCEDURE — 3074F SYST BP LT 130 MM HG: CPT | Performed by: INTERNAL MEDICINE

## 2022-11-15 PROCEDURE — 1101F PT FALLS ASSESS-DOCD LE1/YR: CPT | Performed by: INTERNAL MEDICINE

## 2022-11-15 PROCEDURE — G8536 NO DOC ELDER MAL SCRN: HCPCS | Performed by: INTERNAL MEDICINE

## 2022-11-15 PROCEDURE — 3044F HG A1C LEVEL LT 7.0%: CPT | Performed by: INTERNAL MEDICINE

## 2022-11-15 PROCEDURE — G8400 PT W/DXA NO RESULTS DOC: HCPCS | Performed by: INTERNAL MEDICINE

## 2022-11-15 PROCEDURE — 99214 OFFICE O/P EST MOD 30 MIN: CPT | Performed by: INTERNAL MEDICINE

## 2022-11-15 PROCEDURE — 2022F DILAT RTA XM EVC RTNOPTHY: CPT | Performed by: INTERNAL MEDICINE

## 2022-11-15 PROCEDURE — G8754 DIAS BP LESS 90: HCPCS | Performed by: INTERNAL MEDICINE

## 2022-11-15 PROCEDURE — G8417 CALC BMI ABV UP PARAM F/U: HCPCS | Performed by: INTERNAL MEDICINE

## 2022-11-15 PROCEDURE — G8752 SYS BP LESS 140: HCPCS | Performed by: INTERNAL MEDICINE

## 2022-11-15 PROCEDURE — 3017F COLORECTAL CA SCREEN DOC REV: CPT | Performed by: INTERNAL MEDICINE

## 2022-11-15 PROCEDURE — 3078F DIAST BP <80 MM HG: CPT | Performed by: INTERNAL MEDICINE

## 2022-11-15 PROCEDURE — G8510 SCR DEP NEG, NO PLAN REQD: HCPCS | Performed by: INTERNAL MEDICINE

## 2022-11-15 PROCEDURE — G8427 DOCREV CUR MEDS BY ELIG CLIN: HCPCS | Performed by: INTERNAL MEDICINE

## 2022-11-15 PROCEDURE — 1090F PRES/ABSN URINE INCON ASSESS: CPT | Performed by: INTERNAL MEDICINE

## 2022-11-15 PROCEDURE — 1123F ACP DISCUSS/DSCN MKR DOCD: CPT | Performed by: INTERNAL MEDICINE

## 2022-11-15 RX ORDER — CYCLOBENZAPRINE HCL 10 MG
TABLET ORAL
COMMUNITY
Start: 2022-08-09

## 2022-11-15 RX ORDER — INSULIN LISPRO 200 [IU]/ML
INJECTION, SOLUTION SUBCUTANEOUS
Qty: 15 ML | Refills: 6 | Status: SHIPPED | OUTPATIENT
Start: 2022-11-15

## 2022-11-15 RX ORDER — LISINOPRIL 5 MG/1
5 TABLET ORAL DAILY
COMMUNITY
Start: 2022-10-27

## 2022-11-15 RX ORDER — ROSUVASTATIN CALCIUM 5 MG/1
TABLET, COATED ORAL
Qty: 90 TABLET | Refills: 1 | Status: SHIPPED | OUTPATIENT
Start: 2022-11-15

## 2022-11-15 RX ORDER — SERTRALINE HYDROCHLORIDE 100 MG/1
100 TABLET, FILM COATED ORAL DAILY
Qty: 30 TABLET | Refills: 5 | Status: SHIPPED | OUTPATIENT
Start: 2022-11-15

## 2022-11-15 RX ORDER — POTASSIUM CHLORIDE 750 MG/1
TABLET, FILM COATED, EXTENDED RELEASE ORAL
COMMUNITY
Start: 2022-11-06

## 2022-11-15 RX ORDER — DULAGLUTIDE 3 MG/.5ML
3 INJECTION, SOLUTION SUBCUTANEOUS
Qty: 4 EACH | Refills: 6 | Status: SHIPPED | OUTPATIENT
Start: 2022-11-15

## 2022-11-15 NOTE — PROGRESS NOTES
Eleni Curiel is a 79 y.o. female here for   Chief Complaint   Patient presents with    Diabetes       1. Have you been to the ER, urgent care clinic since your last visit? Hospitalized since your last visit? -[facility] 6 mo or less for passing out, N/V and diarrhea     2. Have you seen or consulted any other health care providers outside of the [address] [place] [address] since your last visit?   Include any pap smears or colon screening.-no

## 2022-11-15 NOTE — LETTER
11/15/2022    Patient: Usman Mays   YOB: 1955   Date of Visit: 11/15/2022     Jaci Cheatham MD  [address] [place] [address]  Via Fax: [phone]    Dear Jaci Cheatham MD,      Thank you for referring Ms. Usman Mays to [address] [place] [address] for evaluation. My notes for this consultation are attached. If you have questions, please do not hesitate to call me. I look forward to following your patient along with you.       Sincerely,    Ting Vidales MD

## 2022-11-15 NOTE — PROGRESS NOTES
HISTORY OF PRESENT ILLNESS  Antonio Hicks is a 79 y.o. female. HPI  Patient here for f/u after last visit of Type 2 diabetes mellitus from May 2022     LOST 11 lbs   Had labs at pcp   Pcp gave her samples of meds - farxiga  and trulicity to last until this  visit         May 2022     LOST 13 lbs   She is doing fine other than weight gain    Old history :     Referred : by pcp- dr. Aditi Berger  H/o diabetes for 12 years   Has seen Dr. Chastity Singleton  Thus far   Current A1C is 11.1 % from March 2014  and symptoms/problems include none   Current diabetic medications include mix 50/50    30 units AM and 35 units PM and tradjenta . Review of Systems   Constitutional: Negative. Psychiatric/Behavioral:yes  depression and memory loss. The patient does not have insomnia. Physical Exam   Constitutional: She is oriented to person, place, and time. She appears well-developed and well-nourished. Psychiatric: She is saying she is depressed       Reviewed labs from pcp October 2022       ASSESSMENT and PLAN    1.  Type 2 DM, uncontrolled : A1c is       compared to    6.9 %     From April 2022  compared to  7.4 %  From October 2021       6.8 %      From   April 2021    Compared to     6.8 %     From   Sept 2020     Compared to   7.1 %     From      Feb 2020    compared to    7 %     From  August 2019     compared to    8.1 %     From April 2019     comapred to  7.8 %     From     Oct 2018         Nov 2022   Noted no a1c on pcp labs   Pt says she has high sugars, but no meter   She agrees to not checking sugars   Stay on basal bolus regimen ( doubt she is doing it correctly )      On medicare- it is quite understandable about the copays and donut holes   Between both the  meds, she can take  trulicity to  3  mg a day ( buy it if she can )   [address] because of cost reasons     I am encouraging  her to use CGM    Antonio Hicks   is being seen for DM type 2   Patient  performs 4 times of blood glucose checks a day utilizing the home BGM. Patient  uses  subcutaneous 4  Insulin  shots   to treat  diabetes. Patient does adjustments to the regimen by sliding scale or bolus wizard  on the basis of therapeutic CGM testing results              May 2022         Stay on basal bolus regimen    trulicity to  3  mg a day  Stay on [facility]   She deferred the CGM        Oct 2021   Stay on basal bolus regimen - changed to toujeo max  Increase trulicity to  3  mg a day   Stay on [facility]       April 2021  Stay on basal bolus regimen   She will stay on trulicity   Increase trulicity to  1.5  mg a day   Stay on [facility]           2. Hypoglycemia :  Educated on treating the hypoglycemia. Discussed proper insulin use and prescribed    3. HTN : continue lisinopril. 4.  Dyslipidemia : after the TIA , got onto statin and plavix   Lipids are off  - compliance emphasized       5. Diabetic complications :     A. Retinopathy-NO   educated on this complication,  regular f/u with ophthalmologist encouraged    B. Nephropathy - NO       C. Peripheral Neuropathy -   , mild   educated on this disease and indicated improvement with good and stable glycemic control      F. TIA  - sept 2019   : optional  farxiga for cost reasons   BP  and  HPL to be in control          8. Obesity  : Body mass index is 48.75 kg/m². On  zoloft  To  100 mg a day       9.  Vit d DEF - taking vit d helps a lot         Reviewed results with patient and discussed the labs being ordered today/bnv  Patient voiced understanding of plan of care

## 2022-11-15 NOTE — PATIENT INSTRUCTIONS
SPECIFIC INSTRUCTIONS BELOW       creestor 5 mg at night     ZOLOFT   100 MG  A DAY     FARXIGA 10 MG    Before b-fast   - ( optional  by cost )       Check blood sugars immediately before each meal and at bedtime ( print and mail )    Optional    trulicity  3 mg once a week  because of cost      stay on  [facility]  MAX    50 units at night     humalog UNIT - 200   insulin 8 units before breakfast, 8  units before lunch and 10  units before dinner.      Also, add additional humalog  as follows with meals  If blood sugars are[de-identified]    150-200 mg 1 units    201-250 mg 3 units    251-300 mg 5 units    301-350 mg 7 units    351-400 mg 9 units    401-450 mg 11 units    451-500 mg  13 units     Less than 70 mg NO INSULIN          Do not skip meals  Do not eat in between meals    Reduce carbs- pasta, rice, potatoes, bread   Do not drink juices or sodas  Donot eat peanut butter   Do not eat muffins, biscuits   Do not eat sugar free cookies and cakes   Do not eat peaches, grapes, oranges and pine apples             -------------PAY ATTENTION TO THESE GENERAL INSTRUCTIONS -----------------      - The medications prescribed at this visit will not be available at pharmacy until 6 pm       - YOUR MED LIST IS NOT UP TO DATE AS SOME CHANGES ARE BEING MADE AFTER THE VISIT - FOLLOW SPECIFIC INSTRUCTIONS  ABOVE     -ANY tests other than blood work, which you opt to do  outside the  [facility] imaging facilities, you are responsible for prior authorizations if  required    - 18 Sonya [address] UP TO DATE ON YOUR AVS- PLEASE IGNORE     Results     *Normal results will not be notified by a phone call starting January 1 2021   *If you have an upcoming visit, the results will be discussed at the visit   *Please sign up for MY CHART if you want access to your lab and test results  *Abnormal results which require immediate attention will be notified by phone call   *Abnormal results which do not require immediate assistance will be notified in 1-2 weeks       Refills    -    have your pharmacy send us a refill request . Refills are done max for one year and a visit is a must before refills are extended    Follow up appointments -  highly encourage you to make it when you are checking out. We can accommodate you into the schedule based on your clinical situation, but not for extending refills beyond a year. Labs are important to give refills and is important to get labs before the visit     Phone calls  -  Allow  24 hrs.  for non-urgent calls to be returned  Prior authorization - It may take 2-4 weeks to process  Forms  -  FMLA, DMV etc., will take up to 2 weeks to process  Cancellations - please notify the office 2 days in advance   Samples  - will only be dispensed at visits       If not showing for the appointments and cancelling appointments within 24 hours are kept track of and three  of such situations in  two consecutive years will likely be considered for termination from the practice    -------------------------------------------------------------------------------------------------------------------

## 2022-11-23 ENCOUNTER — TELEPHONE (OUTPATIENT)
Dept: ENDOCRINOLOGY | Age: 67
End: 2022-11-23

## 2022-11-23 NOTE — TELEPHONE ENCOUNTER
She has to use regular insulin instead of humalog -   No oral meds to cover trulicity     If the gap  happens, then she has to stay off expensive meds   [employer] brand insulins for both long and shot acting are the cheaper meds     Macey Patton MD

## 2022-11-23 NOTE — TELEPHONE ENCOUNTER
Patient returned call. She states that she would definitely like alternatives to both as she can not afford either.

## 2022-11-23 NOTE — TELEPHONE ENCOUNTER
Insurance company is requesting if provider had any alternatives for patient while she is @ her rx gap and Humalog and Trulicity are over $943.05 even if temporary oral alternative while waiting for coverage to start over. Rep asked for nurse to reach out to patient either way to inform of review and decisions.      No number to call back with mediblue stating this is courtesy call for patient

## 2022-12-19 DIAGNOSIS — E11.65 UNCONTROLLED TYPE 2 DIABETES MELLITUS WITH HYPERGLYCEMIA (HCC): ICD-10-CM

## 2022-12-19 DIAGNOSIS — E78.2 MIXED HYPERLIPIDEMIA: ICD-10-CM

## 2022-12-20 DIAGNOSIS — E78.2 MIXED HYPERLIPIDEMIA: ICD-10-CM

## 2022-12-20 DIAGNOSIS — E11.65 UNCONTROLLED TYPE 2 DIABETES MELLITUS WITH HYPERGLYCEMIA (HCC): ICD-10-CM

## 2022-12-20 RX ORDER — ROSUVASTATIN CALCIUM 5 MG/1
TABLET, COATED ORAL
Qty: 90 TABLET | Refills: 1 | Status: SHIPPED | OUTPATIENT
Start: 2022-12-20

## 2023-02-27 NOTE — TELEPHONE ENCOUNTER
Patient is trying to get status of medication Xultophy. She needs her medication. [Well Developed] : well developed [Well Nourished] : well nourished [No Acute Distress] : no acute distress [Normal Conjunctiva] : normal conjunctiva [Normal Venous Pressure] : normal venous pressure [No Carotid Bruit] : no carotid bruit [Normal S1, S2] : normal S1, S2 [No Murmur] : no murmur [No Rub] : no rub [No Gallop] : no gallop [Clear Lung Fields] : clear lung fields [Good Air Entry] : good air entry [No Respiratory Distress] : no respiratory distress  [Soft] : abdomen soft [Non Tender] : non-tender [No Masses/organomegaly] : no masses/organomegaly [Normal Bowel Sounds] : normal bowel sounds [Normal Gait] : normal gait [No Edema] : no edema [No Cyanosis] : no cyanosis [No Clubbing] : no clubbing [No Varicosities] : no varicosities [No Rash] : no rash [No Skin Lesions] : no skin lesions [Moves all extremities] : moves all extremities [No Focal Deficits] : no focal deficits [Normal Speech] : normal speech [Alert and Oriented] : alert and oriented [Normal memory] : normal memory [Normal] : normal S1, S2, no murmur, no rub, no gallop

## 2023-03-14 LAB — HBA1C MFR BLD HPLC: 7.3 %

## 2023-03-18 DIAGNOSIS — E11.65 UNCONTROLLED TYPE 2 DIABETES MELLITUS WITH HYPERGLYCEMIA (HCC): ICD-10-CM

## 2023-03-18 DIAGNOSIS — E78.2 MIXED HYPERLIPIDEMIA: ICD-10-CM

## 2023-03-20 RX ORDER — ROSUVASTATIN CALCIUM 5 MG/1
TABLET, COATED ORAL
Qty: 90 TABLET | Refills: 1 | Status: SHIPPED | OUTPATIENT
Start: 2023-03-20

## 2023-04-22 RX ORDER — PEN NEEDLE, DIABETIC 32GX 5/32"
NEEDLE, DISPOSABLE MISCELLANEOUS
Qty: 200 PEN NEEDLE | Refills: 3 | Status: SHIPPED | OUTPATIENT
Start: 2023-04-22

## 2023-05-01 DIAGNOSIS — E11.65 TYPE 2 DIABETES MELLITUS WITH HYPERGLYCEMIA, WITH LONG-TERM CURRENT USE OF INSULIN (HCC): Primary | ICD-10-CM

## 2023-05-01 DIAGNOSIS — Z79.4 TYPE 2 DIABETES MELLITUS WITH HYPERGLYCEMIA, WITH LONG-TERM CURRENT USE OF INSULIN (HCC): Primary | ICD-10-CM

## 2023-05-01 DIAGNOSIS — E78.2 MIXED HYPERLIPIDEMIA: ICD-10-CM

## 2023-05-01 DIAGNOSIS — I10 ESSENTIAL (PRIMARY) HYPERTENSION: ICD-10-CM

## 2023-05-16 DIAGNOSIS — Z79.4 TYPE 2 DIABETES MELLITUS WITH HYPERGLYCEMIA, WITH LONG-TERM CURRENT USE OF INSULIN (HCC): Primary | ICD-10-CM

## 2023-05-16 DIAGNOSIS — E11.65 TYPE 2 DIABETES MELLITUS WITH HYPERGLYCEMIA, WITH LONG-TERM CURRENT USE OF INSULIN (HCC): Primary | ICD-10-CM

## 2023-05-16 RX ORDER — DULAGLUTIDE 3 MG/.5ML
INJECTION, SOLUTION SUBCUTANEOUS
Qty: 6 ML | Refills: 3 | Status: SHIPPED | OUTPATIENT
Start: 2023-05-16

## 2023-05-17 ENCOUNTER — OFFICE VISIT (OUTPATIENT)
Age: 68
End: 2023-05-17
Payer: MEDICARE

## 2023-05-17 VITALS
WEIGHT: 284.6 LBS | BODY MASS INDEX: 48.59 KG/M2 | SYSTOLIC BLOOD PRESSURE: 136 MMHG | HEART RATE: 71 BPM | HEIGHT: 64 IN | RESPIRATION RATE: 18 BRPM | TEMPERATURE: 97.8 F | OXYGEN SATURATION: 93 % | DIASTOLIC BLOOD PRESSURE: 60 MMHG

## 2023-05-17 DIAGNOSIS — Z79.4 TYPE 2 DIABETES MELLITUS WITH HYPERGLYCEMIA, WITH LONG-TERM CURRENT USE OF INSULIN (HCC): Primary | ICD-10-CM

## 2023-05-17 DIAGNOSIS — E78.2 MIXED HYPERLIPIDEMIA: ICD-10-CM

## 2023-05-17 DIAGNOSIS — I10 ESSENTIAL (PRIMARY) HYPERTENSION: ICD-10-CM

## 2023-05-17 DIAGNOSIS — E55.9 VITAMIN D DEFICIENCY, UNSPECIFIED: ICD-10-CM

## 2023-05-17 DIAGNOSIS — E11.65 TYPE 2 DIABETES MELLITUS WITH HYPERGLYCEMIA, WITH LONG-TERM CURRENT USE OF INSULIN (HCC): Primary | ICD-10-CM

## 2023-05-17 PROCEDURE — 2022F DILAT RTA XM EVC RTNOPTHY: CPT | Performed by: INTERNAL MEDICINE

## 2023-05-17 PROCEDURE — 99214 OFFICE O/P EST MOD 30 MIN: CPT | Performed by: INTERNAL MEDICINE

## 2023-05-17 PROCEDURE — 3074F SYST BP LT 130 MM HG: CPT | Performed by: INTERNAL MEDICINE

## 2023-05-17 PROCEDURE — 3078F DIAST BP <80 MM HG: CPT | Performed by: INTERNAL MEDICINE

## 2023-05-17 PROCEDURE — G8400 PT W/DXA NO RESULTS DOC: HCPCS | Performed by: INTERNAL MEDICINE

## 2023-05-17 PROCEDURE — 3017F COLORECTAL CA SCREEN DOC REV: CPT | Performed by: INTERNAL MEDICINE

## 2023-05-17 PROCEDURE — 4004F PT TOBACCO SCREEN RCVD TLK: CPT | Performed by: INTERNAL MEDICINE

## 2023-05-17 PROCEDURE — 3051F HG A1C>EQUAL 7.0%<8.0%: CPT | Performed by: INTERNAL MEDICINE

## 2023-05-17 PROCEDURE — G8417 CALC BMI ABV UP PARAM F/U: HCPCS | Performed by: INTERNAL MEDICINE

## 2023-05-17 PROCEDURE — 1123F ACP DISCUSS/DSCN MKR DOCD: CPT | Performed by: INTERNAL MEDICINE

## 2023-05-17 PROCEDURE — 1090F PRES/ABSN URINE INCON ASSESS: CPT | Performed by: INTERNAL MEDICINE

## 2023-05-17 PROCEDURE — G8427 DOCREV CUR MEDS BY ELIG CLIN: HCPCS | Performed by: INTERNAL MEDICINE

## 2023-05-17 RX ORDER — ROSUVASTATIN CALCIUM 5 MG/1
5 TABLET, COATED ORAL EVERY EVENING
Qty: 90 TABLET | Refills: 3
Start: 2023-05-17

## 2023-05-17 RX ORDER — CLOTRIMAZOLE AND BETAMETHASONE DIPROPIONATE 10; .64 MG/G; MG/G
CREAM TOPICAL
COMMUNITY
Start: 2023-04-20

## 2023-05-17 RX ORDER — INSULIN GLARGINE 300 U/ML
INJECTION, SOLUTION SUBCUTANEOUS
Qty: 45 ML | Refills: 3
Start: 2023-05-17

## 2023-05-17 RX ORDER — SERTRALINE HYDROCHLORIDE 100 MG/1
100 TABLET, FILM COATED ORAL DAILY
COMMUNITY

## 2023-05-17 RX ORDER — CLINDAMYCIN HYDROCHLORIDE 150 MG/1
CAPSULE ORAL
COMMUNITY
Start: 2023-04-29

## 2023-05-17 RX ORDER — FUROSEMIDE 20 MG/1
20 TABLET ORAL DAILY
COMMUNITY
Start: 2023-03-21

## 2023-05-17 ASSESSMENT — PATIENT HEALTH QUESTIONNAIRE - PHQ9
1. LITTLE INTEREST OR PLEASURE IN DOING THINGS: 0
SUM OF ALL RESPONSES TO PHQ9 QUESTIONS 1 & 2: 0
SUM OF ALL RESPONSES TO PHQ QUESTIONS 1-9: 0
2. FEELING DOWN, DEPRESSED OR HOPELESS: 0
SUM OF ALL RESPONSES TO PHQ QUESTIONS 1-9: 0

## 2023-05-17 NOTE — PATIENT INSTRUCTIONS
SPECIFIC INSTRUCTIONS BELOW       DRINK water   Do not skip meals  Do not eat in between meals    Reduce carbs- pasta, rice, potatoes, bread   Try to avoid processed bread products like BISCUITS, CROISSANTS, MUFFINS    Do not drink juices or sodas, even if they are calorie zero or diet drinks and especially avoid using powders like crystalloids , ELIGIO-AIDS     Do not eat peanut butter     Do not eat sugar free cookies and cakes   Do not eat peaches, oranges, pineapples, raisins, grapes , canteloupe , honey dew, mangoes , watermelon  and fruit medleys    ----------------------------------------------------------------------------------------    Follow the steps  to get started on  DEXCOM / J Carlos Sprinkles 2  for Medicare patients       Keep a log of blood sugars by checking 4 times a day    you must be on insulin shots at least 3 times a day   3. Contact one of the following  DME suppliers  and find out who accepts your plan   4. Give that supplier  our  office info  and fax number [phone] supplies  is best  for  freestyle AUST[facility] 2 - [phone] [facility] supplies   [phone]      [address] [facility] )      [employer]   [phone]         ---------------------------------------------------------------------------------------------------------------------------------------------          crestor 5 mg at night      ZOLOFT   100 MG  A DAY      FARXIGA 10 MG    Before b-fast   - ( optional  by cost )         Check blood sugars immediately before each meal and at bedtime ( print and mail )     Optional    trulicity  3 mg once a week  because of cost       stay on  [facility]  MAX    50 units at night      Novolog    insulin 8 units before breakfast, 8  units before lunch and 10  units before dinner.       Also, add additional novolog  as follows with meals  If blood sugars are[de-identified]     150-200 mg 1 units     201-250 mg 3 units

## 2023-05-17 NOTE — PROGRESS NOTES
1. Have you been to the ER, urgent care clinic since your last visit? Hospitalized since your last visit? No    2. Have you seen or consulted any other health care providers outside of the [address] [place] [address] since your last visit? Include any pap smears or colon screening.  No

## 2023-05-17 NOTE — PROGRESS NOTES
Denita Salazar MD FACE     HISTORY OF PRESENT ILLNESS  Иван Malhotra is a 79 y.o. female. HPI  Patient here for f/u after last visit of Type 2 diabetes mellitus from November 2022     Visited Gyn for vaginal infections   She has been taking farxiga   The prices are up on her new plan . It is true for trulicity         November 2022     LOST 11 lbs   Had labs at [facility] gave her samples of meds - farxiga  and trulicity to last until this  visit         May 2022     LOST 13 lbs   She is doing fine other than weight gain    Old history :     Referred : by pcp- dr. Delfina Norris  H/o diabetes for 12 years   Has seen Dr. Addis Faustin  Thus far   Current A1C is 11.1 % from March 2014  and symptoms/problems include none   Current diabetic medications include mix 50/50    30 units AM and 35 units PM and tradjenta . Review of Systems   Constitutional: Negative. Psychiatric/Behavioral:yes  depression and memory loss. The patient does not have insomnia. Physical Exam   Constitutional: She is oriented to person, place, and time. She appears well-developed and well-nourished.    Psychiatric: She is saying she is depressed       Labs  Diabetes    Lab Results   Component Value Date    LABA1C 7.0 (H) 05/10/2023    LABA1C 6.9 (H) 04/08/2022    LABA1C 7.4 (H) 10/15/2021       Lab Results   Component Value Date     05/10/2023    K 3.8 05/10/2023     (H) 05/10/2023    CO2 27 05/10/2023    BUN 19 05/10/2023    CREATININE 0.81 05/10/2023    GLUCOSE 99 05/10/2023    CALCIUM 8.5 05/10/2023    PROT 7.0 05/10/2023    LABALBU 3.4 (L) 05/10/2023    BILITOT 0.4 05/10/2023    ALKPHOS 101 05/10/2023    AST 19 05/10/2023    ALT 23 05/10/2023    LABGLOM >60 05/10/2023    GFRAA >60 10/15/2021    AGRATIO 1.7 04/08/2022    GLOB 3.6 05/10/2023    CHOL 155 05/10/2023    TRIG 38 05/10/2023    LDLCALC 61.4 05/10/2023    HDL 86 05/10/2023      Thyroid    Lab Results   Component Value

## 2023-06-02 ENCOUNTER — TELEPHONE (OUTPATIENT)
Age: 68
End: 2023-06-02

## 2023-10-12 RX ORDER — INSULIN GLARGINE 300 U/ML
INJECTION, SOLUTION SUBCUTANEOUS
Qty: 15 ML | Refills: 5 | Status: SHIPPED | OUTPATIENT
Start: 2023-10-12

## 2023-11-09 ENCOUNTER — NURSE ONLY (OUTPATIENT)
Age: 68
End: 2023-11-09

## 2023-11-09 DIAGNOSIS — I10 ESSENTIAL (PRIMARY) HYPERTENSION: ICD-10-CM

## 2023-11-09 DIAGNOSIS — E55.9 VITAMIN D DEFICIENCY, UNSPECIFIED: ICD-10-CM

## 2023-11-09 DIAGNOSIS — E11.65 TYPE 2 DIABETES MELLITUS WITH HYPERGLYCEMIA, WITH LONG-TERM CURRENT USE OF INSULIN (HCC): ICD-10-CM

## 2023-11-09 DIAGNOSIS — E78.2 MIXED HYPERLIPIDEMIA: ICD-10-CM

## 2023-11-09 DIAGNOSIS — Z79.4 TYPE 2 DIABETES MELLITUS WITH HYPERGLYCEMIA, WITH LONG-TERM CURRENT USE OF INSULIN (HCC): ICD-10-CM

## 2023-11-10 LAB
ALBUMIN SERPL-MCNC: 3.6 G/DL (ref 3.5–5)
ALBUMIN/GLOB SERPL: 1.1 (ref 1.1–2.2)
ALP SERPL-CCNC: 108 U/L (ref 45–117)
ALT SERPL-CCNC: 22 U/L (ref 12–78)
ANION GAP SERPL CALC-SCNC: 4 MMOL/L (ref 5–15)
AST SERPL-CCNC: 11 U/L (ref 15–37)
BILIRUB SERPL-MCNC: 0.6 MG/DL (ref 0.2–1)
BUN SERPL-MCNC: 15 MG/DL (ref 6–20)
BUN/CREAT SERPL: 18 (ref 12–20)
CALCIUM SERPL-MCNC: 8.8 MG/DL (ref 8.5–10.1)
CHLORIDE SERPL-SCNC: 106 MMOL/L (ref 97–108)
CHOLEST SERPL-MCNC: 193 MG/DL
CO2 SERPL-SCNC: 29 MMOL/L (ref 21–32)
CREAT SERPL-MCNC: 0.83 MG/DL (ref 0.55–1.02)
CREAT UR-MCNC: 201 MG/DL
EST. AVERAGE GLUCOSE BLD GHB EST-MCNC: 166 MG/DL
GLOBULIN SER CALC-MCNC: 3.4 G/DL (ref 2–4)
GLUCOSE SERPL-MCNC: 213 MG/DL (ref 65–100)
HBA1C MFR BLD: 7.4 % (ref 4–5.6)
HDLC SERPL-MCNC: 104 MG/DL
HDLC SERPL: 1.9 (ref 0–5)
LDLC SERPL CALC-MCNC: 74.2 MG/DL (ref 0–100)
MICROALBUMIN UR-MCNC: 1.35 MG/DL
MICROALBUMIN/CREAT UR-RTO: 7 MG/G (ref 0–30)
POTASSIUM SERPL-SCNC: 4 MMOL/L (ref 3.5–5.1)
PROT SERPL-MCNC: 7 G/DL (ref 6.4–8.2)
SODIUM SERPL-SCNC: 139 MMOL/L (ref 136–145)
TRIGL SERPL-MCNC: 74 MG/DL
TSH SERPL DL<=0.05 MIU/L-ACNC: 1.5 UIU/ML (ref 0.36–3.74)
VLDLC SERPL CALC-MCNC: 14.8 MG/DL

## 2023-11-15 ENCOUNTER — OFFICE VISIT (OUTPATIENT)
Age: 68
End: 2023-11-15
Payer: MEDICARE

## 2023-11-15 VITALS
HEART RATE: 71 BPM | OXYGEN SATURATION: 95 % | DIASTOLIC BLOOD PRESSURE: 60 MMHG | SYSTOLIC BLOOD PRESSURE: 141 MMHG | BODY MASS INDEX: 49.75 KG/M2 | HEIGHT: 64 IN | TEMPERATURE: 97.6 F | WEIGHT: 291.4 LBS | RESPIRATION RATE: 18 BRPM

## 2023-11-15 DIAGNOSIS — E11.65 TYPE 2 DIABETES MELLITUS WITH HYPERGLYCEMIA, WITH LONG-TERM CURRENT USE OF INSULIN (HCC): Primary | ICD-10-CM

## 2023-11-15 DIAGNOSIS — I10 ESSENTIAL (PRIMARY) HYPERTENSION: ICD-10-CM

## 2023-11-15 DIAGNOSIS — Z79.4 TYPE 2 DIABETES MELLITUS WITH HYPERGLYCEMIA, WITH LONG-TERM CURRENT USE OF INSULIN (HCC): Primary | ICD-10-CM

## 2023-11-15 DIAGNOSIS — E78.2 MIXED HYPERLIPIDEMIA: ICD-10-CM

## 2023-11-15 PROCEDURE — 2022F DILAT RTA XM EVC RTNOPTHY: CPT | Performed by: INTERNAL MEDICINE

## 2023-11-15 PROCEDURE — 3017F COLORECTAL CA SCREEN DOC REV: CPT | Performed by: INTERNAL MEDICINE

## 2023-11-15 PROCEDURE — G8484 FLU IMMUNIZE NO ADMIN: HCPCS | Performed by: INTERNAL MEDICINE

## 2023-11-15 PROCEDURE — G8427 DOCREV CUR MEDS BY ELIG CLIN: HCPCS | Performed by: INTERNAL MEDICINE

## 2023-11-15 PROCEDURE — 3077F SYST BP >= 140 MM HG: CPT | Performed by: INTERNAL MEDICINE

## 2023-11-15 PROCEDURE — 1036F TOBACCO NON-USER: CPT | Performed by: INTERNAL MEDICINE

## 2023-11-15 PROCEDURE — 1123F ACP DISCUSS/DSCN MKR DOCD: CPT | Performed by: INTERNAL MEDICINE

## 2023-11-15 PROCEDURE — 3051F HG A1C>EQUAL 7.0%<8.0%: CPT | Performed by: INTERNAL MEDICINE

## 2023-11-15 PROCEDURE — 99214 OFFICE O/P EST MOD 30 MIN: CPT | Performed by: INTERNAL MEDICINE

## 2023-11-15 PROCEDURE — 1090F PRES/ABSN URINE INCON ASSESS: CPT | Performed by: INTERNAL MEDICINE

## 2023-11-15 PROCEDURE — G8417 CALC BMI ABV UP PARAM F/U: HCPCS | Performed by: INTERNAL MEDICINE

## 2023-11-15 PROCEDURE — 3078F DIAST BP <80 MM HG: CPT | Performed by: INTERNAL MEDICINE

## 2023-11-15 PROCEDURE — G8400 PT W/DXA NO RESULTS DOC: HCPCS | Performed by: INTERNAL MEDICINE

## 2023-11-15 RX ORDER — UPADACITINIB 15 MG/1
TABLET, EXTENDED RELEASE ORAL DAILY
COMMUNITY

## 2023-11-15 RX ORDER — DULAGLUTIDE 3 MG/.5ML
INJECTION, SOLUTION SUBCUTANEOUS
Qty: 6 ML | Refills: 3
Start: 2023-11-15

## 2023-11-15 RX ORDER — PHENTERMINE AND TOPIRAMATE 7.5; 46 MG/1; MG/1
CAPSULE, EXTENDED RELEASE ORAL
Qty: 30 CAPSULE | Refills: 3 | Status: SHIPPED | OUTPATIENT
Start: 2023-11-15 | End: 2024-02-14

## 2023-11-15 NOTE — PROGRESS NOTES
Artur Ahuja is a 76 y.o. female here for   Chief Complaint   Patient presents with    Diabetes       1. Have you been to the ER, urgent care clinic since your last visit? Hospitalized since your last visit? - no    2. Have you seen or consulted any other health care providers outside of the [address] [place] [address] since your last visit?   Include any pap smears or colon screening.- no

## 2023-11-15 NOTE — PROGRESS NOTES
Elidia Rosa MD FACE     HISTORY OF PRESENT ILLNESS  Rachael Mtz is a 76  y.o. female. HPI  Patient here for f/u after last visit of Type 2 diabetes mellitus from May 2023     Gained 7 lbs   Trulicity is pricey      May 2023     Visited Gyn for vaginal infections   She has been taking farxiga   The prices are up on her new plan . It is true for trulicity       November 2022     LOST 11 lbs   Had labs at [address] [place] [address] gave her samples of meds - farxiga  and trulicity to last until this  visit         Old history :     Referred : by pcp- dr. Bryson Smith  H/o diabetes for 12 years   Has seen Dr. An Knutson  Thus far   Current A1C is 11.1 % from March 2014  and symptoms/problems include none   Current diabetic medications include mix 50/50    30 units AM and 35 units PM and tradjenta . Review of Systems   Constitutional: Negative. Psychiatric/Behavioral:yes  depression and memory loss. The patient does not have insomnia. Physical Exam   Constitutional: She is oriented to person, place, and time. She appears well-developed and well-nourished.    Psychiatric: She is saying she is depressed     Labs  Lab Results   Component Value Date    LABA1C 7.4 (H) 11/09/2023    LABA1C 7.0 (H) 05/10/2023    LABA1C 6.9 (H) 04/08/2022       Lab Results   Component Value Date     11/09/2023    K 4.0 11/09/2023     11/09/2023    CO2 29 11/09/2023    BUN 15 11/09/2023    CREATININE 0.83 11/09/2023    GLUCOSE 213 (H) 11/09/2023    CALCIUM 8.8 11/09/2023    PROT 7.0 11/09/2023    LABALBU 3.6 11/09/2023    BILITOT 0.6 11/09/2023    ALKPHOS 108 11/09/2023    AST 11 (L) 11/09/2023    ALT 22 11/09/2023    LABGLOM >60 11/09/2023    GFRAA >60 10/15/2021    AGRATIO 1.7 04/08/2022    GLOB 3.4 11/09/2023    CHOL 193 11/09/2023    TRIG 74 11/09/2023    LDLCALC 74.2 11/09/2023     11/09/2023      Thyroid    Lab Results   Component Value Date    VQP0CMQ 1.50 11/09/2023

## 2023-11-15 NOTE — PATIENT INSTRUCTIONS
SPECIFIC INSTRUCTIONS BELOW     Qsymia 7.5 - 46 mg   a day     ------------------------------------------------------------------------------------------------    DRINK water   Do not skip meals  Do not eat in between meals    Reduce carbs- pasta, rice, potatoes, bread   Try to avoid processed bread products like BISCUITS, CROISSANTS, MUFFINS    Do not drink juices or sodas, even if they are calorie zero or diet drinks and especially avoid using powders like crystalloids , ELIGIO-AIDS     Do not eat peanut butter     Do not eat sugar free cookies and cakes   Do not eat peaches, oranges, pineapples, raisins, grapes , canteloupe , honey dew, mangoes , watermelon  and fruit medleys    ----------------------------------------------------------------------------------------    Follow the steps  to get started on  DEXCOM / Danyel Olaton 2  for Medicare patients       Keep a log of blood sugars by checking 4 times a day    you must be on insulin shots at least 3 times a day   3. Contact one of the following  DME suppliers  and find out who accepts your plan   4.  Give that supplier  our  office info  and fax number [phone] supplies  is best  for  freestyle [facility] 2 - [phone] [facility] supplies   [phone]      [address] [place] [address] ( [address] [place] [address] )      [employer]   [phone]         ---------------------------------------------------------------------------------------------------------------------------------------------    crestor 5 mg at night         FARXIGA 10 MG    Before b-fast   - ( optional  by Gyn  advise  )         Check blood sugars immediately before each meal and at bedtime ( print and mail )     Optional    trulicity  3 mg once a week  because of cost       stay on  [employer]  MAX    50 units at night  ( [employer] [facility]PH insulin )     Novolog    insulin 8 units before breakfast, 8  units before lunch and 10  units before

## 2023-12-15 ENCOUNTER — TELEPHONE (OUTPATIENT)
Age: 68
End: 2023-12-15

## 2023-12-15 NOTE — TELEPHONE ENCOUNTER
I am changing sliding scale of   novolog  as follows with meals  If blood sugars are[de-identified]     150-200 mg 1 units PLUS 2 units      201-250 mg 3 units Plus  2  units       251-300 mg 5 units plus 2 units      301-350 mg 7 units plus 2 units      351-400 mg 9 units plus 2 units      401-450 mg 11 units plus 2      451-500 mg  13 units plus 2     Less than 70 mg NO INSULIN

## 2023-12-15 NOTE — TELEPHONE ENCOUNTER
Patient had cortizol injection and blood sugar is not coming down very well with sliding scall was in mid 300's now down to 271.  Would like to discuss

## 2023-12-15 NOTE — TELEPHONE ENCOUNTER
Spoke with patient today. Per Dr. Sebastián Thrasher message I read sliding scale to her. She read back and stated she understood. She will call office for any additional questions.

## 2023-12-21 PROBLEM — J45.40 MODERATE PERSISTENT ASTHMA WITHOUT COMPLICATION: Status: ACTIVE | Noted: 2023-12-21

## 2023-12-21 PROBLEM — F41.8 MIXED ANXIETY AND DEPRESSIVE DISORDER: Status: ACTIVE | Noted: 2023-12-21

## 2023-12-21 PROBLEM — M05.79 RHEUMATOID ARTHRITIS INVOLVING MULTIPLE SITES WITH POSITIVE RHEUMATOID FACTOR (HCC): Status: ACTIVE | Noted: 2023-12-21

## 2023-12-27 ENCOUNTER — HOSPITAL ENCOUNTER (OUTPATIENT)
Facility: HOSPITAL | Age: 68
Discharge: HOME | End: 2023-12-30
Attending: INTERNAL MEDICINE
Payer: MEDICARE

## 2023-12-27 VITALS — HEIGHT: 64 IN | WEIGHT: 282 LBS | BODY MASS INDEX: 48.14 KG/M2

## 2023-12-27 DIAGNOSIS — Z78.0 POST-MENOPAUSAL: ICD-10-CM

## 2023-12-27 DIAGNOSIS — Z12.31 SCREENING MAMMOGRAM FOR BREAST CANCER: ICD-10-CM

## 2023-12-27 PROCEDURE — 77063 BREAST TOMOSYNTHESIS BI: CPT

## 2023-12-27 PROCEDURE — 77080 DXA BONE DENSITY AXIAL: CPT

## 2024-01-31 ENCOUNTER — TELEPHONE (OUTPATIENT)
Age: 69
End: 2024-01-31

## 2024-01-31 ENCOUNTER — OFFICE VISIT (OUTPATIENT)
Age: 69
End: 2024-01-31
Payer: MEDICARE

## 2024-01-31 VITALS
BODY MASS INDEX: 48.79 KG/M2 | DIASTOLIC BLOOD PRESSURE: 80 MMHG | WEIGHT: 285.8 LBS | HEART RATE: 68 BPM | HEIGHT: 64 IN | OXYGEN SATURATION: 94 % | SYSTOLIC BLOOD PRESSURE: 126 MMHG

## 2024-01-31 DIAGNOSIS — E66.01 MORBID OBESITY (HCC): ICD-10-CM

## 2024-01-31 DIAGNOSIS — R00.2 PALPITATIONS: ICD-10-CM

## 2024-01-31 DIAGNOSIS — R07.9 CHEST PAIN, UNSPECIFIED TYPE: Primary | ICD-10-CM

## 2024-01-31 DIAGNOSIS — R06.09 DYSPNEA ON EXERTION: ICD-10-CM

## 2024-01-31 DIAGNOSIS — G47.33 OBSTRUCTIVE SLEEP APNEA: ICD-10-CM

## 2024-01-31 PROCEDURE — 1090F PRES/ABSN URINE INCON ASSESS: CPT | Performed by: STUDENT IN AN ORGANIZED HEALTH CARE EDUCATION/TRAINING PROGRAM

## 2024-01-31 PROCEDURE — 1036F TOBACCO NON-USER: CPT | Performed by: STUDENT IN AN ORGANIZED HEALTH CARE EDUCATION/TRAINING PROGRAM

## 2024-01-31 PROCEDURE — 93010 ELECTROCARDIOGRAM REPORT: CPT | Performed by: STUDENT IN AN ORGANIZED HEALTH CARE EDUCATION/TRAINING PROGRAM

## 2024-01-31 PROCEDURE — 3074F SYST BP LT 130 MM HG: CPT | Performed by: STUDENT IN AN ORGANIZED HEALTH CARE EDUCATION/TRAINING PROGRAM

## 2024-01-31 PROCEDURE — 3079F DIAST BP 80-89 MM HG: CPT | Performed by: STUDENT IN AN ORGANIZED HEALTH CARE EDUCATION/TRAINING PROGRAM

## 2024-01-31 PROCEDURE — 1123F ACP DISCUSS/DSCN MKR DOCD: CPT | Performed by: STUDENT IN AN ORGANIZED HEALTH CARE EDUCATION/TRAINING PROGRAM

## 2024-01-31 PROCEDURE — G8399 PT W/DXA RESULTS DOCUMENT: HCPCS | Performed by: STUDENT IN AN ORGANIZED HEALTH CARE EDUCATION/TRAINING PROGRAM

## 2024-01-31 PROCEDURE — G8427 DOCREV CUR MEDS BY ELIG CLIN: HCPCS | Performed by: STUDENT IN AN ORGANIZED HEALTH CARE EDUCATION/TRAINING PROGRAM

## 2024-01-31 PROCEDURE — G8417 CALC BMI ABV UP PARAM F/U: HCPCS | Performed by: STUDENT IN AN ORGANIZED HEALTH CARE EDUCATION/TRAINING PROGRAM

## 2024-01-31 PROCEDURE — G8484 FLU IMMUNIZE NO ADMIN: HCPCS | Performed by: STUDENT IN AN ORGANIZED HEALTH CARE EDUCATION/TRAINING PROGRAM

## 2024-01-31 PROCEDURE — 99204 OFFICE O/P NEW MOD 45 MIN: CPT | Performed by: STUDENT IN AN ORGANIZED HEALTH CARE EDUCATION/TRAINING PROGRAM

## 2024-01-31 PROCEDURE — 3017F COLORECTAL CA SCREEN DOC REV: CPT | Performed by: STUDENT IN AN ORGANIZED HEALTH CARE EDUCATION/TRAINING PROGRAM

## 2024-01-31 PROCEDURE — 93005 ELECTROCARDIOGRAM TRACING: CPT | Performed by: STUDENT IN AN ORGANIZED HEALTH CARE EDUCATION/TRAINING PROGRAM

## 2024-01-31 RX ORDER — MELOXICAM 15 MG/1
15 TABLET ORAL PRN
COMMUNITY

## 2024-01-31 NOTE — TELEPHONE ENCOUNTER
----- Message from Matilda Miller RN sent at 2024  9:39 AM EST -----  Regardin weeker  Per Dr. Bailey 2 week holter for palps.  Thanks

## 2024-01-31 NOTE — PROGRESS NOTES
had concerns including Chest Pain and New Patient.    Vitals:    01/31/24 0849   BP: 126/80   Site: Left Upper Arm   Position: Sitting   Pulse: 68   SpO2: 94%   Weight: 129.6 kg (285 lb 12.8 oz)   Height: 1.626 m (5' 4\")        Chest pain No    Refills No        1. Have you been to the ER, urgent care clinic since your last visit? No       Hospitalized since your last visit? No       Where?        Date?

## 2024-01-31 NOTE — PROGRESS NOT[place]  [address] [facility], [address]  [place] [address]    Office [phone],Fax [phone]           Sandy Zelaya is a 68 y.o. female presents to the office for evaluation of chest pain and exertional dyspnea.  Consult requested by Kasia Porras MD        Assessment/Recommendations:     Diagnosis Orders   1. Chest pain, unspecified type  EKG 12 Lead      2. Palpitations        3. Dyspnea on exertion        4. Obstructive sleep apnea        5. Morbid obesity (HCC)            Chest pain, exertional dyspnea.  Chronic stable in nature.  Patient has great difficulty deciphering out what is related to her asthma versus cardiac in nature.  Has been a long time since he had a stress test.  Recommend to proceed with echocardiogram and Lexiscan Cardiolite for further evaluation    Palpitations.  Patient has significant risk factors for paroxysmal atrial fibrillation.  Will proceed with 2-week event monitor    Type 2 diabetes, morbid obesity.  Last A1c 7.4%.  Managed by primary care.  Patient would tremendously benefit from GLP-1 therapy for management of her obesity and diabetes.  GLP-1 therapy will significantly decrease her insulin requirements.  Will defer to primary care physician.  Continue statin therapy.    Obstructive sleep apnea.  Patient has previously been on CPAP therapy.  It appears unclear patient stopped therapy many years ago.  Recommend referral to sleep medicine      Primary Care Physician- Kasia Porras MD    Follow-up after completion of above testing        Subjective:  68-year-old female with morbid obesity, type 2 diabetes on insulin therapy, hypertension, lymphedema presents the office for evaluation of chest pain and shortness of breath symptoms.  Patient reports having occasional tightness in her chest with exertion.  She also is dyspneic with activity.  Patient reports she has asthma and has difficulty deciphering what is her asthma versus

## 2024-02-29 ENCOUNTER — TELEPHONE (OUTPATIENT)
Age: 69
End: 2024-02-29

## 2024-02-29 NOTE — TELEPHONE ENCOUNTER
ID verified per protocol. Confirmed appointment(s) for 3-5 and 3-6-2024. Arrive @ [facility] or 606 @ 8:30. Patient instructed to be NPO x 4 hrs prior, no caffeine (not even decaf coffee,tea,rigo) x 24 hrs prior, wear comfortable clothes/good walking shoes. Patient also informed the test takes 2-4 hrs. Patient verbalized understanding and agrees with prep/test.

## 2024-03-04 ENCOUNTER — OFFICE VISIT (OUTPATIENT)
Age: 69
End: 2024-03-04
Payer: MEDICARE

## 2024-03-04 VITALS
TEMPERATURE: 97.2 F | RESPIRATION RATE: 14 BRPM | OXYGEN SATURATION: 99 % | BODY MASS INDEX: 49.37 KG/M2 | SYSTOLIC BLOOD PRESSURE: 112 MMHG | WEIGHT: 289.2 LBS | HEART RATE: 67 BPM | DIASTOLIC BLOOD PRESSURE: 66 MMHG | HEIGHT: 64 IN

## 2024-03-04 DIAGNOSIS — H60.503 ACUTE OTITIS EXTERNA OF BOTH EARS, UNSPECIFIED TYPE: Primary | ICD-10-CM

## 2024-03-04 DIAGNOSIS — Z79.4 TYPE 2 DIABETES MELLITUS WITH HYPERGLYCEMIA, WITH LONG-TERM CURRENT USE OF INSULIN (HCC): ICD-10-CM

## 2024-03-04 DIAGNOSIS — J30.1 SEASONAL ALLERGIC RHINITIS DUE TO POLLEN: ICD-10-CM

## 2024-03-04 DIAGNOSIS — J45.40 MODERATE PERSISTENT ASTHMA WITHOUT COMPLICATION: ICD-10-CM

## 2024-03-04 DIAGNOSIS — E11.65 TYPE 2 DIABETES MELLITUS WITH HYPERGLYCEMIA, WITH LONG-TERM CURRENT USE OF INSULIN (HCC): ICD-10-CM

## 2024-03-04 DIAGNOSIS — H60.543 ECZEMA OF BOTH EXTERNAL EARS: ICD-10-CM

## 2024-03-04 DIAGNOSIS — I10 ESSENTIAL HYPERTENSION WITH GOAL BLOOD PRESSURE LESS THAN 130/80: ICD-10-CM

## 2024-03-04 PROCEDURE — 1036F TOBACCO NON-USER: CPT | Performed by: STUDENT IN AN ORGANIZED HEALTH CARE EDUCATION/TRAINING PROGRAM

## 2024-03-04 PROCEDURE — 3078F DIAST BP <80 MM HG: CPT | Performed by: STUDENT IN AN ORGANIZED HEALTH CARE EDUCATION/TRAINING PROGRAM

## 2024-03-04 PROCEDURE — G8484 FLU IMMUNIZE NO ADMIN: HCPCS | Performed by: STUDENT IN AN ORGANIZED HEALTH CARE EDUCATION/TRAINING PROGRAM

## 2024-03-04 PROCEDURE — 2022F DILAT RTA XM EVC RTNOPTHY: CPT | Performed by: STUDENT IN AN ORGANIZED HEALTH CARE EDUCATION/TRAINING PROGRAM

## 2024-03-04 PROCEDURE — 3017F COLORECTAL CA SCREEN DOC REV: CPT | Performed by: STUDENT IN AN ORGANIZED HEALTH CARE EDUCATION/TRAINING PROGRAM

## 2024-03-04 PROCEDURE — 1123F ACP DISCUSS/DSCN MKR DOCD: CPT | Performed by: STUDENT IN AN ORGANIZED HEALTH CARE EDUCATION/TRAINING PROGRAM

## 2024-03-04 PROCEDURE — G2211 COMPLEX E/M VISIT ADD ON: HCPCS | Performed by: STUDENT IN AN ORGANIZED HEALTH CARE EDUCATION/TRAINING PROGRAM

## 2024-03-04 PROCEDURE — G8427 DOCREV CUR MEDS BY ELIG CLIN: HCPCS | Performed by: STUDENT IN AN ORGANIZED HEALTH CARE EDUCATION/TRAINING PROGRAM

## 2024-03-04 PROCEDURE — 1090F PRES/ABSN URINE INCON ASSESS: CPT | Performed by: STUDENT IN AN ORGANIZED HEALTH CARE EDUCATION/TRAINING PROGRAM

## 2024-03-04 PROCEDURE — 3046F HEMOGLOBIN A1C LEVEL >9.0%: CPT | Performed by: STUDENT IN AN ORGANIZED HEALTH CARE EDUCATION/TRAINING PROGRAM

## 2024-03-04 PROCEDURE — G8399 PT W/DXA RESULTS DOCUMENT: HCPCS | Performed by: STUDENT IN AN ORGANIZED HEALTH CARE EDUCATION/TRAINING PROGRAM

## 2024-03-04 PROCEDURE — 3074F SYST BP LT 130 MM HG: CPT | Performed by: STUDENT IN AN ORGANIZED HEALTH CARE EDUCATION/TRAINING PROGRAM

## 2024-03-04 PROCEDURE — 99214 OFFICE O/P EST MOD 30 MIN: CPT | Performed by: STUDENT IN AN ORGANIZED HEALTH CARE EDUCATION/TRAINING PROGRAM

## 2024-03-04 PROCEDURE — 4130F TOPICAL PREP RX AOE: CPT | Performed by: STUDENT IN AN ORGANIZED HEALTH CARE EDUCATION/TRAINING PROGRAM

## 2024-03-04 PROCEDURE — G8417 CALC BMI ABV UP PARAM F/U: HCPCS | Performed by: STUDENT IN AN ORGANIZED HEALTH CARE EDUCATION/TRAINING PROGRAM

## 2024-03-04 RX ORDER — NEOMYCIN SULFATE, POLYMYXIN B SULFATE AND HYDROCORTISONE 10; 3.5; 1 MG/ML; MG/ML; [USP'U]/ML
3 SUSPENSION/ DROPS AURICULAR (OTIC) 4 TIMES DAILY
Qty: 10 ML | Refills: 0 | Status: SHIPPED | OUTPATIENT
Start: 2024-03-04 | End: 2024-03-11

## 2024-03-04 NOTE — PROGRESS NOTES
Chief Complaint   Patient presents with    Otalgia     \"Have you been to the ER, urgent care clinic since your last visit?  Hospitalized since your last visit?\"    NO    “Have you seen or consulted any other health care providers outside of [facility] since your last visit?”    NO                   5/17/2023     9:25 AM   PHQ-9    Little interest or pleasure in doing things 0   Feeling down, depressed, or hopeless 0   PHQ-2 Score 0   PHQ-9 Total Score 0           Financial Resource Strain: Low Risk  (12/21/2023)    Overall Financial Resource Strain (CARDIA)     Difficulty of Paying Living Expenses: Not hard at all      Food Insecurity: No Food Insecurity (12/21/2023)    Hunger Vital Sign     Worried About Running Out of Food in the Last Year: Never true     Ran Out of Food in the Last Year: Never true          Health Maintenance Due   Topic Date Due    COVID-19 Vaccine (1) Never done    Pneumococcal 65+ years Vaccine (1 - PCV) Never done    Hepatitis C screen  Never done    DTaP/Tdap/Td vaccine (1 - Tdap) Never done    Shingles vaccine (1 of 2) Never done    Respiratory Syncytial Virus (RSV) Pregnant or age 60 yrs+ (1 - 1-dose 60+ series) Never done    Diabetic retinal exam  04/07/2016    Diabetic foot exam  05/12/2023    Flu vaccine (1) Never done    Annual Wellness Visit (Medicare Advantage)  Never done       Systane balance    Ocusoft plus     Dr. Beti Lares

## 2024-03-04 NOTE — PATIENT INSTRUCTIONS
Claritin  Saline nasal spray  Nasocort or Flonase, angle towards outside of nostril and don't sniff in!

## 2024-03-04 NOTE — PROGRESS NOTES
[facility] PRACTICE      Chief Complaint:     Chief Complaint   Patient presents with    Otalgia     Started Thursday. Warm compress, feels like something it stabbing at my eardrum. \"Feels juicy\" in my left ear. Ear fullness, ears are crusty.        Sandy Zelaya is a 69 y.o. female that presents for: ear problem      Assessment/Plan:     Sandy was seen today for otalgia.    Diagnoses and all orders for this visit:    Acute otitis externa of both ears, unspecified type  -     neomycin-polymyxin-hydrocortisone (CORTISPORIN) 3.5-49063-1 otic suspension; Place 3 drops into both ears 4 times daily for 7 days    Eczema of both external ears  -     neomycin-polymyxin-hydrocortisone (CORTISPORIN) 3.5-16816-0 otic suspension; Place 3 drops into both ears 4 times daily for 7 days    Seasonal allergic rhinitis due to pollen  -     neomycin-polymyxin-hydrocortisone (CORTISPORIN) 3.5-76631-4 otic suspension; Place 3 drops into both ears 4 times daily for 7 days    Type 2 diabetes mellitus with hyperglycemia, with long-term current use of insulin (HCC)    Essential hypertension with goal blood pressure less than 130/80    Moderate persistent asthma without complication       History of physical exam suggest cough is related to seasonal allergies, discussed she can try Claritin over-the-counter as well as saline nasal spray, Flonase.  Follow-up if cough does not improve    Follow up:     Return if symptoms worsen or fail to improve.    Subjective:   HPI:  Sandy Zelaya is a 69 y.o. female that presents for:    CC:  5 days of ear fullness BL/ Warm compress, feels like something it stabbing at my eardrum. \"Feels juicy\" in my left ear. Ear fullness, ears are crusty.   Dry cough x 2 weeks, itchy eyes    Health Maintenance:  Health Maintenance Due   Topic Date Due    COVID-19 Vaccine (1) Never done    Pneumococcal 65+ years Vaccine (1 - PCV) Never done    Hepatitis C screen  Never done    DTaP/Tdap/Td vaccine (1 - Tdap)

## 2024-03-05 ENCOUNTER — ANCILLARY PROCEDURE (OUTPATIENT)
Age: 69
End: 2024-03-05
Payer: MEDICARE

## 2024-03-05 VITALS
BODY MASS INDEX: 48.65 KG/M2 | HEART RATE: 67 BPM | WEIGHT: 285 LBS | DIASTOLIC BLOOD PRESSURE: 80 MMHG | SYSTOLIC BLOOD PRESSURE: 126 MMHG | HEIGHT: 64 IN

## 2024-03-05 VITALS
BODY MASS INDEX: 48.65 KG/M2 | DIASTOLIC BLOOD PRESSURE: 80 MMHG | WEIGHT: 285 LBS | SYSTOLIC BLOOD PRESSURE: 126 MMHG | HEIGHT: 64 IN | HEART RATE: 70 BPM

## 2024-03-05 DIAGNOSIS — R06.09 DYSPNEA ON EXERTION: ICD-10-CM

## 2024-03-05 DIAGNOSIS — R07.9 CHEST PAIN, UNSPECIFIED TYPE: ICD-10-CM

## 2024-03-05 LAB
ECHO AO ASC DIAM: 3.1 CM
ECHO AO ASCENDING AORTA INDEX: 1.37 CM/M2
ECHO AO ROOT DIAM: 3.3 CM
ECHO AO ROOT INDEX: 1.45 CM/M2
ECHO AV AREA PEAK VELOCITY: 2.3 CM2
ECHO AV AREA VTI: 2.6 CM2
ECHO AV AREA/BSA PEAK VELOCITY: 1 CM2/M2
ECHO AV AREA/BSA VTI: 1.1 CM2/M2
ECHO AV MEAN GRADIENT: 4 MMHG
ECHO AV MEAN VELOCITY: 1 M/S
ECHO AV PEAK GRADIENT: 10 MMHG
ECHO AV PEAK VELOCITY: 1.5 M/S
ECHO AV VELOCITY RATIO: 0.73
ECHO AV VTI: 30.8 CM
ECHO BSA: 2.42 M2
ECHO EST RA PRESSURE: 3 MMHG
ECHO LA DIAMETER INDEX: 1.59 CM/M2
ECHO LA DIAMETER: 3.6 CM
ECHO LA TO AORTIC ROOT RATIO: 1.09
ECHO LA VOL A-L A2C: 49 ML (ref 22–52)
ECHO LA VOL A-L A4C: 58 ML (ref 22–52)
ECHO LA VOL MOD A2C: 45 ML (ref 22–52)
ECHO LA VOL MOD A4C: 55 ML (ref 22–52)
ECHO LA VOLUME AREA LENGTH: 54 ML
ECHO LA VOLUME INDEX A-L A2C: 22 ML/M2 (ref 16–34)
ECHO LA VOLUME INDEX A-L A4C: 26 ML/M2 (ref 16–34)
ECHO LA VOLUME INDEX AREA LENGTH: 24 ML/M2 (ref 16–34)
ECHO LA VOLUME INDEX MOD A2C: 20 ML/M2 (ref 16–34)
ECHO LA VOLUME INDEX MOD A4C: 24 ML/M2 (ref 16–34)
ECHO LV E' LATERAL VELOCITY: 8 CM/S
ECHO LV E' SEPTAL VELOCITY: 10 CM/S
ECHO LV EDV A2C: 109 ML
ECHO LV EDV A4C: 121 ML
ECHO LV EDV BP: 117 ML (ref 56–104)
ECHO LV EDV INDEX A4C: 53 ML/M2
ECHO LV EDV INDEX BP: 52 ML/M2
ECHO LV EDV NDEX A2C: 48 ML/M2
ECHO LV EJECTION FRACTION A2C: 56 %
ECHO LV EJECTION FRACTION A4C: 63 %
ECHO LV EJECTION FRACTION BIPLANE: 60 % (ref 55–100)
ECHO LV ESV A2C: 48 ML
ECHO LV ESV A4C: 45 ML
ECHO LV ESV BP: 47 ML (ref 19–49)
ECHO LV ESV INDEX A2C: 21 ML/M2
ECHO LV ESV INDEX A4C: 20 ML/M2
ECHO LV ESV INDEX BP: 21 ML/M2
ECHO LV FRACTIONAL SHORTENING: 31 % (ref 28–44)
ECHO LV INTERNAL DIMENSION DIASTOLE INDEX: 2.29 CM/M2
ECHO LV INTERNAL DIMENSION DIASTOLIC: 5.2 CM (ref 3.9–5.3)
ECHO LV INTERNAL DIMENSION SYSTOLIC INDEX: 1.59 CM/M2
ECHO LV INTERNAL DIMENSION SYSTOLIC: 3.6 CM
ECHO LV IVSD: 0.9 CM (ref 0.6–0.9)
ECHO LV MASS 2D: 169 G (ref 67–162)
ECHO LV MASS INDEX 2D: 74.4 G/M2 (ref 43–95)
ECHO LV POSTERIOR WALL DIASTOLIC: 0.9 CM (ref 0.6–0.9)
ECHO LV RELATIVE WALL THICKNESS RATIO: 0.35
ECHO LVOT AREA: 3.1 CM2
ECHO LVOT AV VTI INDEX: 0.8
ECHO LVOT DIAM: 2 CM
ECHO LVOT MEAN GRADIENT: 3 MMHG
ECHO LVOT PEAK GRADIENT: 5 MMHG
ECHO LVOT PEAK VELOCITY: 1.1 M/S
ECHO LVOT STROKE VOLUME INDEX: 34 ML/M2
ECHO LVOT SV: 77.2 ML
ECHO LVOT VTI: 24.6 CM
ECHO MV A VELOCITY: 0.89 M/S
ECHO MV AREA PHT: 3.9 CM2
ECHO MV AREA VTI: 2.4 CM2
ECHO MV E DECELERATION TIME (DT): 196 MS
ECHO MV E VELOCITY: 0.79 M/S
ECHO MV E/A RATIO: 0.89
ECHO MV E/E' LATERAL: 9.88
ECHO MV E/E' RATIO (AVERAGED): 8.89
ECHO MV LVOT VTI INDEX: 1.28
ECHO MV MAX VELOCITY: 1 M/S
ECHO MV MEAN GRADIENT: 2 MMHG
ECHO MV MEAN VELOCITY: 0.6 M/S
ECHO MV PEAK GRADIENT: 4 MMHG
ECHO MV PRESSURE HALF TIME (PHT): 56.9 MS
ECHO MV VTI: 31.6 CM
ECHO RA AREA 4C: 18.2 CM2
ECHO RA END SYSTOLIC VOLUME APICAL 4 CHAMBER INDEX BSA: 22 ML/M2
ECHO RA VOLUME: 49 ML
ECHO RIGHT VENTRICULAR SYSTOLIC PRESSURE (RVSP): 34 MMHG
ECHO RV INTERNAL DIMENSION: 3.9 CM
ECHO RV TAPSE: 2.9 CM (ref 1.7–?)
ECHO TV REGURGITANT MAX VELOCITY: 2.8 M/S
ECHO TV REGURGITANT PEAK GRADIENT: 31 MMHG

## 2024-03-05 PROCEDURE — A9500 TC99M SESTAMIBI: HCPCS | Performed by: INTERNAL MEDICINE

## 2024-03-05 PROCEDURE — 93306 TTE W/DOPPLER COMPLETE: CPT | Performed by: SPECIALIST

## 2024-03-05 RX ORDER — REGADENOSON 0.08 MG/ML
0.4 INJECTION, SOLUTION INTRAVENOUS
Status: COMPLETED | OUTPATIENT
Start: 2024-03-05 | End: 2024-03-05

## 2024-03-05 RX ORDER — TETRAKIS(2-METHOXYISOBUTYLISOCYANIDE)COPPER(I) TETRAFLUOROBORATE 1 MG/ML
26.4 INJECTION, POWDER, LYOPHILIZED, FOR SOLUTION INTRAVENOUS
Status: COMPLETED | OUTPATIENT
Start: 2024-03-05 | End: 2024-03-05

## 2024-03-05 RX ADMIN — TECHNETIUM TC-99M SESTAMIBI 26.4 MILLICURIE: 1 INJECTION INTRAVENOUS at 09:35

## 2024-03-05 RX ADMIN — REGADENOSON 0.4 MG: 0.08 INJECTION, SOLUTION INTRAVENOUS at 08:36

## 2024-03-05 NOTE — RESULT ENCOUNTER NOTE
Dear Ms. Nathalie,  Your echo results show normal heart function.   Please let me know if you have questions.  Best Regards,  ABBIE Feliciano NP

## 2024-03-06 PROCEDURE — A9500 TC99M SESTAMIBI: HCPCS | Performed by: STUDENT IN AN ORGANIZED HEALTH CARE EDUCATION/TRAINING PROGRAM

## 2024-03-06 RX ORDER — TETRAKIS(2-METHOXYISOBUTYLISOCYANIDE)COPPER(I) TETRAFLUOROBORATE 1 MG/ML
25.7 INJECTION, POWDER, LYOPHILIZED, FOR SOLUTION INTRAVENOUS
Status: COMPLETED | OUTPATIENT
Start: 2024-03-06 | End: 2024-03-06

## 2024-03-06 RX ADMIN — TECHNETIUM TC 99M SESTAMIBI 25.7 MILLICURIE: 1 INJECTION, POWDER, FOR SOLUTION INTRAVENOUS at 08:30

## 2024-03-07 ENCOUNTER — TELEPHONE (OUTPATIENT)
Age: 69
End: 2024-03-07

## 2024-03-07 DIAGNOSIS — Z01.818 PRE-OP TESTING: Primary | ICD-10-CM

## 2024-03-07 LAB
ECHO BSA: 2.42 M2
NUC STRESS EJECTION FRACTION: 62 %
STRESS BASELINE DIAS BP: 80 MMHG
STRESS BASELINE HR: 66 BPM
STRESS BASELINE ST DEPRESSION: 0 MM
STRESS BASELINE SYS BP: 126 MMHG
STRESS ESTIMATED WORKLOAD: 1 METS
STRESS EXERCISE DUR MIN: 0 MIN
STRESS EXERCISE DUR SEC: 0 SEC
STRESS O2 SAT PEAK: 94 %
STRESS O2 SAT REST: 94 %
STRESS PEAK DIAS BP: 84 MMHG
STRESS PEAK SYS BP: 130 MMHG
STRESS PERCENT HR ACHIEVED: 56 %
STRESS POST PEAK HR: 84 BPM
STRESS RATE PRESSURE PRODUCT: NORMAL BPM*MMHG
STRESS ST DEPRESSION: 0 MM
STRESS TARGET HR: 151 BPM
TID: 1.17

## 2024-03-08 ENCOUNTER — TELEPHONE (OUTPATIENT)
Age: 69
End: 2024-03-08

## 2024-03-08 ENCOUNTER — PATIENT MESSAGE (OUTPATIENT)
Age: 69
End: 2024-03-08

## 2024-03-08 DIAGNOSIS — R94.39 ABNORMAL STRESS TEST: Primary | ICD-10-CM

## 2024-03-08 NOTE — TELEPHONE ENCOUNTER
Spoke with Pt of [facility]. For 3/21/24 At 8am arrive at 6:30am Pt aware that they need a  they must stay on site NPO from Midnight the night before. Check in at the second floor Outpt. Reg. Desk. Pt is to have Labs done between 3/8/24-3/14/24.   Medications:  Hold Lasix day of procedure  If you take Insulin in the morning hold it the morning of. If you take it at night take 1/2 unit or dose of Insulin   VO by /nurse Matilda VARGAS

## 2024-03-08 NOTE — TELEPHONE ENCOUNTER
Patient is returning call to discuss her nuclear stress test results per BENTLEY DE LA CRUZ    [phone]

## 2024-03-11 ENCOUNTER — DIRECT ADMIT ORDERS (OUTPATIENT)
Age: 69
End: 2024-03-11

## 2024-03-11 DIAGNOSIS — R94.39 ABNORMAL CARDIOVASCULAR STRESS TEST: Primary | ICD-10-CM

## 2024-03-11 RX ORDER — SODIUM CHLORIDE 9 MG/ML
INJECTION, SOLUTION INTRAVENOUS PRN
OUTPATIENT
Start: 2024-03-11

## 2024-03-11 RX ORDER — SODIUM CHLORIDE 0.9 % (FLUSH) 0.9 %
5-40 SYRINGE (ML) INJECTION EVERY 12 HOURS SCHEDULED
OUTPATIENT
Start: 2024-03-11

## 2024-03-11 RX ORDER — SODIUM CHLORIDE 0.9 % (FLUSH) 0.9 %
5-40 SYRINGE (ML) INJECTION PRN
OUTPATIENT
Start: 2024-03-11

## 2024-03-12 DIAGNOSIS — Z01.818 PRE-OP TESTING: ICD-10-CM

## 2024-03-13 LAB
ANION GAP SERPL CALC-SCNC: 5 MMOL/L (ref 5–15)
BUN SERPL-MCNC: 17 MG/DL (ref 6–20)
BUN/CREAT SERPL: 20 (ref 12–20)
CALCIUM SERPL-MCNC: 9 MG/DL (ref 8.5–10.1)
CHLORIDE SERPL-SCNC: 110 MMOL/L (ref 97–108)
CO2 SERPL-SCNC: 28 MMOL/L (ref 21–32)
CREAT SERPL-MCNC: 0.85 MG/DL (ref 0.55–1.02)
ERYTHROCYTE [DISTWIDTH] IN BLOOD BY AUTOMATED COUNT: 16.2 % (ref 11.5–14.5)
GLUCOSE SERPL-MCNC: 145 MG/DL (ref 65–100)
HCT VFR BLD AUTO: 32.3 % (ref 35–47)
HGB BLD-MCNC: 11.5 G/DL (ref 11.5–16)
MCH RBC QN AUTO: 32.8 PG (ref 26–34)
MCHC RBC AUTO-ENTMCNC: 35.6 G/DL (ref 30–36.5)
MCV RBC AUTO: 92 FL (ref 80–99)
NRBC # BLD: 0 K/UL (ref 0–0.01)
NRBC BLD-RTO: 0 PER 100 WBC
PLATELET # BLD AUTO: 263 K/UL (ref 150–400)
PMV BLD AUTO: 10.7 FL (ref 8.9–12.9)
POTASSIUM SERPL-SCNC: 4.2 MMOL/L (ref 3.5–5.1)
RBC # BLD AUTO: 3.51 M/UL (ref 3.8–5.2)
SODIUM SERPL-SCNC: 143 MMOL/L (ref 136–145)
WBC # BLD AUTO: 5.7 K/UL (ref 3.6–11)

## 2024-03-18 ENCOUNTER — TELEPHONE (OUTPATIENT)
Age: 69
End: 2024-03-18

## 2024-03-20 NOTE — TELEPHONE ENCOUNTER
Spoke To Pt:  Just a reminder not to forget your LHC  scheduled for tomorrow.  Arriving at 6:30am  You will need a  must stay on site  NPO from midnight   check in at the 2nd floor outpt. reg. Desk.  Medications:  Hold Lasix day of procedure  Take 1/2 unit of Evening Insulin and Hold morning dose of Insulin  VO by /nurse Matilda TUCKER

## 2024-03-21 ENCOUNTER — HOSPITAL ENCOUNTER (OUTPATIENT)
Facility: HOSPITAL | Age: 69
Setting detail: OUTPATIENT SURGERY
Discharge: HOME | End: 2024-03-21
Attending: STUDENT IN AN ORGANIZED HEALTH CARE EDUCATION/TRAINING PROGRAM | Admitting: STUDENT IN AN ORGANIZED HEALTH CARE EDUCATION/TRAINING PROGRAM
Payer: MEDICARE

## 2024-03-21 VITALS
HEIGHT: 64 IN | HEART RATE: 76 BPM | OXYGEN SATURATION: 96 % | WEIGHT: 290 LBS | RESPIRATION RATE: 18 BRPM | SYSTOLIC BLOOD PRESSURE: 132 MMHG | TEMPERATURE: 98.4 F | BODY MASS INDEX: 49.51 KG/M2 | DIASTOLIC BLOOD PRESSURE: 61 MMHG

## 2024-03-21 DIAGNOSIS — R94.39 ABNORMAL STRESS TEST: ICD-10-CM

## 2024-03-21 DIAGNOSIS — R94.39 ABNORMAL CARDIOVASCULAR STRESS TEST: ICD-10-CM

## 2024-03-21 LAB
ECHO BSA: 2.44 M2
GLUCOSE BLD STRIP.AUTO-MCNC: 188 MG/DL (ref 65–117)
SERVICE CMNT-IMP: ABNORMAL

## 2024-03-21 PROCEDURE — 99152 MOD SED SAME PHYS/QHP 5/>YRS: CPT | Performed by: STUDENT IN AN ORGANIZED HEALTH CARE EDUCATION/TRAINING PROGRAM

## 2024-03-21 PROCEDURE — 76937 US GUIDE VASCULAR ACCESS: CPT | Performed by: STUDENT IN AN ORGANIZED HEALTH CARE EDUCATION/TRAINING PROGRAM

## 2024-03-21 PROCEDURE — 93458 L HRT ARTERY/VENTRICLE ANGIO: CPT | Performed by: STUDENT IN AN ORGANIZED HEALTH CARE EDUCATION/TRAINING PROGRAM

## 2024-03-21 PROCEDURE — C1769 GUIDE WIRE: HCPCS | Performed by: STUDENT IN AN ORGANIZED HEALTH CARE EDUCATION/TRAINING PROGRAM

## 2024-03-21 PROCEDURE — 6360000004 HC RX CONTRAST MEDICATION: Performed by: STUDENT IN AN ORGANIZED HEALTH CARE EDUCATION/TRAINING PROGRAM

## 2024-03-21 PROCEDURE — 82962 GLUCOSE BLOOD TEST: CPT

## 2024-03-21 PROCEDURE — C1894 INTRO/SHEATH, NON-LASER: HCPCS | Performed by: STUDENT IN AN ORGANIZED HEALTH CARE EDUCATION/TRAINING PROGRAM

## 2024-03-21 PROCEDURE — 2709999900 HC NON-CHARGEABLE SUPPLY: Performed by: STUDENT IN AN ORGANIZED HEALTH CARE EDUCATION/TRAINING PROGRAM

## 2024-03-21 PROCEDURE — 2500000003 HC RX 250 WO HCPCS: Performed by: STUDENT IN AN ORGANIZED HEALTH CARE EDUCATION/TRAINING PROGRAM

## 2024-03-21 PROCEDURE — 6360000002 HC RX W HCPCS: Performed by: STUDENT IN AN ORGANIZED HEALTH CARE EDUCATION/TRAINING PROGRAM

## 2024-03-21 RX ORDER — HEPARIN SODIUM 1000 [USP'U]/ML
INJECTION, SOLUTION INTRAVENOUS; SUBCUTANEOUS PRN
Status: DISCONTINUED | OUTPATIENT
Start: 2024-03-21 | End: 2024-03-21 | Stop reason: HOSPADM

## 2024-03-21 RX ORDER — SODIUM CHLORIDE 0.9 % (FLUSH) 0.9 %
5-40 SYRINGE (ML) INJECTION PRN
Status: DISCONTINUED | OUTPATIENT
Start: 2024-03-21 | End: 2024-03-21 | Stop reason: HOSPADM

## 2024-03-21 RX ORDER — HEPARIN SODIUM 200 [USP'U]/100ML
INJECTION, SOLUTION INTRAVENOUS PRN
Status: DISCONTINUED | OUTPATIENT
Start: 2024-03-21 | End: 2024-03-21 | Stop reason: HOSPADM

## 2024-03-21 RX ORDER — SODIUM CHLORIDE 0.9 % (FLUSH) 0.9 %
5-40 SYRINGE (ML) INJECTION EVERY 12 HOURS SCHEDULED
Status: DISCONTINUED | OUTPATIENT
Start: 2024-03-21 | End: 2024-03-21 | Stop reason: HOSPADM

## 2024-03-21 RX ORDER — LIDOCAINE HYDROCHLORIDE 10 MG/ML
INJECTION, SOLUTION INFILTRATION; PERINEURAL PRN
Status: DISCONTINUED | OUTPATIENT
Start: 2024-03-21 | End: 2024-03-21 | Stop reason: HOSPADM

## 2024-03-21 RX ORDER — MIDAZOLAM HYDROCHLORIDE 1 MG/ML
INJECTION INTRAMUSCULAR; INTRAVENOUS PRN
Status: DISCONTINUED | OUTPATIENT
Start: 2024-03-21 | End: 2024-03-21 | Stop reason: HOSPADM

## 2024-03-21 RX ORDER — VERAPAMIL HYDROCHLORIDE 2.5 MG/ML
INJECTION, SOLUTION INTRAVENOUS PRN
Status: DISCONTINUED | OUTPATIENT
Start: 2024-03-21 | End: 2024-03-21 | Stop reason: HOSPADM

## 2024-03-21 RX ORDER — ACETAMINOPHEN 325 MG/1
650 TABLET ORAL EVERY 4 HOURS PRN
Status: DISCONTINUED | OUTPATIENT
Start: 2024-03-21 | End: 2024-03-21 | Stop reason: HOSPADM

## 2024-03-21 RX ORDER — SODIUM CHLORIDE 9 MG/ML
INJECTION, SOLUTION INTRAVENOUS PRN
Status: DISCONTINUED | OUTPATIENT
Start: 2024-03-21 | End: 2024-03-21 | Stop reason: HOSPADM

## 2024-03-21 RX ORDER — FENTANYL CITRATE 50 UG/ML
INJECTION, SOLUTION INTRAMUSCULAR; INTRAVENOUS PRN
Status: DISCONTINUED | OUTPATIENT
Start: 2024-03-21 | End: 2024-03-21 | Stop reason: HOSPADM

## 2024-03-21 NOTE — PROGRESS NOTES
7:10 am  Patient arrived. ID and allergies verified verbally with patient. Pt voices understanding of procedure to be performed. Consent obtained. Pt prepped for procedure. Pt denies contrast allergy. Patient denies taking any blood thinners.    8:10 AM  TRANSFER - OUT REPORT:    Verbal report given to Aileen on Sandy Zelaya  being transferred to cath lab for ordered procedure       Report consisted of patient's Situation, Background, Assessment and   Recommendations(SBAR).     Information from the following report(s) Nurse Handoff Report was reviewed with the receiving nurse.           Lines:   Peripheral IV 03/06/24 Right Antecubital (Active)       Peripheral IV 03/21/24 Left Forearm (Active)        Opportunity for questions and clarification was provided.      Patient transported with:  Registered Nurse    8:50 AM  TRANSFER - IN REPORT:    Verbal report received from cath lab on Sandy Zelaya  being received from cath lab for routine post-op      Report consisted of patient's Situation, Background, Assessment and   Recommendations(SBAR).     Information from the following report(s) Nurse Handoff Report was reviewed with the receiving nurse.    Opportunity for questions and clarification was provided.      Assessment completed upon patient's arrival to unit and care assumed.         10:10 am  2 ml air released from TR Band. No bleeding or hematoma noted. Radial and Ulnar pulse on right wrist palpable. Pt tolerated well. Will continue to monitor.  10:15 am  2 ml air released from TR Band. No bleeding or hematoma noted. Radial and Ulnar pulse on right wrist palpable. Pt tolerated well. Will continue to monitor.  10:20 am  2 ml air released from TR Band. No bleeding or hematoma noted. Radial and Ulnar pulse on right wrist palpable. Pt tolerated well. Will continue to monitor.  10:25 am  3 ml air released from TR Band. No bleeding or hematoma noted. Radial and Ulnar pulse on rith wrist palpable. Pt tolerated well. Will

## 2024-03-21 NOTE — DISCHARGE INSTRUCTIONS
Radial Cardiac Catheterization/Angiography Discharge Instructions    It is normal to feel tired the first couple days.  Take it easy and follow the physician’s instructions.      CHECK THE CATHETER INSERTION SITE DAILY:    Remove the wrist dressing 24 hours after the procedure.  You may shower 24 hours after the procedure.  Wash with soap and water and pat dry.  Gentle cleaning of the site with soap and water is sufficient, cover with a dry clean dressing or bandage.  Do not apply creams or powders to the area.  No soaking the wrist for 3 days.  Leave the puncture site open to air after 24 hours post-procedure.    CALL THE PHYSICIANS:     If the site becomes red, swollen or feels warm to the touch  If there is bleeding or drainage or if there is unusual pain at the radial site.     If there is any minor oozing, you may apply a band-aid and remove after 12 hours.   If the bleeding continues, hold pressure with the middle finger against the puncture site and the thumb against the back of the wrist,call 911 to be transported to the hospital.  DO NOT DRIVE YOURSELF, OR HAVE ANYONE ELSE DRIVE YOU - CALL 911.    ACTIVITY:   For the first 24 hours do not manipulate the wrist.  No lifting, pushing or pulling over 3-5 pounds with the affected wrist for 7 daysand no straining the insertion site. Do not life grocery bags or the garbage can, do not run the vacuum  or  for 7 days.  Start with short walks as in the hospital and gradually increase as tolerated each day.  It is recommended to walk 30 minutes 5-7 days per week.  Follow your physician’s instructions on activity.  Avoid walking outside in extremes of heat or cold.  Walk inside when it is cold and windy or hot and humid.     Things to keep in mind:  No driving for at least 24 hours, or as designated by your physician.  Limit the number of times you go up and down the stairs  Take rests and pace yourself with activity.  Be careful and do not strain with

## 2024-03-21 NOTE — PROCEDURES
BRIEF PROCEDURE NOTE    Date of Procedure: 3/21/2024   Preoperative Diagnosis:  abnormal stress test  Postoperative Diagnosis: angiographically normal coronary arteries    Procedure: Left heart cath, coronary angiography, moderate sedation  Interventional Cardiologist: Jose Bailey DO  Assistant: None  Anesthesia: local + IV moderate sedation   I administered moderate sedation throughout this procedure. An independent trained observer pushed medications at my direction, and monitored the patient’s level of consciousness and physiological status throughout.  Estimated Blood Loss: Minimal    Access: right radial artery, 6F  Catheters:  Left coronary:JL 3.5, 5f  Right coronary: JR 4, 5f    Findings:   L Main:large caliber, short, angiographically normal   LAD:large caliber, moderate caliber diagonal, angiographically normal   LCx:large caliber, two moderate OM, angiographically normal   RCA:large caliber, moderate to large pda, moderate pl branch, angiographically normal     LVEDP:  17 mmhg      Specimens Removed: None    Implants: n/a    Closure Device: radial TR band    See full cath note.    Complications: none      Findings:  1. angiographically normal coronary arteries    2. Mildly elevated lvedp  Plan:  Cont current medical regimen  [place]  [id number], [address]  [place] [address]                                              Office [phone],Fax [phone]

## 2024-03-25 ENCOUNTER — TELEPHONE (OUTPATIENT)
Age: 69
End: 2024-03-25

## 2024-03-25 NOTE — TELEPHONE ENCOUNTER
Patient called to schedule a follow up appointment from her surgery on 3/21/2024. Patient is scheduled for 4/16/2024. Patient wants to know if she needs to come in sooner, she would like a callback.     Patient phone # [phone]  ##############################    Unable to reach pt. Message left detailed message explaining Dr. Bailey will let her know after her procedure if she needs to move her appointment

## 2024-03-25 NOTE — TELEPHONE ENCOUNTER
Patient called to schedule a follow up appointment from her surgery on 3/21/2024. Patient is scheduled for 4/16/2024. Patient wants to know if she needs to come in sooner, she would like a callback.     Patient phone # [phone]

## 2024-04-03 SDOH — HEALTH STABILITY: PHYSICAL HEALTH: ON AVERAGE, HOW MANY MINUTES DO YOU ENGAGE IN EXERCISE AT THIS LEVEL?: 0 MIN

## 2024-04-03 SDOH — HEALTH STABILITY: PHYSICAL HEALTH: ON AVERAGE, HOW MANY DAYS PER WEEK DO YOU ENGAGE IN MODERATE TO STRENUOUS EXERCISE (LIKE A BRISK WALK)?: 2 DAYS

## 2024-04-03 ASSESSMENT — LIFESTYLE VARIABLES
HOW OFTEN DO YOU HAVE SIX OR MORE DRINKS ON ONE OCCASION: 1
HOW MANY STANDARD DRINKS CONTAINING ALCOHOL DO YOU HAVE ON A TYPICAL DAY: 1 OR 2
HOW MANY STANDARD DRINKS CONTAINING ALCOHOL DO YOU HAVE ON A TYPICAL DAY: 1
HOW OFTEN DO YOU HAVE A DRINK CONTAINING ALCOHOL: 2
HOW OFTEN DO YOU HAVE A DRINK CONTAINING ALCOHOL: MONTHLY OR LESS

## 2024-04-03 ASSESSMENT — PATIENT HEALTH QUESTIONNAIRE - PHQ9
SUM OF ALL RESPONSES TO PHQ QUESTIONS 1-9: 1
SUM OF ALL RESPONSES TO PHQ9 QUESTIONS 1 & 2: 1
SUM OF ALL RESPONSES TO PHQ QUESTIONS 1-9: 1
2. FEELING DOWN, DEPRESSED OR HOPELESS: NOT AT ALL
1. LITTLE INTEREST OR PLEASURE IN DOING THINGS: SEVERAL DAYS

## 2024-04-04 ENCOUNTER — OFFICE VISIT (OUTPATIENT)
Age: 69
End: 2024-04-04
Payer: MEDICARE

## 2024-04-04 ENCOUNTER — ANCILLARY PROCEDURE (OUTPATIENT)
Age: 69
End: 2024-04-04
Payer: MEDICARE

## 2024-04-04 VITALS
DIASTOLIC BLOOD PRESSURE: 64 MMHG | BODY MASS INDEX: 47.46 KG/M2 | OXYGEN SATURATION: 98 % | HEIGHT: 64 IN | SYSTOLIC BLOOD PRESSURE: 108 MMHG | WEIGHT: 278 LBS | TEMPERATURE: 97.8 F | HEART RATE: 76 BPM

## 2024-04-04 DIAGNOSIS — E11.65 TYPE 2 DIABETES MELLITUS WITH HYPERGLYCEMIA, WITH LONG-TERM CURRENT USE OF INSULIN (HCC): ICD-10-CM

## 2024-04-04 DIAGNOSIS — E78.2 MIXED HYPERLIPIDEMIA: ICD-10-CM

## 2024-04-04 DIAGNOSIS — E55.9 VITAMIN D DEFICIENCY, UNSPECIFIED: ICD-10-CM

## 2024-04-04 DIAGNOSIS — S46.911A STRAIN OF RIGHT SHOULDER, INITIAL ENCOUNTER: ICD-10-CM

## 2024-04-04 DIAGNOSIS — Z00.00 MEDICARE ANNUAL WELLNESS VISIT, SUBSEQUENT: Primary | ICD-10-CM

## 2024-04-04 DIAGNOSIS — Z59.86 FINANCIAL INSECURITY DUE TO MEDICAL EXPENSES: ICD-10-CM

## 2024-04-04 DIAGNOSIS — Z23 NEED FOR PNEUMOCOCCAL VACCINATION: ICD-10-CM

## 2024-04-04 DIAGNOSIS — I10 ESSENTIAL HYPERTENSION WITH GOAL BLOOD PRESSURE LESS THAN 130/80: ICD-10-CM

## 2024-04-04 DIAGNOSIS — J45.40 MODERATE PERSISTENT ASTHMA WITHOUT COMPLICATION: ICD-10-CM

## 2024-04-04 DIAGNOSIS — E53.8 VITAMIN B12 DEFICIENCY: ICD-10-CM

## 2024-04-04 DIAGNOSIS — F41.8 MIXED ANXIETY AND DEPRESSIVE DISORDER: ICD-10-CM

## 2024-04-04 DIAGNOSIS — E11.42 DM TYPE 2 WITH DIABETIC PERIPHERAL NEUROPATHY (HCC): ICD-10-CM

## 2024-04-04 DIAGNOSIS — Z79.4 TYPE 2 DIABETES MELLITUS WITH HYPERGLYCEMIA, WITH LONG-TERM CURRENT USE OF INSULIN (HCC): ICD-10-CM

## 2024-04-04 PROCEDURE — 1123F ACP DISCUSS/DSCN MKR DOCD: CPT | Performed by: INTERNAL MEDICINE

## 2024-04-04 PROCEDURE — 3078F DIAST BP <80 MM HG: CPT | Performed by: INTERNAL MEDICINE

## 2024-04-04 PROCEDURE — PBSHW PNEUMOCOCCAL, PCV20, PREVNAR 20, (AGE 6W+), IM, PF: Performed by: INTERNAL MEDICINE

## 2024-04-04 PROCEDURE — 73030 X-RAY EXAM OF SHOULDER: CPT

## 2024-04-04 PROCEDURE — 90677 PCV20 VACCINE IM: CPT | Performed by: INTERNAL MEDICINE

## 2024-04-04 PROCEDURE — 3017F COLORECTAL CA SCREEN DOC REV: CPT | Performed by: INTERNAL MEDICINE

## 2024-04-04 PROCEDURE — 3046F HEMOGLOBIN A1C LEVEL >9.0%: CPT | Performed by: INTERNAL MEDICINE

## 2024-04-04 PROCEDURE — 3074F SYST BP LT 130 MM HG: CPT | Performed by: INTERNAL MEDICINE

## 2024-04-04 PROCEDURE — G0439 PPPS, SUBSEQ VISIT: HCPCS | Performed by: INTERNAL MEDICINE

## 2024-04-04 RX ORDER — ROSUVASTATIN CALCIUM 5 MG/1
5 TABLET, COATED ORAL EVERY EVENING
Qty: 90 TABLET | Refills: 1 | Status: SHIPPED | OUTPATIENT
Start: 2024-04-04

## 2024-04-04 SDOH — ECONOMIC STABILITY: HOUSING INSECURITY
IN THE LAST 12 MONTHS, WAS THERE A TIME WHEN YOU DID NOT HAVE A STEADY PLACE TO SLEEP OR SLEPT IN A SHELTER (INCLUDING NOW)?: NO

## 2024-04-04 SDOH — ECONOMIC STABILITY: FOOD INSECURITY: WITHIN THE PAST 12 MONTHS, THE FOOD YOU BOUGHT JUST DIDN'T LAST AND YOU DIDN'T HAVE MONEY TO GET MORE.: NEVER TRUE

## 2024-04-04 SDOH — ECONOMIC STABILITY: INCOME INSECURITY: HOW HARD IS IT FOR YOU TO PAY FOR THE VERY BASICS LIKE FOOD, HOUSING, MEDICAL CARE, AND HEATING?: VERY HARD

## 2024-04-04 SDOH — ECONOMIC STABILITY - INCOME SECURITY: FINANCIAL INSECURITY: Z59.86

## 2024-04-04 SDOH — ECONOMIC STABILITY: FOOD INSECURITY: WITHIN THE PAST 12 MONTHS, YOU WORRIED THAT YOUR FOOD WOULD RUN OUT BEFORE YOU GOT MONEY TO BUY MORE.: NEVER TRUE

## 2024-04-04 ASSESSMENT — ENCOUNTER SYMPTOMS
COLOR CHANGE: 0
FACIAL SWELLING: 0
CHEST TIGHTNESS: 0
WHEEZING: 1
COUGH: 0
SHORTNESS OF BREATH: 1
SORE THROAT: 0
NAUSEA: 0
VOMITING: 0
ABDOMINAL PAIN: 0
RHINORRHEA: 0

## 2024-04-04 NOTE — ASSESSMENT & PLAN NOTE
Symptoms are not well-controlled, is unable to get the Symbicort inhaler because of price.  Has been using her albuterol as needed.  Will try to get a nebulizer machine and solution if approved by her insurance.

## 2024-04-04 NOTE — ASSESSMENT & PLAN NOTE
Patient seen endocrinology for her diabetic care.  Has been seen podiatry for the neuropathy and getting laser treatment every 4 to 6 weeks.  Also takes gabapentin 100 mg at bedtime which does help.

## 2024-04-04 NOTE — PATIENT INSTRUCTIONS
or mental response. Stress is a fact of life for most people, and it affects everyone differently. What causes stress for you may not be stressful for someone else.  A lot of things can cause stress. You may feel stress when you go on a job interview, take a test, or run a race. This kind of short-term stress is normal and even useful. It can help you if you need to work hard or react quickly. For example, stress can help you finish an important job on time.  Long-term stress is caused by ongoing stressful situations or events. Examples of long-term stress include long-term health problems, ongoing problems at work, or conflicts in your family. Long-term stress can harm your health.  How does stress affect your health?  When you are stressed, your body responds as though you are in danger. It makes hormones that speed up your heart, make you breathe faster, and give you a burst of energy. This is called the fight-or-flight stress response. If the stress is over quickly, your body goes back to normal and no harm is done.  But if stress happens too often or lasts too long, it can have bad effects. Long-term stress can make you more likely to get sick, and it can make symptoms of some diseases worse. If you tense up when you are stressed, you may develop neck, shoulder, or low back pain. Stress is linked to high blood pressure and heart disease.  Stress also harms your emotional health. It can make you bennett, tense, or depressed. Your relationships may suffer, and you may not do well at work or school.  What can you do to manage stress?  You can try these things to help manage stress:   Do something active. Exercise or activity can help reduce stress. Walking is a great way to get started. Even everyday activities such as housecleaning or yard work can help.  Try yoga or leanna chi. These techniques combine exercise and meditation. You may need some training at first to learn them.  Do something you enjoy. For example,

## 2024-04-04 NOTE — PROGRESS NOTES
Chief Complaint   Patient presents with    Medicare AWV     Patient is fasting.    Arm Pain     C/o Intermittent pain of right upper arm, limited ROM, and cannot lift anything heavy with it; worsening over the past month.    Fatigue     C/o Chronic fatigue.    Other     Inhalers are too expensive, so she has not been able to take them.         1. \"Have you been to the ER, urgent care clinic since your last visit?  Hospitalized since your last visit?\"   yes     2. \"Have you seen or consulted any other health care providers outside of the [facility] since your last visit?\"  yes         3. For patients aged 45-75: Has the patient had a colonoscopy / FIT/ Cologuard? UTD.      If the patient is female:    4.For patients aged 40-74: Has the patient had a mammogram within the past 2 years? Mammogram Result (most recent):  [facility] [place] TAMMI DIGITAL SCREEN BILATERAL 12/27/2023    Narrative  STUDY: Bilateral digital screening mammogram with 3-D tomosynthesis    INDICATION:  Screening.    COMPARISON: None    BREAST COMPOSITION: There are scattered areas of fibroglandular density.    FINDINGS: Bilateral digital screening mammography was performed and is  interpreted in conjunction with a computer assisted detection (CAD) system.  Additionally, tomosynthesis of both breasts in the CC and MLO projections was  performed. No suspicious masses or calcifications are identified.    Impression  BI-RADS 1: Negative. No mammographic evidence of malignancy.    RECOMMENDATIONS:  Next screening mammogram is recommended in one year.    The patient will be notified of these results.       5. For patients aged 60 and over last BMD study?: DEXA Result (most recent):  DEXA BONE DENSITY AXIAL SKELETON 12/27/2023    Narrative  Bone Mineral Density    Indication: Asymptomatic menopausal state.  Age: 68.  Sex: Female.    Menopause status: Postmenopausal..  Hormone replacement therapy: No.    Number of falls in the past year: 3.  Risk factors

## 2024-04-21 RX ORDER — INSULIN ASPART 100 [IU]/ML
INJECTION, SOLUTION INTRAVENOUS; SUBCUTANEOUS
Qty: 15 ML | Refills: 5 | Status: SHIPPED | OUTPATIENT
Start: 2024-04-21

## 2024-04-23 ENCOUNTER — OFFICE VISIT (OUTPATIENT)
Age: 69
End: 2024-04-23
Payer: MEDICARE

## 2024-04-23 VITALS
RESPIRATION RATE: 16 BRPM | HEIGHT: 64 IN | BODY MASS INDEX: 49.89 KG/M2 | WEIGHT: 292.2 LBS | HEART RATE: 71 BPM | DIASTOLIC BLOOD PRESSURE: 68 MMHG | OXYGEN SATURATION: 96 % | SYSTOLIC BLOOD PRESSURE: 120 MMHG

## 2024-04-23 DIAGNOSIS — R06.09 DYSPNEA ON EXERTION: Primary | ICD-10-CM

## 2024-04-23 DIAGNOSIS — E66.01 MORBID OBESITY (HCC): ICD-10-CM

## 2024-04-23 DIAGNOSIS — R00.2 PALPITATIONS: ICD-10-CM

## 2024-04-23 DIAGNOSIS — G47.33 OBSTRUCTIVE SLEEP APNEA: ICD-10-CM

## 2024-04-23 PROCEDURE — G8399 PT W/DXA RESULTS DOCUMENT: HCPCS | Performed by: STUDENT IN AN ORGANIZED HEALTH CARE EDUCATION/TRAINING PROGRAM

## 2024-04-23 PROCEDURE — 1036F TOBACCO NON-USER: CPT | Performed by: STUDENT IN AN ORGANIZED HEALTH CARE EDUCATION/TRAINING PROGRAM

## 2024-04-23 PROCEDURE — 3078F DIAST BP <80 MM HG: CPT | Performed by: STUDENT IN AN ORGANIZED HEALTH CARE EDUCATION/TRAINING PROGRAM

## 2024-04-23 PROCEDURE — 99214 OFFICE O/P EST MOD 30 MIN: CPT | Performed by: STUDENT IN AN ORGANIZED HEALTH CARE EDUCATION/TRAINING PROGRAM

## 2024-04-23 PROCEDURE — 1123F ACP DISCUSS/DSCN MKR DOCD: CPT | Performed by: STUDENT IN AN ORGANIZED HEALTH CARE EDUCATION/TRAINING PROGRAM

## 2024-04-23 PROCEDURE — 3017F COLORECTAL CA SCREEN DOC REV: CPT | Performed by: STUDENT IN AN ORGANIZED HEALTH CARE EDUCATION/TRAINING PROGRAM

## 2024-04-23 PROCEDURE — G8417 CALC BMI ABV UP PARAM F/U: HCPCS | Performed by: STUDENT IN AN ORGANIZED HEALTH CARE EDUCATION/TRAINING PROGRAM

## 2024-04-23 PROCEDURE — 1090F PRES/ABSN URINE INCON ASSESS: CPT | Performed by: STUDENT IN AN ORGANIZED HEALTH CARE EDUCATION/TRAINING PROGRAM

## 2024-04-23 PROCEDURE — G8427 DOCREV CUR MEDS BY ELIG CLIN: HCPCS | Performed by: STUDENT IN AN ORGANIZED HEALTH CARE EDUCATION/TRAINING PROGRAM

## 2024-04-23 PROCEDURE — 3074F SYST BP LT 130 MM HG: CPT | Performed by: STUDENT IN AN ORGANIZED HEALTH CARE EDUCATION/TRAINING PROGRAM

## 2024-04-23 NOTE — PROGRESS NOTES
had concerns including Chest Pain, Palpitations, and Shortness of Breath.    Vitals:    04/23/24 1006   BP: 120/68   Site: Left Upper Arm   Position: Sitting   Pulse: 71   Resp: 16   SpO2: 96%   Weight: 132.5 kg (292 lb 3.2 oz)   Height: 1.626 m (5' 4\")        Chest pain No    Refills No        1. Have you been to the ER, urgent care clinic since your last visit? No       Hospitalized since your last visit? No       Where?        Date?  
Max heart rate was 133 bpm on Day 4 / 06:52:25 pm      SVE(s): [place] was 0.03 %, 470 total SVE(s)      SVT (AT, RT): 5 events, longest event 8 beats on Day 12 / 06:36:31 am, fastest event 138 bpm on Day 2 / 12:22:44am      PVC(s): [place] was 0.01 %, 164 total PVC(s), 2 disparate morphologies     Ventricular Tachycardia: 1 events, longest event 3 beats at Day 6 / 12:38:26 am, fastest event 86 bpm at Day 6 /12:38:26 am      Patient recorded 5 events during the monitoring period              ATTENTION:   This medical record was transcribed using an electronic medical records/speech recognition system.  Although proofread, it may and can contain electronic, spelling and other errors.  Corrections may be executed at a later time.  Please feel free to contact us for any clarifications as needed.

## 2024-05-07 ENCOUNTER — NURSE ONLY (OUTPATIENT)
Age: 69
End: 2024-05-07

## 2024-05-07 DIAGNOSIS — I10 ESSENTIAL (PRIMARY) HYPERTENSION: ICD-10-CM

## 2024-05-07 DIAGNOSIS — E11.65 TYPE 2 DIABETES MELLITUS WITH HYPERGLYCEMIA, WITH LONG-TERM CURRENT USE OF INSULIN (HCC): ICD-10-CM

## 2024-05-07 DIAGNOSIS — E78.2 MIXED HYPERLIPIDEMIA: ICD-10-CM

## 2024-05-07 DIAGNOSIS — Z79.4 TYPE 2 DIABETES MELLITUS WITH HYPERGLYCEMIA, WITH LONG-TERM CURRENT USE OF INSULIN (HCC): ICD-10-CM

## 2024-05-08 LAB
ALBUMIN SERPL-MCNC: 3.3 G/DL (ref 3.5–5)
ALBUMIN/GLOB SERPL: 0.9 (ref 1.1–2.2)
ALP SERPL-CCNC: 118 U/L (ref 45–117)
ALT SERPL-CCNC: 17 U/L (ref 12–78)
ANION GAP SERPL CALC-SCNC: 4 MMOL/L (ref 5–15)
AST SERPL-CCNC: 10 U/L (ref 15–37)
BILIRUB SERPL-MCNC: 0.7 MG/DL (ref 0.2–1)
BUN SERPL-MCNC: 19 MG/DL (ref 6–20)
BUN/CREAT SERPL: 19 (ref 12–20)
CALCIUM SERPL-MCNC: 9.2 MG/DL (ref 8.5–10.1)
CHLORIDE SERPL-SCNC: 109 MMOL/L (ref 97–108)
CHOLEST SERPL-MCNC: 178 MG/DL
CO2 SERPL-SCNC: 28 MMOL/L (ref 21–32)
CREAT SERPL-MCNC: 1.02 MG/DL (ref 0.55–1.02)
CREAT UR-MCNC: 293 MG/DL
EST. AVERAGE GLUCOSE BLD GHB EST-MCNC: 154 MG/DL
GLOBULIN SER CALC-MCNC: 3.7 G/DL (ref 2–4)
GLUCOSE SERPL-MCNC: 239 MG/DL (ref 65–100)
HBA1C MFR BLD: 7 % (ref 4–5.6)
HDLC SERPL-MCNC: 87 MG/DL
HDLC SERPL: 2 (ref 0–5)
LDLC SERPL CALC-MCNC: 78.6 MG/DL (ref 0–100)
MICROALBUMIN UR-MCNC: 1.31 MG/DL
MICROALBUMIN/CREAT UR-RTO: 4 MG/G (ref 0–30)
POTASSIUM SERPL-SCNC: 4.4 MMOL/L (ref 3.5–5.1)
PROT SERPL-MCNC: 7 G/DL (ref 6.4–8.2)
SODIUM SERPL-SCNC: 141 MMOL/L (ref 136–145)
TRIGL SERPL-MCNC: 62 MG/DL
VLDLC SERPL CALC-MCNC: 12.4 MG/DL

## 2024-05-15 ENCOUNTER — OFFICE VISIT (OUTPATIENT)
Age: 69
End: 2024-05-15
Payer: MEDICARE

## 2024-05-15 VITALS
RESPIRATION RATE: 16 BRPM | HEIGHT: 64 IN | BODY MASS INDEX: 48.42 KG/M2 | HEART RATE: 73 BPM | SYSTOLIC BLOOD PRESSURE: 135 MMHG | DIASTOLIC BLOOD PRESSURE: 75 MMHG | TEMPERATURE: 98.8 F | WEIGHT: 283.6 LBS | OXYGEN SATURATION: 95 %

## 2024-05-15 DIAGNOSIS — E55.9 VITAMIN D DEFICIENCY, UNSPECIFIED: ICD-10-CM

## 2024-05-15 DIAGNOSIS — E78.2 MIXED HYPERLIPIDEMIA: ICD-10-CM

## 2024-05-15 DIAGNOSIS — Z79.4 TYPE 2 DIABETES MELLITUS WITH HYPERGLYCEMIA, WITH LONG-TERM CURRENT USE OF INSULIN (HCC): Primary | ICD-10-CM

## 2024-05-15 DIAGNOSIS — Z79.4 ENCOUNTER FOR LONG-TERM (CURRENT) USE OF INSULIN (HCC): ICD-10-CM

## 2024-05-15 DIAGNOSIS — E11.65 TYPE 2 DIABETES MELLITUS WITH HYPERGLYCEMIA, WITH LONG-TERM CURRENT USE OF INSULIN (HCC): Primary | ICD-10-CM

## 2024-05-15 DIAGNOSIS — I10 ESSENTIAL (PRIMARY) HYPERTENSION: ICD-10-CM

## 2024-05-15 PROCEDURE — 1090F PRES/ABSN URINE INCON ASSESS: CPT | Performed by: INTERNAL MEDICINE

## 2024-05-15 PROCEDURE — 99214 OFFICE O/P EST MOD 30 MIN: CPT | Performed by: INTERNAL MEDICINE

## 2024-05-15 PROCEDURE — 1036F TOBACCO NON-USER: CPT | Performed by: INTERNAL MEDICINE

## 2024-05-15 PROCEDURE — 3017F COLORECTAL CA SCREEN DOC REV: CPT | Performed by: INTERNAL MEDICINE

## 2024-05-15 PROCEDURE — 3075F SYST BP GE 130 - 139MM HG: CPT | Performed by: INTERNAL MEDICINE

## 2024-05-15 PROCEDURE — G8427 DOCREV CUR MEDS BY ELIG CLIN: HCPCS | Performed by: INTERNAL MEDICINE

## 2024-05-15 PROCEDURE — 3051F HG A1C>EQUAL 7.0%<8.0%: CPT | Performed by: INTERNAL MEDICINE

## 2024-05-15 PROCEDURE — 3078F DIAST BP <80 MM HG: CPT | Performed by: INTERNAL MEDICINE

## 2024-05-15 PROCEDURE — 2022F DILAT RTA XM EVC RTNOPTHY: CPT | Performed by: INTERNAL MEDICINE

## 2024-05-15 PROCEDURE — G8417 CALC BMI ABV UP PARAM F/U: HCPCS | Performed by: INTERNAL MEDICINE

## 2024-05-15 PROCEDURE — 1123F ACP DISCUSS/DSCN MKR DOCD: CPT | Performed by: INTERNAL MEDICINE

## 2024-05-15 PROCEDURE — G8399 PT W/DXA RESULTS DOCUMENT: HCPCS | Performed by: INTERNAL MEDICINE

## 2024-05-15 RX ORDER — INSULIN GLARGINE 300 U/ML
INJECTION, SOLUTION SUBCUTANEOUS
Qty: 15 ML | Refills: 5 | Status: SHIPPED | OUTPATIENT
Start: 2024-05-15

## 2024-05-15 ASSESSMENT — PATIENT HEALTH QUESTIONNAIRE - PHQ9
2. FEELING DOWN, DEPRESSED OR HOPELESS: NOT AT ALL
5. POOR APPETITE OR OVEREATING: NOT AT ALL
7. TROUBLE CONCENTRATING ON THINGS, SUCH AS READING THE NEWSPAPER OR WATCHING TELEVISION: NOT AT ALL
10. IF YOU CHECKED OFF ANY PROBLEMS, HOW DIFFICULT HAVE THESE PROBLEMS MADE IT FOR YOU TO DO YOUR WORK, TAKE CARE OF THINGS AT HOME, OR GET ALONG WITH OTHER PEOPLE: NOT DIFFICULT AT ALL
9. THOUGHTS THAT YOU WOULD BE BETTER OFF DEAD, OR OF HURTING YOURSELF: NOT AT ALL
1. LITTLE INTEREST OR PLEASURE IN DOING THINGS: NOT AT ALL
8. MOVING OR SPEAKING SO SLOWLY THAT OTHER PEOPLE COULD HAVE NOTICED. OR THE OPPOSITE, BEING SO FIGETY OR RESTLESS THAT YOU HAVE BEEN MOVING AROUND A LOT MORE THAN USUAL: NOT AT ALL
6. FEELING BAD ABOUT YOURSELF - OR THAT YOU ARE A FAILURE OR HAVE LET YOURSELF OR YOUR FAMILY DOWN: NOT AT ALL
3. TROUBLE FALLING OR STAYING ASLEEP: NOT AT ALL
SUM OF ALL RESPONSES TO PHQ QUESTIONS 1-9: 0
4. FEELING TIRED OR HAVING LITTLE ENERGY: NOT AT ALL
SUM OF ALL RESPONSES TO PHQ QUESTIONS 1-9: 0
SUM OF ALL RESPONSES TO PHQ QUESTIONS 1-9: 0
SUM OF ALL RESPONSES TO PHQ9 QUESTIONS 1 & 2: 0
SUM OF ALL RESPONSES TO PHQ QUESTIONS 1-9: 0

## 2024-05-15 NOTE — PROGRESS NOTES
Patient identified with two identification factors, Name and Date of Birth.    Chief Complaint   Patient presents with    Follow-up    Diabetes       /75 (Site: Left Lower Arm, Position: Sitting, Cuff Size: Large Adult)   Pulse 73   Temp 98.8 °F (37.1 °C) (Temporal)   Resp 16   Ht 1.626 m (5' 4\")   Wt 128.6 kg (283 lb 9.6 oz)   SpO2 95%   BMI 48.68 kg/m²       1. \"Have you been to the ER, urgent care clinic since your last visit?  Hospitalized since your last visit?\" No    2. \"Have you seen or consulted any other health care providers outside of the [facility] since your last visit?\" No

## 2024-05-15 NOTE — PROGRESS NOTES
[facility] DIABETES AND ENDOCRINOLOGY            Victoria Sims MD FACE     HISTORY OF PRESENT ILLNESS  Sandy Zelaya is a 69  y.o. female.  HPI  Patient here for f/u after last visit of Type 2 diabetes mellitus from  Nov 2023   Her medical status stayed stable since last visit     Compliant with insulins       Nov 2023     Gained 7 lbs   Trulicity is pricey      May 2023     Visited Gyn for vaginal infections   She has been taking farxiga   The prices are up on her new plan . It is true for trulicity       Old history :     Referred : by pcp- dr. Chaves  H/o diabetes for 12 years   Has seen Dr. Rico  Thus far   Current A1C is 11.1 % from March 2014  and symptoms/problems include none   Current diabetic medications include mix 50/50    30 units AM and 35 units PM and tradjenta .       Review of Systems   Constitutional: Negative.    Psychiatric/Behavioral:yes  depression and memory loss. The patient does not have insomnia.        Physical Exam   Constitutional: She is oriented to person, place, and time. She appears well-developed and well-nourished.   Psychiatric: She is saying she is depressed     Labs  Lab Results   Component Value Date    LABA1C 7.0 (H) 05/07/2024    LABA1C 7.4 (H) 11/09/2023    LABA1C 7.0 (H) 05/10/2023       Lab Results   Component Value Date     05/07/2024    K 4.4 05/07/2024     (H) 05/07/2024    CO2 28 05/07/2024    BUN 19 05/07/2024    CREATININE 1.02 05/07/2024    GLUCOSE 239 (H) 05/07/2024    CALCIUM 9.2 05/07/2024    BILITOT 0.7 05/07/2024    ALKPHOS 118 (H) 05/07/2024    AST 10 (L) 05/07/2024    ALT 17 05/07/2024    LABGLOM 60 (L) 05/07/2024    GFRAA >60 10/15/2021    AGRATIO 1.7 04/08/2022    GLOB 3.7 05/07/2024    CHOL 178 05/07/2024    TRIG 62 05/07/2024    LDL 78.6 05/07/2024    HDL 87 05/07/2024      Thyroid    Lab Results   Component Value Date    ZQV1VYT 1.50 11/09/2023    T4FREE 0.9 05/10/2023        ASSESSMENT and PLAN    1. Type 2 DM, uncontrolled : A1c is

## 2024-05-15 NOTE — PATIENT INSTRUCTIONS
SPECIFIC INSTRUCTIONS BELOW     crestor 5 mg at night          Check blood sugars immediately before each meal and at bedtime ( print and mail )     Optional    trulicity  3 mg once a week  because of cost       [facility]    50 units at night  ( [facility] RELION NPH insulin )     Novolog    insulin 8 units before breakfast, 8  units before lunch and   8   units before dinner.      Also, add additional novolog  as follows with meals  If blood sugars are::     150-200 mg 1 units     201-250 mg 3 units     251-300 mg 5 units     301-350 mg 7 units     351-400 mg 9 units     401-450 mg 11 units     451-500 mg  13 units     Less than 70 mg NO INSULIN           -------------PAY ATTENTION TO THESE GENERAL INSTRUCTIONS -----------------      - The medications prescribed at this visit will not be available at pharmacy until 6 pm       - YOUR MED LIST IS NOT UP TO DATE AS SOME CHANGES ARE BEING MADE AFTER THE VISIT - FOLLOW SPECIFIC INSTRUCTIONS  ABOVE     -ANY tests other than blood work, which you opt to do  outside the  [facility] imaging facilities, you are responsible for prior authorizations if  required    - HEALTH MAINTENANCE IS NOT GOING TO BE UP TO DATE ON YOUR AVS- PLEASE IGNORE     Results     *Normal results will not be notified by a phone call starting January 1 2021   *If you have an upcoming visit, the results will be discussed at the visit   *Please sign up for MY CHART if you want access to your lab and test results  *Abnormal results which require immediate attention will be notified by phone call   *Abnormal results which do not require immediate assistance will be notified in 1-2 weeks       Refills    -    have your pharmacy send us a refill request . Refills are done max for one year and a visit is a must before refills are extended    Follow up appointments -  highly encourage you to make it when you are checking out. We can accommodate you into the schedule based on your clinical situation,

## 2024-05-20 ASSESSMENT — SLEEP AND FATIGUE QUESTIONNAIRES
HOW LIKELY ARE YOU TO NOD OFF OR FALL ASLEEP IN A CAR, WHILE STOPPED FOR A FEW MINUTES IN TRAFFIC: WOULD NEVER DOZE
HOW LIKELY ARE YOU TO NOD OFF OR FALL ASLEEP WHILE WATCHING TV: MODERATE CHANCE OF DOZING
HOW LIKELY ARE YOU TO NOD OFF OR FALL ASLEEP WHEN YOU ARE A PASSENGER IN A CAR FOR AN HOUR WITHOUT A BREAK: MODERATE CHANCE OF DOZING
SELECT ANY OF THE FOLLOWING BEHAVIORS OBSERVED WHILE PATIENT ASLEEP: LIGHT SNORING;TWITCHING OF LEGS OR FEET;PAUSES IN BREATHING
HOW LIKELY ARE YOU TO NOD OFF OR FALL ASLEEP WHILE SITTING INACTIVE IN A PUBLIC PLACE: SLIGHT CHANCE OF DOZING
HOW LIKELY ARE YOU TO NOD OFF OR FALL ASLEEP WHILE SITTING AND READING: MODERATE CHANCE OF DOZING
ARE YOU BOTHERED BY WAKING UP TOO EARLY AND NOT BEING ABLE TO GET BACK TO SLEEP: YES
HOW LIKELY ARE YOU TO NOD OFF OR FALL ASLEEP WHILE SITTING QUIETLY AFTER LUNCH WITHOUT ALCOHOL: WOULD NEVER DOZE
ESS TOTAL SCORE: 10
HOW LIKELY ARE YOU TO NOD OFF OR FALL ASLEEP WHILE SITTING AND TALKING TO SOMEONE: WOULD NEVER DOZE
DO YOU GET TOO LITTLE SLEEP AT NIGHT: NO
AVERAGE NUMBER OF SLEEP HOURS: 7

## 2024-06-12 ENCOUNTER — OFFICE VISIT (OUTPATIENT)
Age: 69
End: 2024-06-12
Payer: MEDICARE

## 2024-06-12 VITALS
HEIGHT: 64 IN | SYSTOLIC BLOOD PRESSURE: 126 MMHG | DIASTOLIC BLOOD PRESSURE: 71 MMHG | HEART RATE: 70 BPM | TEMPERATURE: 98.6 F | OXYGEN SATURATION: 97 % | BODY MASS INDEX: 49.61 KG/M2 | WEIGHT: 290.6 LBS | RESPIRATION RATE: 16 BRPM

## 2024-06-12 DIAGNOSIS — M05.79 RHEUMATOID ARTHRITIS INVOLVING MULTIPLE SITES WITH POSITIVE RHEUMATOID FACTOR (HCC): ICD-10-CM

## 2024-06-12 DIAGNOSIS — E53.8 VITAMIN B12 DEFICIENCY: ICD-10-CM

## 2024-06-12 DIAGNOSIS — Z79.4 TYPE 2 DIABETES MELLITUS WITH HYPERGLYCEMIA, WITH LONG-TERM CURRENT USE OF INSULIN (HCC): ICD-10-CM

## 2024-06-12 DIAGNOSIS — E78.2 MIXED HYPERLIPIDEMIA: ICD-10-CM

## 2024-06-12 DIAGNOSIS — E11.65 TYPE 2 DIABETES MELLITUS WITH HYPERGLYCEMIA, WITH LONG-TERM CURRENT USE OF INSULIN (HCC): ICD-10-CM

## 2024-06-12 DIAGNOSIS — I10 ESSENTIAL HYPERTENSION WITH GOAL BLOOD PRESSURE LESS THAN 130/80: ICD-10-CM

## 2024-06-12 DIAGNOSIS — L20.9 ATOPIC DERMATITIS, UNSPECIFIED TYPE: Primary | ICD-10-CM

## 2024-06-12 DIAGNOSIS — E55.9 VITAMIN D DEFICIENCY, UNSPECIFIED: ICD-10-CM

## 2024-06-12 DIAGNOSIS — H60.543 ECZEMA OF BOTH EXTERNAL EARS: ICD-10-CM

## 2024-06-12 PROCEDURE — 4130F TOPICAL PREP RX AOE: CPT | Performed by: INTERNAL MEDICINE

## 2024-06-12 PROCEDURE — 1036F TOBACCO NON-USER: CPT | Performed by: INTERNAL MEDICINE

## 2024-06-12 PROCEDURE — 99214 OFFICE O/P EST MOD 30 MIN: CPT | Performed by: INTERNAL MEDICINE

## 2024-06-12 PROCEDURE — G8427 DOCREV CUR MEDS BY ELIG CLIN: HCPCS | Performed by: INTERNAL MEDICINE

## 2024-06-12 PROCEDURE — 3078F DIAST BP <80 MM HG: CPT | Performed by: INTERNAL MEDICINE

## 2024-06-12 PROCEDURE — 2022F DILAT RTA XM EVC RTNOPTHY: CPT | Performed by: INTERNAL MEDICINE

## 2024-06-12 PROCEDURE — 1090F PRES/ABSN URINE INCON ASSESS: CPT | Performed by: INTERNAL MEDICINE

## 2024-06-12 PROCEDURE — 1123F ACP DISCUSS/DSCN MKR DOCD: CPT | Performed by: INTERNAL MEDICINE

## 2024-06-12 PROCEDURE — 3051F HG A1C>EQUAL 7.0%<8.0%: CPT | Performed by: INTERNAL MEDICINE

## 2024-06-12 PROCEDURE — G8399 PT W/DXA RESULTS DOCUMENT: HCPCS | Performed by: INTERNAL MEDICINE

## 2024-06-12 PROCEDURE — 3074F SYST BP LT 130 MM HG: CPT | Performed by: INTERNAL MEDICINE

## 2024-06-12 PROCEDURE — 3017F COLORECTAL CA SCREEN DOC REV: CPT | Performed by: INTERNAL MEDICINE

## 2024-06-12 PROCEDURE — G8417 CALC BMI ABV UP PARAM F/U: HCPCS | Performed by: INTERNAL MEDICINE

## 2024-06-12 RX ORDER — TRIAMCINOLONE ACETONIDE 1 MG/G
OINTMENT TOPICAL 2 TIMES DAILY
Qty: 15 G | Refills: 0 | Status: SHIPPED | OUTPATIENT
Start: 2024-06-12 | End: 2024-06-19

## 2024-06-12 ASSESSMENT — PATIENT HEALTH QUESTIONNAIRE - PHQ9
SUM OF ALL RESPONSES TO PHQ QUESTIONS 1-9: 0
2. FEELING DOWN, DEPRESSED OR HOPELESS: NOT AT ALL
SUM OF ALL RESPONSES TO PHQ9 QUESTIONS 1 & 2: 0
7. TROUBLE CONCENTRATING ON THINGS, SUCH AS READING THE NEWSPAPER OR WATCHING TELEVISION: NOT AT ALL
SUM OF ALL RESPONSES TO PHQ QUESTIONS 1-9: 0
3. TROUBLE FALLING OR STAYING ASLEEP: NOT AT ALL
SUM OF ALL RESPONSES TO PHQ QUESTIONS 1-9: 0
SUM OF ALL RESPONSES TO PHQ9 QUESTIONS 1 & 2: 0
SUM OF ALL RESPONSES TO PHQ QUESTIONS 1-9: 0
5. POOR APPETITE OR OVEREATING: NOT AT ALL
8. MOVING OR SPEAKING SO SLOWLY THAT OTHER PEOPLE COULD HAVE NOTICED. OR THE OPPOSITE, BEING SO FIGETY OR RESTLESS THAT YOU HAVE BEEN MOVING AROUND A LOT MORE THAN USUAL: NOT AT ALL
SUM OF ALL RESPONSES TO PHQ QUESTIONS 1-9: 0
6. FEELING BAD ABOUT YOURSELF - OR THAT YOU ARE A FAILURE OR HAVE LET YOURSELF OR YOUR FAMILY DOWN: NOT AT ALL
4. FEELING TIRED OR HAVING LITTLE ENERGY: NOT AT ALL
1. LITTLE INTEREST OR PLEASURE IN DOING THINGS: NOT AT ALL
10. IF YOU CHECKED OFF ANY PROBLEMS, HOW DIFFICULT HAVE THESE PROBLEMS MADE IT FOR YOU TO DO YOUR WORK, TAKE CARE OF THINGS AT HOME, OR GET ALONG WITH OTHER PEOPLE: NOT DIFFICULT AT ALL
2. FEELING DOWN, DEPRESSED OR HOPELESS: NOT AT ALL
9. THOUGHTS THAT YOU WOULD BE BETTER OFF DEAD, OR OF HURTING YOURSELF: NOT AT ALL
1. LITTLE INTEREST OR PLEASURE IN DOING THINGS: NOT AT ALL
SUM OF ALL RESPONSES TO PHQ QUESTIONS 1-9: 0

## 2024-06-12 ASSESSMENT — ENCOUNTER SYMPTOMS
RHINORRHEA: 0
COLOR CHANGE: 0
SHORTNESS OF BREATH: 0
CHEST TIGHTNESS: 0
WHEEZING: 0
FACIAL SWELLING: 0
VOMITING: 0
SORE THROAT: 0
ABDOMINAL PAIN: 0
COUGH: 0
NAUSEA: 0

## 2024-06-12 NOTE — PROGRESS NOTES
Chief Complaint   Patient presents with    Results     Second opinion about liver function    Otalgia     Irritation in both ears for a couple of weeks.     \"Have you been to the ER, urgent care clinic since your last visit?  Hospitalized since your last visit?\"    NO    “Have you seen or consulted any other health care providers outside of [facility] since your last visit?”    Patient saw Dr. Galloway today, 06.12,2024  at [facility] in [place].        Click Here for Release of Records Request             6/12/2024     2:58 PM   PHQ-9    Little interest or pleasure in doing things 0   Feeling down, depressed, or hopeless 0   PHQ-2 Score 0   PHQ-9 Total Score 0           Financial Resource Strain: High Risk (5/23/2024)    Overall Financial Resource Strain (CARDIA)     Difficulty of Paying Living Expenses: Very hard      Food Insecurity: No Food Insecurity (5/23/2024)    Hunger Vital Sign     Worried About Running Out of Food in the Last Year: Never true     Ran Out of Food in the Last Year: Never true          Health Maintenance Due   Topic Date Due    COVID-19 Vaccine (1) Never done    Hepatitis C screen  Never done    DTaP/Tdap/Td vaccine (1 - Tdap) Never done    Shingles vaccine (1 of 2) Never done    Respiratory Syncytial Virus (RSV) Pregnant or age 60 yrs+ (1 - 1-dose 60+ series) Never done    Diabetic retinal exam  04/07/2016    Diabetic foot exam  05/12/2023

## 2024-06-12 NOTE — PROGRESS NOTES
Sandy Zelaya (:  1955) is a 69 y.o. female,Established patient, here for evaluation of the following chief complaint(s):   Chief Complaint   Patient presents with    Results     Second opinion about liver function    Otalgia     Irritation in both ears for a couple of weeks.       HPI   Subjective   SUBJECTIVE/OBJECTIVE  HPI : Patient is here for follow-up recent labs done by her endocrinologist.  Patient states she was told by her Endo that her liver enzymes are too low and she needs to get them looked at with a gastroenterologist or her PCP.  Patient was very upset.  Also states she has had dry skin irritation with itching over the earlobes.  Would like to get a cream for the same.    Past Medical History:   Diagnosis Date    Asthma     DM (diabetes mellitus) ([facility])     Encounter for long-term (current) use of insulin ([facility]) 2016    History of pneumonia     HTN (hypertension)     Hypothyroidism     Mixed anxiety and depressive disorder 2023    Mixed hyperlipidemia 2016    Osteoarthritis     Rheumatoid arthritis involving multiple sites with positive rheumatoid factor ([facility]) 2023    Vitamin D deficiency         Medications     Current Outpatient Medications:     triamcinolone (KENALOG) 0.1 % ointment, Apply topically 2 times daily for 7 days, Disp: 15 g, Rfl: 0    Insulin Glargine, 2 Unit Dial, (TOUJEO MAX SOLOSTAR) 300 UNIT/ML SOPN, 50 units at bed time, Disp: 15 mL, Rfl: 5    insulin aspart (NOVOLOG FLEXPEN) 100 UNIT/ML injection pen, INJECT 8 UNITS BEFORE BREAKFAST AND LUNCH AND 10 UNITS BEFORE DINNER PLUS SLIDING SCALE. MAX OF 65UNITS DAILY, Disp: 15 mL, Rfl: 5    rosuvastatin (CRESTOR) 5 MG tablet, Take 1 tablet by mouth every evening, Disp: 90 tablet, Rfl: 1    diclofenac sodium (VOLTAREN) 1 % GEL, Apply 2 g topically 4 times daily, Disp: 100 g, Rfl: 2    meloxicam (MOBIC) 15 MG tablet, Take 1 tablet by mouth daily, Disp: , Rfl:     gabapentin (NEURONTIN) 100 MG capsule, Take

## 2024-06-13 DIAGNOSIS — I89.0 LYMPHEDEMA: ICD-10-CM

## 2024-06-13 RX ORDER — FUROSEMIDE 20 MG/1
20 TABLET ORAL DAILY
Qty: 90 TABLET | Refills: 1 | Status: SHIPPED | OUTPATIENT
Start: 2024-06-13

## 2024-06-13 NOTE — TELEPHONE ENCOUNTER
PCP: Kasia Porras MD    Last appt: 6/12/2024     Future Appointments   Date Time Provider Department Center   8/15/2024 11:20 AM Jefferson Lee MD [facility]   9/30/2024  8:15 AM LAB ONLY [facility]   10/7/2024  8:20 AM Kasia Porras MD [facility]   10/8/2024 11:20 AM [facility]       Requested Prescriptions     Pending Prescriptions Disp Refills    furosemide (LASIX) 20 MG tablet [Pharmacy Med Name: FUROSEMIDE 20MG TABLETS] 90 tablet 1     Sig: TAKE 1 TABLET BY MOUTH DAILY       Prior labs and Blood pressures:  BP Readings from Last 3 Encounters:   06/12/24 126/71   05/15/24 135/75   04/23/24 120/68     Lab Results   Component Value Date/Time     05/07/2024 01:32 PM    K 4.4 05/07/2024 01:32 PM     05/07/2024 01:32 PM    CO2 28 05/07/2024 01:32 PM    BUN 19 05/07/2024 01:32 PM    GFRAA >60 10/15/2021 09:47 AM     No results found for: \"HBA1C\", \"ACC1GPGH\"  Lab Results   Component Value Date/Time    CHOL 178 05/07/2024 01:32 PM    HDL 87 05/07/2024 01:32 PM    LDL 78.6 05/07/2024 01:32 PM    LDL 74.2 11/09/2023 01:33 PM    VLDL 12.4 05/07/2024 01:32 PM    VLDL 9 04/08/2022 12:00 AM     No results found for: \"VITD3\"    No results found for: \"TSH\", \"TSH2\", \"TSH3\"

## 2024-07-16 DIAGNOSIS — Z79.4 TYPE 2 DIABETES MELLITUS WITH HYPERGLYCEMIA, WITH LONG-TERM CURRENT USE OF INSULIN (HCC): Primary | ICD-10-CM

## 2024-07-16 DIAGNOSIS — E11.65 TYPE 2 DIABETES MELLITUS WITH HYPERGLYCEMIA, WITH LONG-TERM CURRENT USE OF INSULIN (HCC): Primary | ICD-10-CM

## 2024-07-17 RX ORDER — PEN NEEDLE, DIABETIC 32GX 5/32"
NEEDLE, DISPOSABLE MISCELLANEOUS
Qty: 400 EACH | Refills: 3 | Status: SHIPPED | OUTPATIENT
Start: 2024-07-17

## 2024-08-12 ASSESSMENT — SLEEP AND FATIGUE QUESTIONNAIRES
HOW LIKELY ARE YOU TO NOD OFF OR FALL ASLEEP WHILE WATCHING TV: MODERATE CHANCE OF DOZING
DO YOU GENERALLY HAVE DIFFICULTY REMEMBERING THINGS BECAUSE YOU ARE SLEEPY OR TIRED: NO
DO YOU TAKE NAPS: NO
SELECT ANY OF THE FOLLOWING BEHAVIORS OBSERVED WHILE YOU ARE ASLEEP: PAUSES IN BREATHING
DO YOU WORK SHIFTS: NO
ESS TOTAL SCORE: 10
HAS YOUR MOOD BEEN AFFECTED BECAUSE YOU ARE SLEEPY OR TIRED: NO
ARE YOU BOTHERED BY WAKING UP TOO EARLY AND NOT BEING ABLE TO GET BACK TO SLEEP: YES
DO YOU HAVE DIFFICULTY BEING AS ACTIVE AS YOU WANT TO BE IN THE EVENING BECAUSE YOU ARE SLEEPY OR TIRED: NO
DO YOU GET TOO LITTLE SLEEP AT NIGHT: NO
HOW LIKELY ARE YOU TO NOD OFF OR FALL ASLEEP WHILE SITTING QUIETLY AFTER LUNCH WITHOUT ALCOHOL: WOULD NEVER DOZE
SELECT ANY OF THE FOLLOWING BEHAVIORS OBSERVED WHILE PATIENT ASLEEP: LIGHT SNORING;TWITCHING OF LEGS OR FEET;PAUSES IN BREATHING
ARE YOU BOTHERED BY WAKING UP TOO EARLY AND NOT BEING ABLE TO GET BACK TO SLEEP: YES
HOW LIKELY ARE YOU TO NOD OFF OR FALL ASLEEP WHILE WATCHING TV: MODERATE CHANCE OF DOZING
NUMBER OF TIMES YOU WAKE PER NIGHT: 3
AVERAGE NUMBER OF SLEEP HOURS: 7
HOW LIKELY ARE YOU TO NOD OFF OR FALL ASLEEP WHILE LYING DOWN TO REST IN THE AFTERNOON WHEN CIRCUMSTANCES PERMIT: HIGH CHANCE OF DOZING
HOW LIKELY ARE YOU TO NOD OFF OR FALL ASLEEP WHILE LYING DOWN TO REST IN THE AFTERNOON WHEN CIRCUMSTANCES PERMIT: HIGH CHANCE OF DOZING
WHAT TIME DO YOU USUALLY GO TO BED: 00:00
DO YOU HAVE DIFFICULTY VISITING YOUR FAMILY OR FRIENDS IN THEIR HOME BECAUSE YOU BECOME SLEEPY OR TIRED: NO
DO YOU GET TOO LITTLE SLEEP AT NIGHT: NO
AVERAGE NUMBER OF SLEEP HOURS: 7
DO YOU HAVE DIFFICULTY WATCHING A MOVIE OR VIDEO BECAUSE YOU BECOME SLEEPY OR TIRED: YES, A LITTLE
HOW LIKELY ARE YOU TO NOD OFF OR FALL ASLEEP WHILE SITTING QUIETLY AFTER LUNCH WITHOUT ALCOHOL: WOULD NEVER DOZE
DO YOU HAVE DIFFICULTY BEING AS ACTIVE AS YOU WANT TO BE IN THE MORNING BECAUSE YOU ARE SLEEPY OR TIRED: NO
FOSQ SCORE: 19.5
HOW LIKELY ARE YOU TO NOD OFF OR FALL ASLEEP WHILE SITTING AND TALKING TO SOMEONE: WOULD NEVER DOZE
DO YOU HAVE DIFFICULTY OPERATING A MOTOR VEHICLE FOR SHORT DISTANCES (LESS THAN 100 MILES) BECAUSE YOU BECOME SLEEPY: NO
HOW LIKELY ARE YOU TO NOD OFF OR FALL ASLEEP WHILE SITTING INACTIVE IN A PUBLIC PLACE: SLIGHT CHANCE OF DOZING
HOW LIKELY ARE YOU TO NOD OFF OR FALL ASLEEP WHILE SITTING AND READING: MODERATE CHANCE OF DOZING
HOW LIKELY ARE YOU TO NOD OFF OR FALL ASLEEP IN A CAR, WHILE STOPPED FOR A FEW MINUTES IN TRAFFIC: WOULD NEVER DOZE
SELECT ANY OF THE FOLLOWING BEHAVIORS OBSERVED WHILE YOU ARE ASLEEP: TWITCHING OF LEGS OR FEET
HOW LIKELY ARE YOU TO NOD OFF OR FALL ASLEEP WHEN YOU ARE A PASSENGER IN A CAR FOR AN HOUR WITHOUT A BREAK: MODERATE CHANCE OF DOZING
HOW LIKELY ARE YOU TO NOD OFF OR FALL ASLEEP WHILE SITTING AND TALKING TO SOMEONE: WOULD NEVER DOZE
HOW LIKELY ARE YOU TO NOD OFF OR FALL ASLEEP IN A CAR, WHILE STOPPED FOR A FEW MINUTES IN TRAFFIC: WOULD NEVER DOZE
DO YOU HAVE PROBLEMS WITH FREQUENT AWAKENINGS AT NIGHT: NO
DO YOU HAVE DIFFICULTY CONCENTRATING ON THE THINGS YOU DO BECAUSE YOU ARE SLEEPY OR TIRED: NO
HOW LIKELY ARE YOU TO NOD OFF OR FALL ASLEEP WHILE SITTING AND READING: MODERATE CHANCE OF DOZING
HAS YOUR RELATIONSHIP WITH FAMILY, FRIENDS OR WORK COLLEAGUES BEEN AFFECTED BECAUSE YOU ARE SLEEPY OR TIRED: NO
HOW LIKELY ARE YOU TO NOD OFF OR FALL ASLEEP WHEN YOU ARE A PASSENGER IN A CAR FOR AN HOUR WITHOUT A BREAK: MODERATE CHANCE OF DOZING
DO YOU HAVE DIFFICULTY OPERATING A MOTOR VEHICLE FOR LONG DISTANCES (GREATER THAN 100 MILES) BECAUSE YOU BECOME SLEEPY: NO
SELECT ANY OF THE FOLLOWING BEHAVIORS OBSERVED WHILE YOU ARE ASLEEP: LIGHT SNORING

## 2024-08-15 ENCOUNTER — TELEMEDICINE (OUTPATIENT)
Age: 69
End: 2024-08-15
Payer: MEDICARE

## 2024-08-15 DIAGNOSIS — G47.33 OSA (OBSTRUCTIVE SLEEP APNEA): Primary | ICD-10-CM

## 2024-08-15 PROCEDURE — 3017F COLORECTAL CA SCREEN DOC REV: CPT | Performed by: SPECIALIST

## 2024-08-15 PROCEDURE — 1090F PRES/ABSN URINE INCON ASSESS: CPT | Performed by: SPECIALIST

## 2024-08-15 PROCEDURE — 99203 OFFICE O/P NEW LOW 30 MIN: CPT | Performed by: SPECIALIST

## 2024-08-15 PROCEDURE — 1123F ACP DISCUSS/DSCN MKR DOCD: CPT | Performed by: SPECIALIST

## 2024-08-15 PROCEDURE — G8427 DOCREV CUR MEDS BY ELIG CLIN: HCPCS | Performed by: SPECIALIST

## 2024-08-15 PROCEDURE — G8399 PT W/DXA RESULTS DOCUMENT: HCPCS | Performed by: SPECIALIST

## 2024-08-15 NOTE — PROGRESS NOTES
MCG (5000 UT) TABS, Take by mouth daily, Disp: , Rfl:     Upadacitinib ER (RINVOQ) 15 MG TB24, daily, Disp: , Rfl:     clotrimazole-betamethasone (LOTRISONE) 1-0.05 % cream, APPLY EXTERNALLY TO THE AFFECTED AREA TWICE DAILY, Disp: , Rfl:     sertraline (ZOLOFT) 100 MG tablet, Take 1 tablet by mouth daily, Disp: , Rfl:     acetaminophen (TYLENOL) 325 MG tablet, Take 625 mg by mouth every 4 hours as needed, Disp: , Rfl:      She  has a past medical history of Asthma, DM (diabetes mellitus) ([facility]), Encounter for long-term (current) use of insulin ([facility]), History of pneumonia, HTN (hypertension), Hypothyroidism, Mixed anxiety and depressive disorder, Mixed hyperlipidemia, Osteoarthritis, Rheumatoid arthritis involving multiple sites with positive rheumatoid factor ([facility]), and Vitamin D deficiency.    She  has a past surgical history that includes Carpal tunnel release; Dilation and curettage of uterus (2011); Tubal ligation (6/1985); Endometrial biopsy (2012); Cataract extraction (Left, 12/11/2019); Colonoscopy (09/01/2016); Cardiac procedure (N/A, 3/21/2024); and invasive vascular (N/A, 3/21/2024).    She family history includes Cancer in her father; Dementia in her mother; Diabetes in her brother; Heart Disease in her sister; Hypertension in her brother and sister; Stroke in her mother.    She  reports that she has never smoked. She has never used smokeless tobacco. She reports that she does not drink alcohol and does not use drugs.     Review of Systems:  Review of Systems        Objective:   There were no vitals taken for this visit.  There is no height or weight on file to calculate BMI.    General:   Conversant, cooperative       Oropharynx:   Mallampati score III,                       Neuro:  Speech fluent, face symmetrical, tongue movement normal   Psych:  Normal affect,  normal countenance        Assessment:   1. LOUIE (obstructive sleep apnea)  -     SLEEP STUDY FULL; Future     History consistent with sleep

## 2024-08-26 ENCOUNTER — OFFICE VISIT (OUTPATIENT)
Age: 69
End: 2024-08-26

## 2024-08-26 VITALS
DIASTOLIC BLOOD PRESSURE: 62 MMHG | BODY MASS INDEX: 48.32 KG/M2 | TEMPERATURE: 98.6 F | HEART RATE: 64 BPM | WEIGHT: 283 LBS | SYSTOLIC BLOOD PRESSURE: 124 MMHG | RESPIRATION RATE: 17 BRPM | HEIGHT: 64 IN | OXYGEN SATURATION: 96 %

## 2024-08-26 DIAGNOSIS — R68.89 FLU-LIKE SYMPTOMS: Primary | ICD-10-CM

## 2024-08-26 DIAGNOSIS — N30.01 ACUTE CYSTITIS WITH HEMATURIA: ICD-10-CM

## 2024-08-26 DIAGNOSIS — R39.9 LOWER URINARY TRACT SYMPTOMS: ICD-10-CM

## 2024-08-26 DIAGNOSIS — U07.1 COVID: ICD-10-CM

## 2024-08-26 LAB
BILIRUBIN, URINE, POC: NORMAL
BLOOD URINE, POC: NORMAL
GLUCOSE URINE, POC: NEGATIVE
KETONES, URINE, POC: NORMAL
LEUKOCYTE ESTERASE, URINE, POC: NORMAL
Lab: ABNORMAL
NITRITE, URINE, POC: NEGATIVE
PERFORMING INSTRUMENT: ABNORMAL
PH, URINE, POC: 5.5 (ref 4.6–8)
PROTEIN,URINE, POC: NORMAL
QC PASS/FAIL: ABNORMAL
SARS-COV-2, POC: DETECTED
SPECIFIC GRAVITY, URINE, POC: >=1.03 (ref 1–1.03)
URINALYSIS CLARITY, POC: NORMAL
URINALYSIS COLOR, POC: NORMAL
UROBILINOGEN, POC: NORMAL

## 2024-08-26 RX ORDER — NITROFURANTOIN 25; 75 MG/1; MG/1
100 CAPSULE ORAL 2 TIMES DAILY
Qty: 20 CAPSULE | Refills: 0 | Status: SHIPPED | OUTPATIENT
Start: 2024-08-26 | End: 2024-09-05

## 2024-08-26 RX ORDER — GUAIFENESIN 200 MG/10ML
200 LIQUID ORAL 3 TIMES DAILY PRN
Qty: 210 ML | Refills: 0 | Status: SHIPPED | OUTPATIENT
Start: 2024-08-26 | End: 2024-09-02

## 2024-08-26 ASSESSMENT — ENCOUNTER SYMPTOMS: COUGH: 1

## 2024-08-26 NOTE — PROGRESS NOTES
Result Value Ref Range    SARS-COV-2, POC Detected (A) Not Detected    Lot Number [id number]     QC Pass/Fail Pass     Performing Instrument BinaxNOW      Please follow up with your PCP in 7 days.  Take medication for cough as prescribed.   Take Tylenol or Ibuprofen for body aches and headache.  Take Paxlovid for COVID infection. This medication can shorten the length of time you have COVID and lessen severity of symptoms.   Get plenty of rest. Drink plenty of water. Eat nutritious foods.  Please call or return to clinic if no improvement or any worsening  Patient comfortable with plan         SUBJECTIVE/OBJECTIVE:    History provided by:  Patient   used: No    Generalized Body Aches  Associated symptoms: cough and headaches    Cough  Associated symptoms include headaches.   Headache  Urinary Frequency   Associated symptoms include frequency.         69 y.o. female presents with symptoms of COVID. She tested positive in the clinic today. She also has urinary complaints without CVA tenderness. She has a cough, nasal congestion, malaise, generalized weakness, and dysuria.         Vitals:    08/26/24 1216   BP: 124/62   Site: Right Upper Arm   Position: Sitting   Cuff Size: Large Adult   Pulse: 64   Resp: 17   Temp: 98.6 °F (37 °C)   TempSrc: Oral   SpO2: 96%   Weight: 128.4 kg (283 lb)   Height: 1.626 m (5' 4\")         Physical Exam  Vitals and nursing note reviewed.   Constitutional:       Appearance: Normal appearance.   HENT:      Head: Normocephalic and atraumatic.   Cardiovascular:      Rate and Rhythm: Normal rate.      Pulses: Normal pulses.   Pulmonary:      Effort: Pulmonary effort is normal.   Neurological:      General: No focal deficit present.      Mental Status: She is alert and oriented to person, place, and time.   Psychiatric:         Mood and Affect: Mood normal.         Behavior: Behavior normal.         We discussed when to utilize emergency services. Patient verbalized  understanding.    An electronic signature was used to authenticate this note.    Miguel Medrano, ABBIE - CNP

## 2024-08-28 LAB
BACTERIA SPEC CULT: NORMAL
CC UR VC: NORMAL
SERVICE CMNT-IMP: NORMAL

## 2024-08-30 NOTE — RESULT ENCOUNTER NOTE
Please call and let patient know she can finish her antibiotic and if she is still symptomatic to follow up with her PCP or Urology.

## 2024-09-12 ENCOUNTER — TELEPHONE (OUTPATIENT)
Age: 69
End: 2024-09-12

## 2024-09-12 NOTE — TELEPHONE ENCOUNTER
Patient called stated that she just getting over covid but she still have a lot of flim in chest and throat wants to know if something could be called in or do she need to make appointment.    Requesting a call back    Best call back # [phone]

## 2024-09-13 NOTE — TELEPHONE ENCOUNTER
C/o Thick mucus and a lot of congestion, cough, itchy ears, and some chest tightness. It has been 10 days since patient tested positive for covid. She was given paxlovid. Symptoms were severe. Patient was directed to urgent care as she was advised we had no availability. Patient has been taking mucinex and a cough syrup. Scheduled for appt Monday.    FYI

## 2024-09-16 ENCOUNTER — OFFICE VISIT (OUTPATIENT)
Age: 69
End: 2024-09-16
Payer: MEDICARE

## 2024-09-16 VITALS
TEMPERATURE: 97.5 F | BODY MASS INDEX: 47.8 KG/M2 | SYSTOLIC BLOOD PRESSURE: 100 MMHG | WEIGHT: 280 LBS | HEIGHT: 64 IN | OXYGEN SATURATION: 98 % | DIASTOLIC BLOOD PRESSURE: 62 MMHG | HEART RATE: 78 BPM

## 2024-09-16 DIAGNOSIS — R05.3 POST-COVID CHRONIC COUGH: ICD-10-CM

## 2024-09-16 DIAGNOSIS — U09.9 POST-COVID CHRONIC COUGH: ICD-10-CM

## 2024-09-16 DIAGNOSIS — J30.2 SEASONAL ALLERGIC RHINITIS, UNSPECIFIED TRIGGER: ICD-10-CM

## 2024-09-16 DIAGNOSIS — N30.90 CYSTITIS: ICD-10-CM

## 2024-09-16 DIAGNOSIS — F41.8 MIXED ANXIETY AND DEPRESSIVE DISORDER: ICD-10-CM

## 2024-09-16 DIAGNOSIS — J45.41 MODERATE PERSISTENT ASTHMA WITH ACUTE EXACERBATION: Primary | ICD-10-CM

## 2024-09-16 PROCEDURE — G8427 DOCREV CUR MEDS BY ELIG CLIN: HCPCS | Performed by: INTERNAL MEDICINE

## 2024-09-16 PROCEDURE — 3074F SYST BP LT 130 MM HG: CPT | Performed by: INTERNAL MEDICINE

## 2024-09-16 PROCEDURE — 3078F DIAST BP <80 MM HG: CPT | Performed by: INTERNAL MEDICINE

## 2024-09-16 PROCEDURE — 3017F COLORECTAL CA SCREEN DOC REV: CPT | Performed by: INTERNAL MEDICINE

## 2024-09-16 PROCEDURE — G8399 PT W/DXA RESULTS DOCUMENT: HCPCS | Performed by: INTERNAL MEDICINE

## 2024-09-16 PROCEDURE — 99214 OFFICE O/P EST MOD 30 MIN: CPT | Performed by: INTERNAL MEDICINE

## 2024-09-16 PROCEDURE — 1036F TOBACCO NON-USER: CPT | Performed by: INTERNAL MEDICINE

## 2024-09-16 PROCEDURE — G8417 CALC BMI ABV UP PARAM F/U: HCPCS | Performed by: INTERNAL MEDICINE

## 2024-09-16 PROCEDURE — 1123F ACP DISCUSS/DSCN MKR DOCD: CPT | Performed by: INTERNAL MEDICINE

## 2024-09-16 PROCEDURE — 1090F PRES/ABSN URINE INCON ASSESS: CPT | Performed by: INTERNAL MEDICINE

## 2024-09-16 RX ORDER — DOXYCYCLINE HYCLATE 100 MG
100 TABLET ORAL 2 TIMES DAILY
Qty: 20 TABLET | Refills: 0 | Status: SHIPPED | OUTPATIENT
Start: 2024-09-16 | End: 2024-09-26

## 2024-09-16 RX ORDER — SERTRALINE HYDROCHLORIDE 100 MG/1
100 TABLET, FILM COATED ORAL DAILY
Qty: 90 TABLET | Refills: 1 | Status: SHIPPED | OUTPATIENT
Start: 2024-09-16

## 2024-09-16 ASSESSMENT — ENCOUNTER SYMPTOMS
WHEEZING: 0
FACIAL SWELLING: 0
VOMITING: 0
COUGH: 1
SORE THROAT: 0
NAUSEA: 0
SINUS PRESSURE: 1
ABDOMINAL PAIN: 0
CHEST TIGHTNESS: 1
SHORTNESS OF BREATH: 0
RHINORRHEA: 1

## 2024-09-20 ENCOUNTER — OFFICE VISIT (OUTPATIENT)
Age: 69
End: 2024-09-20

## 2024-09-20 ENCOUNTER — HOSPITAL ENCOUNTER (EMERGENCY)
Facility: HOSPITAL | Age: 69
Discharge: HOME | End: 2024-09-20
Attending: EMERGENCY MEDICINE
Payer: MEDICARE

## 2024-09-20 VITALS
DIASTOLIC BLOOD PRESSURE: 58 MMHG | RESPIRATION RATE: 17 BRPM | HEART RATE: 78 BPM | HEIGHT: 64 IN | OXYGEN SATURATION: 95 % | BODY MASS INDEX: 48.32 KG/M2 | WEIGHT: 283 LBS | SYSTOLIC BLOOD PRESSURE: 133 MMHG

## 2024-09-20 VITALS
SYSTOLIC BLOOD PRESSURE: 119 MMHG | RESPIRATION RATE: 16 BRPM | DIASTOLIC BLOOD PRESSURE: 61 MMHG | OXYGEN SATURATION: 96 % | TEMPERATURE: 97.9 F | HEART RATE: 79 BPM

## 2024-09-20 DIAGNOSIS — T78.3XXA ANGIOEDEMA, INITIAL ENCOUNTER: Primary | ICD-10-CM

## 2024-09-20 PROCEDURE — 99283 EMERGENCY DEPT VISIT LOW MDM: CPT

## 2024-09-20 PROCEDURE — 6370000000 HC RX 637 (ALT 250 FOR IP): Performed by: PHYSICIAN ASSISTANT

## 2024-09-20 RX ORDER — PREDNISONE 20 MG/1
40 TABLET ORAL ONCE
Status: COMPLETED | OUTPATIENT
Start: 2024-09-20 | End: 2024-09-20

## 2024-09-20 RX ORDER — DIPHENHYDRAMINE HCL 25 MG
25 CAPSULE ORAL
Status: COMPLETED | OUTPATIENT
Start: 2024-09-20 | End: 2024-09-20

## 2024-09-20 RX ORDER — PREDNISONE 10 MG/1
TABLET ORAL
Qty: 10 TABLET | Refills: 0 | Status: SHIPPED | OUTPATIENT
Start: 2024-09-20

## 2024-09-20 RX ORDER — EPINEPHRINE 0.3 MG/.3ML
0.3 INJECTION SUBCUTANEOUS ONCE
Qty: 0.3 ML | Refills: 0 | Status: SHIPPED | OUTPATIENT
Start: 2024-09-20 | End: 2024-09-20

## 2024-09-20 RX ORDER — FAMOTIDINE 20 MG/1
20 TABLET, FILM COATED ORAL ONCE
Status: COMPLETED | OUTPATIENT
Start: 2024-09-20 | End: 2024-09-20

## 2024-09-20 RX ADMIN — FAMOTIDINE 20 MG: 20 TABLET, FILM COATED ORAL at 19:39

## 2024-09-20 RX ADMIN — PREDNISONE 40 MG: 20 TABLET ORAL at 19:40

## 2024-09-20 RX ADMIN — DIPHENHYDRAMINE HYDROCHLORIDE 25 MG: 25 CAPSULE ORAL at 19:39

## 2024-09-20 ASSESSMENT — PAIN DESCRIPTION - LOCATION: LOCATION: NECK

## 2024-09-20 ASSESSMENT — LIFESTYLE VARIABLES
HOW MANY STANDARD DRINKS CONTAINING ALCOHOL DO YOU HAVE ON A TYPICAL DAY: PATIENT DOES NOT DRINK
HOW OFTEN DO YOU HAVE A DRINK CONTAINING ALCOHOL: NEVER

## 2024-09-20 ASSESSMENT — PAIN DESCRIPTION - ORIENTATION: ORIENTATION: LEFT

## 2024-09-20 ASSESSMENT — PAIN SCALES - GENERAL: PAINLEVEL_OUTOF10: 4

## 2024-09-20 ASSESSMENT — ENCOUNTER SYMPTOMS: ALLERGIC REACTION: 1

## 2024-09-20 ASSESSMENT — PAIN - FUNCTIONAL ASSESSMENT: PAIN_FUNCTIONAL_ASSESSMENT: 0-10

## 2024-09-23 ENCOUNTER — TELEPHONE (OUTPATIENT)
Age: 69
End: 2024-09-23

## 2024-09-23 NOTE — TELEPHONE ENCOUNTER
Patient called stated she had allergic reaction to her lisinipril went to ER they stated it was the lisinipril told her to get in touch with her pcp..    Requesting a call

## 2024-09-24 ENCOUNTER — OFFICE VISIT (OUTPATIENT)
Age: 69
End: 2024-09-24
Payer: MEDICARE

## 2024-09-24 VITALS
DIASTOLIC BLOOD PRESSURE: 65 MMHG | OXYGEN SATURATION: 96 % | TEMPERATURE: 97.8 F | RESPIRATION RATE: 16 BRPM | WEIGHT: 283.2 LBS | HEART RATE: 61 BPM | HEIGHT: 64 IN | BODY MASS INDEX: 48.35 KG/M2 | SYSTOLIC BLOOD PRESSURE: 119 MMHG

## 2024-09-24 DIAGNOSIS — T46.4X5A ACE INHIBITOR-AGGRAVATED ANGIOEDEMA, INITIAL ENCOUNTER: Primary | ICD-10-CM

## 2024-09-24 DIAGNOSIS — T78.3XXA ACE INHIBITOR-AGGRAVATED ANGIOEDEMA, INITIAL ENCOUNTER: Primary | ICD-10-CM

## 2024-09-24 PROCEDURE — G8417 CALC BMI ABV UP PARAM F/U: HCPCS | Performed by: NURSE PRACTITIONER

## 2024-09-24 PROCEDURE — G8427 DOCREV CUR MEDS BY ELIG CLIN: HCPCS | Performed by: NURSE PRACTITIONER

## 2024-09-24 PROCEDURE — 1123F ACP DISCUSS/DSCN MKR DOCD: CPT | Performed by: NURSE PRACTITIONER

## 2024-09-24 PROCEDURE — 99213 OFFICE O/P EST LOW 20 MIN: CPT | Performed by: NURSE PRACTITIONER

## 2024-09-24 PROCEDURE — 3017F COLORECTAL CA SCREEN DOC REV: CPT | Performed by: NURSE PRACTITIONER

## 2024-09-24 PROCEDURE — 3074F SYST BP LT 130 MM HG: CPT | Performed by: NURSE PRACTITIONER

## 2024-09-24 PROCEDURE — 3078F DIAST BP <80 MM HG: CPT | Performed by: NURSE PRACTITIONER

## 2024-09-24 PROCEDURE — 1036F TOBACCO NON-USER: CPT | Performed by: NURSE PRACTITIONER

## 2024-09-24 PROCEDURE — 1090F PRES/ABSN URINE INCON ASSESS: CPT | Performed by: NURSE PRACTITIONER

## 2024-09-24 PROCEDURE — G8399 PT W/DXA RESULTS DOCUMENT: HCPCS | Performed by: NURSE PRACTITIONER

## 2024-09-24 ASSESSMENT — ENCOUNTER SYMPTOMS
SHORTNESS OF BREATH: 0
SINUS PAIN: 0
FACIAL SWELLING: 1
WHEEZING: 0
COUGH: 0
SORE THROAT: 0

## 2024-09-30 ENCOUNTER — LAB (OUTPATIENT)
Age: 69
End: 2024-09-30

## 2024-09-30 DIAGNOSIS — E55.9 VITAMIN D DEFICIENCY, UNSPECIFIED: ICD-10-CM

## 2024-09-30 DIAGNOSIS — I10 ESSENTIAL HYPERTENSION WITH GOAL BLOOD PRESSURE LESS THAN 130/80: ICD-10-CM

## 2024-09-30 DIAGNOSIS — E11.65 TYPE 2 DIABETES MELLITUS WITH HYPERGLYCEMIA, WITH LONG-TERM CURRENT USE OF INSULIN (HCC): ICD-10-CM

## 2024-09-30 DIAGNOSIS — E53.8 VITAMIN B12 DEFICIENCY: ICD-10-CM

## 2024-09-30 DIAGNOSIS — Z79.4 TYPE 2 DIABETES MELLITUS WITH HYPERGLYCEMIA, WITH LONG-TERM CURRENT USE OF INSULIN (HCC): ICD-10-CM

## 2024-09-30 DIAGNOSIS — E78.2 MIXED HYPERLIPIDEMIA: ICD-10-CM

## 2024-09-30 LAB
25(OH)D3 SERPL-MCNC: 11.1 NG/ML (ref 30–100)
ALBUMIN SERPL-MCNC: 3.6 G/DL (ref 3.5–5)
ALBUMIN/GLOB SERPL: 1 (ref 1.1–2.2)
ALP SERPL-CCNC: 124 U/L (ref 45–117)
ALT SERPL-CCNC: 14 U/L (ref 12–78)
ANION GAP SERPL CALC-SCNC: 6 MMOL/L (ref 2–12)
AST SERPL-CCNC: 9 U/L (ref 15–37)
BASOPHILS # BLD: 0.1 K/UL (ref 0–0.1)
BASOPHILS NFR BLD: 1 % (ref 0–1)
BILIRUB SERPL-MCNC: 0.9 MG/DL (ref 0.2–1)
BUN SERPL-MCNC: 17 MG/DL (ref 6–20)
BUN/CREAT SERPL: 20 (ref 12–20)
CALCIUM SERPL-MCNC: 9.2 MG/DL (ref 8.5–10.1)
CHLORIDE SERPL-SCNC: 108 MMOL/L (ref 97–108)
CHOLEST SERPL-MCNC: 192 MG/DL
CO2 SERPL-SCNC: 27 MMOL/L (ref 21–32)
CREAT SERPL-MCNC: 0.84 MG/DL (ref 0.55–1.02)
DIFFERENTIAL METHOD BLD: ABNORMAL
EOSINOPHIL # BLD: 0.2 K/UL (ref 0–0.4)
EOSINOPHIL NFR BLD: 3 % (ref 0–7)
ERYTHROCYTE [DISTWIDTH] IN BLOOD BY AUTOMATED COUNT: 16.7 % (ref 11.5–14.5)
EST. AVERAGE GLUCOSE BLD GHB EST-MCNC: 174 MG/DL
FOLATE SERPL-MCNC: 6.3 NG/ML (ref 5–21)
GLOBULIN SER CALC-MCNC: 3.6 G/DL (ref 2–4)
GLUCOSE SERPL-MCNC: 185 MG/DL (ref 65–100)
HBA1C MFR BLD: 7.7 % (ref 4–5.6)
HCT VFR BLD AUTO: 31 % (ref 35–47)
HDLC SERPL-MCNC: 90 MG/DL
HDLC SERPL: 2.1 (ref 0–5)
HGB BLD-MCNC: 10.8 G/DL (ref 11.5–16)
IMM GRANULOCYTES # BLD AUTO: 0 K/UL (ref 0–0.04)
IMM GRANULOCYTES NFR BLD AUTO: 0 % (ref 0–0.5)
LDLC SERPL CALC-MCNC: 90.8 MG/DL (ref 0–100)
LYMPHOCYTES # BLD: 2.7 K/UL (ref 0.8–3.5)
LYMPHOCYTES NFR BLD: 39 % (ref 12–49)
MCH RBC QN AUTO: 32.7 PG (ref 26–34)
MCHC RBC AUTO-ENTMCNC: 34.8 G/DL (ref 30–36.5)
MCV RBC AUTO: 93.9 FL (ref 80–99)
MONOCYTES # BLD: 0.6 K/UL (ref 0–1)
MONOCYTES NFR BLD: 9 % (ref 5–13)
NEUTS SEG # BLD: 3.3 K/UL (ref 1.8–8)
NEUTS SEG NFR BLD: 48 % (ref 32–75)
NRBC # BLD: 0 K/UL (ref 0–0.01)
NRBC BLD-RTO: 0 PER 100 WBC
PLATELET # BLD AUTO: 269 K/UL (ref 150–400)
PMV BLD AUTO: 11.4 FL (ref 8.9–12.9)
POTASSIUM SERPL-SCNC: 3.8 MMOL/L (ref 3.5–5.1)
PROT SERPL-MCNC: 7.2 G/DL (ref 6.4–8.2)
RBC # BLD AUTO: 3.3 M/UL (ref 3.8–5.2)
SODIUM SERPL-SCNC: 141 MMOL/L (ref 136–145)
T4 FREE SERPL-MCNC: 1 NG/DL (ref 0.8–1.5)
TRIGL SERPL-MCNC: 56 MG/DL
TSH SERPL DL<=0.05 MIU/L-ACNC: 1.32 UIU/ML (ref 0.36–3.74)
VIT B12 SERPL-MCNC: 446 PG/ML (ref 193–986)
VLDLC SERPL CALC-MCNC: 11.2 MG/DL
WBC # BLD AUTO: 6.8 K/UL (ref 3.6–11)

## 2024-10-07 ENCOUNTER — OFFICE VISIT (OUTPATIENT)
Age: 69
End: 2024-10-07
Payer: MEDICARE

## 2024-10-07 VITALS
WEIGHT: 283 LBS | BODY MASS INDEX: 48.32 KG/M2 | HEART RATE: 74 BPM | SYSTOLIC BLOOD PRESSURE: 106 MMHG | DIASTOLIC BLOOD PRESSURE: 65 MMHG | HEIGHT: 64 IN | OXYGEN SATURATION: 95 % | TEMPERATURE: 97.5 F

## 2024-10-07 DIAGNOSIS — J02.9 SORE THROAT: ICD-10-CM

## 2024-10-07 DIAGNOSIS — E66.01 MORBID OBESITY WITH BMI OF 45.0-49.9, ADULT: ICD-10-CM

## 2024-10-07 DIAGNOSIS — E11.65 TYPE 2 DIABETES MELLITUS WITH HYPERGLYCEMIA, WITH LONG-TERM CURRENT USE OF INSULIN (HCC): Primary | ICD-10-CM

## 2024-10-07 DIAGNOSIS — Z79.4 TYPE 2 DIABETES MELLITUS WITH HYPERGLYCEMIA, WITH LONG-TERM CURRENT USE OF INSULIN (HCC): Primary | ICD-10-CM

## 2024-10-07 DIAGNOSIS — I10 ESSENTIAL HYPERTENSION WITH GOAL BLOOD PRESSURE LESS THAN 130/80: ICD-10-CM

## 2024-10-07 DIAGNOSIS — E55.9 VITAMIN D DEFICIENCY, UNSPECIFIED: ICD-10-CM

## 2024-10-07 DIAGNOSIS — J45.40 MODERATE PERSISTENT ASTHMA WITHOUT COMPLICATION: ICD-10-CM

## 2024-10-07 DIAGNOSIS — E78.2 MIXED HYPERLIPIDEMIA: ICD-10-CM

## 2024-10-07 DIAGNOSIS — F41.8 MIXED ANXIETY AND DEPRESSIVE DISORDER: ICD-10-CM

## 2024-10-07 DIAGNOSIS — D50.8 IRON DEFICIENCY ANEMIA SECONDARY TO INADEQUATE DIETARY IRON INTAKE: ICD-10-CM

## 2024-10-07 LAB
EXP DATE SOLUTION: NORMAL
EXP DATE SWAB: NORMAL
EXPIRATION DATE: NORMAL
GROUP A STREP ANTIGEN, POC: NEGATIVE
LOT NUMBER POC: NORMAL
LOT NUMBER SOLUTION: NORMAL
LOT NUMBER SWAB: NORMAL
SARS-COV-2 RNA, POC: NEGATIVE
VALID INTERNAL CONTROL, POC: YES

## 2024-10-07 PROCEDURE — 87635 SARS-COV-2 COVID-19 AMP PRB: CPT | Performed by: INTERNAL MEDICINE

## 2024-10-07 PROCEDURE — 1036F TOBACCO NON-USER: CPT | Performed by: INTERNAL MEDICINE

## 2024-10-07 PROCEDURE — 99214 OFFICE O/P EST MOD 30 MIN: CPT | Performed by: INTERNAL MEDICINE

## 2024-10-07 PROCEDURE — G8417 CALC BMI ABV UP PARAM F/U: HCPCS | Performed by: INTERNAL MEDICINE

## 2024-10-07 PROCEDURE — 2022F DILAT RTA XM EVC RTNOPTHY: CPT | Performed by: INTERNAL MEDICINE

## 2024-10-07 PROCEDURE — 1123F ACP DISCUSS/DSCN MKR DOCD: CPT | Performed by: INTERNAL MEDICINE

## 2024-10-07 PROCEDURE — 3078F DIAST BP <80 MM HG: CPT | Performed by: INTERNAL MEDICINE

## 2024-10-07 PROCEDURE — PBSHW AMB POC COVID-19 COV: Performed by: INTERNAL MEDICINE

## 2024-10-07 PROCEDURE — 87880 STREP A ASSAY W/OPTIC: CPT | Performed by: INTERNAL MEDICINE

## 2024-10-07 PROCEDURE — G8399 PT W/DXA RESULTS DOCUMENT: HCPCS | Performed by: INTERNAL MEDICINE

## 2024-10-07 PROCEDURE — 3051F HG A1C>EQUAL 7.0%<8.0%: CPT | Performed by: INTERNAL MEDICINE

## 2024-10-07 PROCEDURE — 1090F PRES/ABSN URINE INCON ASSESS: CPT | Performed by: INTERNAL MEDICINE

## 2024-10-07 PROCEDURE — G8484 FLU IMMUNIZE NO ADMIN: HCPCS | Performed by: INTERNAL MEDICINE

## 2024-10-07 PROCEDURE — G8427 DOCREV CUR MEDS BY ELIG CLIN: HCPCS | Performed by: INTERNAL MEDICINE

## 2024-10-07 PROCEDURE — 3017F COLORECTAL CA SCREEN DOC REV: CPT | Performed by: INTERNAL MEDICINE

## 2024-10-07 PROCEDURE — G2211 COMPLEX E/M VISIT ADD ON: HCPCS | Performed by: INTERNAL MEDICINE

## 2024-10-07 PROCEDURE — 3074F SYST BP LT 130 MM HG: CPT | Performed by: INTERNAL MEDICINE

## 2024-10-07 PROCEDURE — PBSHW AMB POC RAPID STREP A: Performed by: INTERNAL MEDICINE

## 2024-10-07 RX ORDER — LORATADINE 5 MG/5 ML
1 SOLUTION, ORAL ORAL 3 TIMES DAILY
COMMUNITY
End: 2024-10-07 | Stop reason: SDUPTHER

## 2024-10-07 RX ORDER — LORATADINE 5 MG/5 ML
1 SOLUTION, ORAL ORAL 3 TIMES DAILY
Qty: 300 EACH | Refills: 3 | Status: SHIPPED | OUTPATIENT
Start: 2024-10-07

## 2024-10-07 RX ORDER — ERGOCALCIFEROL 1.25 MG/1
50000 CAPSULE, LIQUID FILLED ORAL WEEKLY
Qty: 12 CAPSULE | Refills: 1 | Status: SHIPPED | OUTPATIENT
Start: 2024-10-07

## 2024-10-07 ASSESSMENT — ENCOUNTER SYMPTOMS
SHORTNESS OF BREATH: 1
SORE THROAT: 0
COLOR CHANGE: 0
ABDOMINAL PAIN: 0
RHINORRHEA: 0
WHEEZING: 1
CHEST TIGHTNESS: 0
NAUSEA: 0
FACIAL SWELLING: 0
COUGH: 1
VOMITING: 0

## 2024-10-07 NOTE — PROGRESS NOTES
Overall Financial Resource Strain (CARDIA)     Difficulty of Paying Living Expenses: Very hard      Food Insecurity: No Food Insecurity (5/23/2024)    Hunger Vital Sign     Worried About Running Out of Food in the Last Year: Never true     Ran Out of Food in the Last Year: Never true             
vitamin D (ERGOCALCIFEROL) 1.25 MG (35337 UT) CAPS capsule; Take 1 capsule by mouth once a week, Disp-12 capsule, R-1Normal  6. Sore throat  -     AMB POC RAPID STREP A  -     AMB POC COVID-19 COV  7. Mixed anxiety and depressive disorder  Plan  Stable on sertraline 100 mg daily, no side effects noted, has refills.  8. Morbid obesity with BMI of 45.0-49.9, adult  9. Iron deficiency anemia secondary to inadequate dietary iron intake  Plan  Hemoglobin and hematocrit are not trending down over the last 6 months, discussed labs in details today.  Encourage patient to eat more greens and salads daily.  Will recheck in 4 months.  Last colonoscopy was normal and patient does not appear to have blood in the urine or stool.        Return in about 4 months (around 2/7/2025) for FU Labs/Meds.       On this date,  I have spent 30 minutes reviewing previous notes, test results and face to face with the patient discussing the diagnosis and importance of compliance with the treatment plan as well as documenting on the day of the visit.      An electronic signature was used to authenticate this note.    --Kasia Porras MD       Treatment risks/benefits/costs/interactions/alternatives discussed with patient.  Advised patient to call back or return to office if symptoms worsen/change/persist. If patient cannot reach us or should anything more severe/urgent arise he/she should proceed directly to the nearest emergency department.  Discussed expected course/resolution/complications of diagnosis in detail with patient.  Patient expressed understanding with the diagnosis and plan.     This dictation may have been completed with Dragon, the computer voice recognition software.  Unanticipated grammatical, syntax, homophones, and other interpretive errors are sometimes inadvertently transcribed by the computer software.  Please disregard any errors that have escaped final proofreading.      Kasia Porras M.D

## 2024-10-22 ENCOUNTER — TELEPHONE (OUTPATIENT)
Age: 69
End: 2024-10-22

## 2024-10-22 NOTE — TELEPHONE ENCOUNTER
Patient called to cancel sleep study that was scheduled for 10/23/2024. She stated that she  received a call saying she would have to pay her bill at the time of the study. She stated that no one informed her of that when she made the appointment. She was made aware that she could call back to reschedule when she is able to.

## 2024-10-25 ENCOUNTER — CLINICAL DOCUMENTATION (OUTPATIENT)
Age: 69
End: 2024-10-25

## 2024-10-25 DIAGNOSIS — G47.33 OSA (OBSTRUCTIVE SLEEP APNEA): Primary | ICD-10-CM

## 2024-12-30 ENCOUNTER — TELEPHONE (OUTPATIENT)
Age: 69
End: 2024-12-30

## 2024-12-30 NOTE — TELEPHONE ENCOUNTER
Patient called stated that she pulled a muscle in her left shoulder would like for the nurse to call.    Requesting a call back    Best call back #[phone]

## 2024-12-31 NOTE — TELEPHONE ENCOUNTER
Patient was scheduled for acute visit on Thursday, but message was also put back in case anything could be sent for her as the pain is very bothersome. C/o Pain in the left shoulder, upper back, and neck. Patient thinks she pulled a muscle. Please advise.

## 2025-01-02 ENCOUNTER — OFFICE VISIT (OUTPATIENT)
Age: 70
End: 2025-01-02
Payer: MEDICARE

## 2025-01-02 VITALS
TEMPERATURE: 97.6 F | DIASTOLIC BLOOD PRESSURE: 68 MMHG | BODY MASS INDEX: 48.49 KG/M2 | HEIGHT: 64 IN | SYSTOLIC BLOOD PRESSURE: 114 MMHG | HEART RATE: 75 BPM | WEIGHT: 284 LBS | OXYGEN SATURATION: 98 %

## 2025-01-02 DIAGNOSIS — M05.79 RHEUMATOID ARTHRITIS INVOLVING MULTIPLE SITES WITH POSITIVE RHEUMATOID FACTOR (HCC): ICD-10-CM

## 2025-01-02 DIAGNOSIS — Z79.4 TYPE 2 DIABETES MELLITUS WITH HYPERGLYCEMIA, WITH LONG-TERM CURRENT USE OF INSULIN (HCC): ICD-10-CM

## 2025-01-02 DIAGNOSIS — E11.42 DM TYPE 2 WITH DIABETIC PERIPHERAL NEUROPATHY (HCC): ICD-10-CM

## 2025-01-02 DIAGNOSIS — E11.65 TYPE 2 DIABETES MELLITUS WITH HYPERGLYCEMIA, WITH LONG-TERM CURRENT USE OF INSULIN (HCC): ICD-10-CM

## 2025-01-02 DIAGNOSIS — S46.912A STRAIN OF LEFT SHOULDER, INITIAL ENCOUNTER: Primary | ICD-10-CM

## 2025-01-02 DIAGNOSIS — E66.01 MORBID (SEVERE) OBESITY DUE TO EXCESS CALORIES: ICD-10-CM

## 2025-01-02 DIAGNOSIS — B37.31 CANDIDAL VAGINITIS: ICD-10-CM

## 2025-01-02 DIAGNOSIS — M62.838 MUSCLE SPASM OF SHOULDER REGION: ICD-10-CM

## 2025-01-02 PROCEDURE — 99214 OFFICE O/P EST MOD 30 MIN: CPT | Performed by: INTERNAL MEDICINE

## 2025-01-02 RX ORDER — LIDOCAINE 50 MG/G
1 PATCH TOPICAL DAILY
Qty: 30 PATCH | Refills: 2 | Status: SHIPPED | OUTPATIENT
Start: 2025-01-02 | End: 2025-04-02

## 2025-01-02 RX ORDER — CYCLOBENZAPRINE HCL 10 MG
10 TABLET ORAL NIGHTLY PRN
Qty: 30 TABLET | Refills: 0 | Status: SHIPPED | OUTPATIENT
Start: 2025-01-02 | End: 2025-02-01

## 2025-01-02 RX ORDER — FLUCONAZOLE 150 MG/1
150 TABLET ORAL
Qty: 2 TABLET | Refills: 0 | Status: SHIPPED | OUTPATIENT
Start: 2025-01-02 | End: 2025-01-08

## 2025-01-02 RX ORDER — MELOXICAM 15 MG/1
15 TABLET ORAL DAILY
Qty: 30 TABLET | Refills: 0 | Status: SHIPPED | OUTPATIENT
Start: 2025-01-02

## 2025-01-02 ASSESSMENT — ENCOUNTER SYMPTOMS
COLOR CHANGE: 0
RHINORRHEA: 0
VOMITING: 0
SORE THROAT: 0
NAUSEA: 0
WHEEZING: 0
COUGH: 0
ABDOMINAL PAIN: 0
CHEST TIGHTNESS: 0
SHORTNESS OF BREATH: 0
FACIAL SWELLING: 0

## 2025-01-02 ASSESSMENT — PATIENT HEALTH QUESTIONNAIRE - PHQ9: DEPRESSION UNABLE TO ASSESS: URGENT/EMERGENT SITUATION

## 2025-01-02 NOTE — PROGRESS NOTES
Chief Complaint   Patient presents with    Muscle Pain     C/o Muscle pain near left shoulder that radiates to upper back and neck x 1 week; mildly improved by aspercreme    Vaginitis     C/o White vaginal discharge and vaginal itchiness x 1 week         1. \"Have you been to the ER or a urgent care clinic since your last visit?  Hospitalized since your last visit?\"    no    2. \"Have you seen or consulted any other health care providers outside of the [facility] since your last visit?\"   no            Click Here for Release of Records Request       Health Maintenance Due   Topic Date Due    Hepatitis C screen  Never done    DTaP/Tdap/Td vaccine (1 - Tdap) Never done    Shingles vaccine (1 of 2) Never done    Respiratory Syncytial Virus (RSV) Pregnant or age 60 yrs+ (1 - Risk 60-74 years 1-dose series) Never done    Diabetic retinal exam  04/07/2016    Diabetic foot exam  05/12/2023    Flu vaccine (1) 08/01/2024    COVID-19 Vaccine (3 - 2023-24 season) 09/01/2024    Annual Wellness Visit (Medicare Advantage)  01/01/2025    Breast cancer screen  12/27/2024 6/12/2024     3:05 PM   PHQ-9    Little interest or pleasure in doing things 0   Feeling down, depressed, or hopeless 0   Trouble falling or staying asleep, or sleeping too much 0   Feeling tired or having little energy 0   Poor appetite or overeating 0   Feeling bad about yourself - or that you are a failure or have let yourself or your family down 0   Trouble concentrating on things, such as reading the newspaper or watching television 0   Moving or speaking so slowly that other people could have noticed. Or the opposite - being so fidgety or restless that you have been moving around a lot more than usual 0   Thoughts that you would be better off dead, or of hurting yourself in some way 0   PHQ-2 Score 0   PHQ-9 Total Score 0   If you checked off any problems, how difficult have these problems made it for you to do your work, take care of 
should anything more severe/urgent arise he/she should proceed directly to the nearest emergency department.  Discussed expected course/resolution/complications of diagnosis in detail with patient.  Patient expressed understanding with the diagnosis and plan.     This dictation may have been completed with Dragon, the computer voice recognition software.  Unanticipated grammatical, syntax, homophones, and other interpretive errors are sometimes inadvertently transcribed by the computer software.  Please disregard any errors that have escaped final proofreading.      Kasia Porras M.D

## 2025-01-20 DIAGNOSIS — Z79.4 TYPE 2 DIABETES MELLITUS WITH HYPERGLYCEMIA, WITH LONG-TERM CURRENT USE OF INSULIN (HCC): ICD-10-CM

## 2025-01-20 DIAGNOSIS — E11.65 TYPE 2 DIABETES MELLITUS WITH HYPERGLYCEMIA, WITH LONG-TERM CURRENT USE OF INSULIN (HCC): ICD-10-CM

## 2025-01-20 DIAGNOSIS — Z79.4 ENCOUNTER FOR LONG-TERM (CURRENT) USE OF INSULIN (HCC): ICD-10-CM

## 2025-01-20 RX ORDER — INSULIN GLARGINE 300 U/ML
INJECTION, SOLUTION SUBCUTANEOUS
Qty: 15 ML | Refills: 4 | Status: SHIPPED | OUTPATIENT
Start: 2025-01-20

## 2025-01-29 DIAGNOSIS — S46.912A STRAIN OF LEFT SHOULDER, INITIAL ENCOUNTER: ICD-10-CM

## 2025-01-30 RX ORDER — MELOXICAM 15 MG/1
15 TABLET ORAL DAILY
Qty: 90 TABLET | Refills: 0 | Status: SHIPPED | OUTPATIENT
Start: 2025-01-30

## 2025-02-04 ENCOUNTER — HOSPITAL ENCOUNTER (OUTPATIENT)
Facility: HOSPITAL | Age: 70
Discharge: HOME | End: 2025-02-07
Payer: MEDICARE

## 2025-02-04 ENCOUNTER — TRANSCRIBE ORDERS (OUTPATIENT)
Facility: HOSPITAL | Age: 70
End: 2025-02-04

## 2025-02-04 DIAGNOSIS — Z12.31 OTHER SCREENING MAMMOGRAM: ICD-10-CM

## 2025-02-04 DIAGNOSIS — Z12.31 OTHER SCREENING MAMMOGRAM: Primary | ICD-10-CM

## 2025-02-04 PROCEDURE — 77063 BREAST TOMOSYNTHESIS BI: CPT

## 2025-02-06 ENCOUNTER — TELEPHONE (OUTPATIENT)
Age: 70
End: 2025-02-06

## 2025-02-06 ENCOUNTER — OFFICE VISIT (OUTPATIENT)
Age: 70
End: 2025-02-06
Payer: MEDICARE

## 2025-02-06 VITALS
SYSTOLIC BLOOD PRESSURE: 136 MMHG | RESPIRATION RATE: 18 BRPM | OXYGEN SATURATION: 95 % | HEART RATE: 77 BPM | WEIGHT: 289 LBS | BODY MASS INDEX: 49.34 KG/M2 | HEIGHT: 64 IN | DIASTOLIC BLOOD PRESSURE: 72 MMHG | TEMPERATURE: 97.1 F

## 2025-02-06 DIAGNOSIS — E55.9 VITAMIN D DEFICIENCY, UNSPECIFIED: ICD-10-CM

## 2025-02-06 DIAGNOSIS — I10 ESSENTIAL HYPERTENSION WITH GOAL BLOOD PRESSURE LESS THAN 130/80: ICD-10-CM

## 2025-02-06 DIAGNOSIS — E11.65 TYPE 2 DIABETES MELLITUS WITH HYPERGLYCEMIA, WITH LONG-TERM CURRENT USE OF INSULIN (HCC): Primary | ICD-10-CM

## 2025-02-06 DIAGNOSIS — R82.90 ABNORMAL URINALYSIS: ICD-10-CM

## 2025-02-06 DIAGNOSIS — Z79.4 TYPE 2 DIABETES MELLITUS WITH HYPERGLYCEMIA, WITH LONG-TERM CURRENT USE OF INSULIN (HCC): ICD-10-CM

## 2025-02-06 DIAGNOSIS — M25.551 RIGHT HIP PAIN: Primary | ICD-10-CM

## 2025-02-06 DIAGNOSIS — E78.2 MIXED HYPERLIPIDEMIA: ICD-10-CM

## 2025-02-06 DIAGNOSIS — Z79.4 TYPE 2 DIABETES MELLITUS WITH HYPERGLYCEMIA, WITH LONG-TERM CURRENT USE OF INSULIN (HCC): Primary | ICD-10-CM

## 2025-02-06 DIAGNOSIS — E11.65 TYPE 2 DIABETES MELLITUS WITH HYPERGLYCEMIA, WITH LONG-TERM CURRENT USE OF INSULIN (HCC): ICD-10-CM

## 2025-02-06 LAB
BILIRUBIN, URINE, POC: NEGATIVE
BLOOD URINE, POC: NORMAL
GLUCOSE URINE, POC: NEGATIVE
KETONES, URINE, POC: NEGATIVE
LEUKOCYTE ESTERASE, URINE, POC: NORMAL
NITRITE, URINE, POC: NEGATIVE
PH, URINE, POC: 5.5 (ref 4.6–8)
PROTEIN,URINE, POC: NEGATIVE
SPECIFIC GRAVITY, URINE, POC: 1.03 (ref 1–1.03)
T4 FREE SERPL-MCNC: 1 NG/DL (ref 0.8–1.5)
TSH SERPL DL<=0.05 MIU/L-ACNC: 1.47 UIU/ML (ref 0.36–3.74)
URINALYSIS CLARITY, POC: NORMAL
URINALYSIS COLOR, POC: YELLOW
UROBILINOGEN, POC: NORMAL MG/DL

## 2025-02-06 PROCEDURE — 1125F AMNT PAIN NOTED PAIN PRSNT: CPT | Performed by: FAMILY MEDICINE

## 2025-02-06 PROCEDURE — 3017F COLORECTAL CA SCREEN DOC REV: CPT | Performed by: FAMILY MEDICINE

## 2025-02-06 PROCEDURE — 1036F TOBACCO NON-USER: CPT | Performed by: FAMILY MEDICINE

## 2025-02-06 PROCEDURE — G8417 CALC BMI ABV UP PARAM F/U: HCPCS | Performed by: FAMILY MEDICINE

## 2025-02-06 PROCEDURE — 2022F DILAT RTA XM EVC RTNOPTHY: CPT | Performed by: FAMILY MEDICINE

## 2025-02-06 PROCEDURE — 3075F SYST BP GE 130 - 139MM HG: CPT | Performed by: FAMILY MEDICINE

## 2025-02-06 PROCEDURE — 99214 OFFICE O/P EST MOD 30 MIN: CPT | Performed by: FAMILY MEDICINE

## 2025-02-06 PROCEDURE — G8399 PT W/DXA RESULTS DOCUMENT: HCPCS | Performed by: FAMILY MEDICINE

## 2025-02-06 PROCEDURE — G8427 DOCREV CUR MEDS BY ELIG CLIN: HCPCS | Performed by: FAMILY MEDICINE

## 2025-02-06 PROCEDURE — PBSHW AMB POC URINALYSIS DIP STICK AUTO W/ MICRO: Performed by: FAMILY MEDICINE

## 2025-02-06 PROCEDURE — 1090F PRES/ABSN URINE INCON ASSESS: CPT | Performed by: FAMILY MEDICINE

## 2025-02-06 PROCEDURE — 1123F ACP DISCUSS/DSCN MKR DOCD: CPT | Performed by: FAMILY MEDICINE

## 2025-02-06 PROCEDURE — 3046F HEMOGLOBIN A1C LEVEL >9.0%: CPT | Performed by: FAMILY MEDICINE

## 2025-02-06 PROCEDURE — 81001 URINALYSIS AUTO W/SCOPE: CPT | Performed by: FAMILY MEDICINE

## 2025-02-06 PROCEDURE — 3078F DIAST BP <80 MM HG: CPT | Performed by: FAMILY MEDICINE

## 2025-02-06 PROCEDURE — 1159F MED LIST DOCD IN RCRD: CPT | Performed by: FAMILY MEDICINE

## 2025-02-06 RX ORDER — METHYLPREDNISOLONE 4 MG/1
TABLET ORAL
Qty: 21 TABLET | Refills: 0 | Status: SHIPPED | OUTPATIENT
Start: 2025-02-06

## 2025-02-06 SDOH — ECONOMIC STABILITY: FOOD INSECURITY: WITHIN THE PAST 12 MONTHS, THE FOOD YOU BOUGHT JUST DIDN'T LAST AND YOU DIDN'T HAVE MONEY TO GET MORE.: NEVER TRUE

## 2025-02-06 SDOH — ECONOMIC STABILITY: FOOD INSECURITY: WITHIN THE PAST 12 MONTHS, YOU WORRIED THAT YOUR FOOD WOULD RUN OUT BEFORE YOU GOT MONEY TO BUY MORE.: NEVER TRUE

## 2025-02-06 ASSESSMENT — PATIENT HEALTH QUESTIONNAIRE - PHQ9
SUM OF ALL RESPONSES TO PHQ QUESTIONS 1-9: 0
4. FEELING TIRED OR HAVING LITTLE ENERGY: NOT AT ALL
1. LITTLE INTEREST OR PLEASURE IN DOING THINGS: NOT AT ALL
7. TROUBLE CONCENTRATING ON THINGS, SUCH AS READING THE NEWSPAPER OR WATCHING TELEVISION: NOT AT ALL
SUM OF ALL RESPONSES TO PHQ QUESTIONS 1-9: 0
SUM OF ALL RESPONSES TO PHQ QUESTIONS 1-9: 0
2. FEELING DOWN, DEPRESSED OR HOPELESS: NOT AT ALL
3. TROUBLE FALLING OR STAYING ASLEEP: NOT AT ALL
8. MOVING OR SPEAKING SO SLOWLY THAT OTHER PEOPLE COULD HAVE NOTICED. OR THE OPPOSITE, BEING SO FIGETY OR RESTLESS THAT YOU HAVE BEEN MOVING AROUND A LOT MORE THAN USUAL: NOT AT ALL
10. IF YOU CHECKED OFF ANY PROBLEMS, HOW DIFFICULT HAVE THESE PROBLEMS MADE IT FOR YOU TO DO YOUR WORK, TAKE CARE OF THINGS AT HOME, OR GET ALONG WITH OTHER PEOPLE: NOT DIFFICULT AT ALL
5. POOR APPETITE OR OVEREATING: NOT AT ALL
6. FEELING BAD ABOUT YOURSELF - OR THAT YOU ARE A FAILURE OR HAVE LET YOURSELF OR YOUR FAMILY DOWN: NOT AT ALL
9. THOUGHTS THAT YOU WOULD BE BETTER OFF DEAD, OR OF HURTING YOURSELF: NOT AT ALL
SUM OF ALL RESPONSES TO PHQ9 QUESTIONS 1 & 2: 0
SUM OF ALL RESPONSES TO PHQ QUESTIONS 1-9: 0

## 2025-02-06 NOTE — TELEPHONE ENCOUNTER
Please put in lab order for pt. She is here today for acute visit with Dr. Martinez and would like to do her labs while here. Her med f/u appt is next week.

## 2025-02-06 NOTE — PROGRESS NOTES
Patient Name: Sandy Zelaya   MRN: [id number]    ASSESSMENT AND PLAN  Sandy Zelaya is a 69 y.o. female who presents today for:    1. Right hip pain  Suspect MSK etiology.  Recommend oral steroids for inflammation and pain control; advised that this may elevate her sugars if she needs to monitor them closely.  Could consider x-ray if persistent.  Follow-up with PCP next week.  - methylPREDNISolone (MEDROL DOSEPACK) 4 MG tablet; Per packet instructions.  Dispense: 21 tablet; Refill: 0  - AMB POC URINALYSIS DIP STICK AUTO W/ MICRO    2. Abnormal urinalysis  UA notable for trace leukocytes; will obtain cluture.  - AMB POC URINALYSIS DIP STICK AUTO W/ MICRO  - Culture, Urine; Future  - Culture, Urine    3. Essential hypertension with goal blood pressure less than 130/80  Stable, continue current treatment.  - TSH + Free T4 Panel  - Comprehensive Metabolic Panel  - CBC with Auto Differential    4. Type 2 diabetes mellitus with hyperglycemia, with long-term current use of insulin (HCC)  Pt to do labs; follow up with PCP.  - Albumin/Creatinine Ratio, Urine  - Hemoglobin A1C    5. Vitamin D deficiency, unspecified  - Vitamin D 25 Hydroxy    6. Mixed hyperlipidemia  - Lipid Panel      Orders Placed This Encounter   Medications    methylPREDNISolone (MEDROL DOSEPACK) 4 MG tablet     Sig: Per packet instructions.     Dispense:  21 tablet     Refill:  0       SUBJECTIVE  Sandy Zelaya is a 69 y.o. female who presents with the following:     Reports ongoing pain along her right buttock, hip, right upper leg since Sunday.  May have pulled a muscle while trying to put her compression stockings on the day before.  Putting pressure on it or riding car makes symptoms worse.  She is taken muscle relaxants, gabapentin, meloxicam, Tylenol, and lidocaine patches with some relief but still continues to have pain.  Denies any dysuria or fevers.   Will be getting labs today through her PCP Dr. Porras; has appt with her next week.     All other ROS

## 2025-02-06 NOTE — PROGRESS NOTES
Chief Complaint   Patient presents with    Back Pain     6/10; about 4-5 days     \"Have you been to the ER, urgent care clinic since your last visit?  Hospitalized since your last visit?\"    NO    “Have you seen or consulted any other health care providers outside of [facility] since your last visit?”    NO       Financial Resource Strain: High Risk (5/23/2024)    Overall Financial Resource Strain (CARDIA)     Difficulty of Paying Living Expenses: Very hard      Food Insecurity: No Food Insecurity (2/6/2025)    Hunger Vital Sign     Worried About Running Out of Food in the Last Year: Never true     Ran Out of Food in the Last Year: Never true            2/6/2025     8:12 AM   PHQ-9    Little interest or pleasure in doing things 0   Feeling down, depressed, or hopeless 0   Trouble falling or staying asleep, or sleeping too much 0   Feeling tired or having little energy 0   Poor appetite or overeating 0   Feeling bad about yourself - or that you are a failure or have let yourself or your family down 0   Trouble concentrating on things, such as reading the newspaper or watching television 0   Moving or speaking so slowly that other people could have noticed. Or the opposite - being so fidgety or restless that you have been moving around a lot more than usual 0   Thoughts that you would be better off dead, or of hurting yourself in some way 0   PHQ-2 Score 0   PHQ-9 Total Score 0   If you checked off any problems, how difficult have these problems made it for you to do your work, take care of things at home, or get along with other people? 0       Health Maintenance Due   Topic Date Due    Hepatitis C screen  Never done    DTaP/Tdap/Td vaccine (1 - Tdap) Never done    Shingles vaccine (1 of 2) Never done    Respiratory Syncytial Virus (RSV) Pregnant or age 60 yrs+ (1 - Risk 60-74 years 1-dose series) Never done    Diabetic retinal exam  04/07/2016    Diabetic foot exam  05/12/2023    Flu vaccine (1)

## 2025-02-07 LAB
25(OH)D3 SERPL-MCNC: 31.6 NG/ML (ref 30–100)
ALBUMIN SERPL-MCNC: 3.7 G/DL (ref 3.5–5)
ALBUMIN/GLOB SERPL: 1.1 (ref 1.1–2.2)
ALP SERPL-CCNC: 112 U/L (ref 45–117)
ALT SERPL-CCNC: 15 U/L (ref 12–78)
ANION GAP SERPL CALC-SCNC: 6 MMOL/L (ref 2–12)
AST SERPL-CCNC: 10 U/L (ref 15–37)
BACTERIA SPEC CULT: NORMAL
BASOPHILS # BLD: 0.03 K/UL (ref 0–0.1)
BASOPHILS NFR BLD: 0.5 % (ref 0–1)
BILIRUB SERPL-MCNC: 0.6 MG/DL (ref 0.2–1)
BUN SERPL-MCNC: 20 MG/DL (ref 6–20)
BUN/CREAT SERPL: 26 (ref 12–20)
CALCIUM SERPL-MCNC: 9.3 MG/DL (ref 8.5–10.1)
CHLORIDE SERPL-SCNC: 109 MMOL/L (ref 97–108)
CHOLEST SERPL-MCNC: 201 MG/DL
CO2 SERPL-SCNC: 27 MMOL/L (ref 21–32)
CREAT SERPL-MCNC: 0.77 MG/DL (ref 0.55–1.02)
CREAT UR-MCNC: 214 MG/DL
DIFFERENTIAL METHOD BLD: ABNORMAL
EOSINOPHIL # BLD: 0.14 K/UL (ref 0–0.4)
EOSINOPHIL NFR BLD: 2.3 % (ref 0–7)
ERYTHROCYTE [DISTWIDTH] IN BLOOD BY AUTOMATED COUNT: 17 % (ref 11.5–14.5)
EST. AVERAGE GLUCOSE BLD GHB EST-MCNC: 154 MG/DL
GLOBULIN SER CALC-MCNC: 3.4 G/DL (ref 2–4)
GLUCOSE SERPL-MCNC: 165 MG/DL (ref 65–100)
HBA1C MFR BLD: 7 % (ref 4–5.6)
HCT VFR BLD AUTO: 30.7 % (ref 35–47)
HDLC SERPL-MCNC: 93 MG/DL
HDLC SERPL: 2.2 (ref 0–5)
HGB BLD-MCNC: 10.7 G/DL (ref 11.5–16)
IMM GRANULOCYTES # BLD AUTO: 0.02 K/UL (ref 0–0.04)
IMM GRANULOCYTES NFR BLD AUTO: 0.3 % (ref 0–0.5)
LDLC SERPL CALC-MCNC: 97.4 MG/DL (ref 0–100)
LYMPHOCYTES # BLD: 1.87 K/UL (ref 0.8–3.5)
LYMPHOCYTES NFR BLD: 30.8 % (ref 12–49)
MCH RBC QN AUTO: 33 PG (ref 26–34)
MCHC RBC AUTO-ENTMCNC: 34.9 G/DL (ref 30–36.5)
MCV RBC AUTO: 94.8 FL (ref 80–99)
MICROALBUMIN UR-MCNC: 1.31 MG/DL
MICROALBUMIN/CREAT UR-RTO: 6 MG/G (ref 0–30)
MONOCYTES # BLD: 0.38 K/UL (ref 0–1)
MONOCYTES NFR BLD: 6.3 % (ref 5–13)
NEUTS SEG # BLD: 3.64 K/UL (ref 1.8–8)
NEUTS SEG NFR BLD: 59.8 % (ref 32–75)
NRBC # BLD: 0 K/UL (ref 0–0.01)
NRBC BLD-RTO: 0 PER 100 WBC
PLATELET # BLD AUTO: 234 K/UL (ref 150–400)
PMV BLD AUTO: 11.2 FL (ref 8.9–12.9)
POTASSIUM SERPL-SCNC: 3.9 MMOL/L (ref 3.5–5.1)
PROT SERPL-MCNC: 7.1 G/DL (ref 6.4–8.2)
RBC # BLD AUTO: 3.24 M/UL (ref 3.8–5.2)
SERVICE CMNT-IMP: NORMAL
SODIUM SERPL-SCNC: 142 MMOL/L (ref 136–145)
TRIGL SERPL-MCNC: 53 MG/DL
VLDLC SERPL CALC-MCNC: 10.6 MG/DL
WBC # BLD AUTO: 6.1 K/UL (ref 3.6–11)

## 2025-02-13 ENCOUNTER — OFFICE VISIT (OUTPATIENT)
Age: 70
End: 2025-02-13
Payer: MEDICARE

## 2025-02-13 VITALS
TEMPERATURE: 97.3 F | DIASTOLIC BLOOD PRESSURE: 68 MMHG | WEIGHT: 289 LBS | SYSTOLIC BLOOD PRESSURE: 108 MMHG | BODY MASS INDEX: 49.34 KG/M2 | HEIGHT: 64 IN | OXYGEN SATURATION: 98 % | HEART RATE: 64 BPM

## 2025-02-13 DIAGNOSIS — E11.65 TYPE 2 DIABETES MELLITUS WITH HYPERGLYCEMIA, WITH LONG-TERM CURRENT USE OF INSULIN (HCC): ICD-10-CM

## 2025-02-13 DIAGNOSIS — M25.551 RIGHT HIP PAIN: ICD-10-CM

## 2025-02-13 DIAGNOSIS — E78.2 MIXED HYPERLIPIDEMIA: ICD-10-CM

## 2025-02-13 DIAGNOSIS — Z79.4 TYPE 2 DIABETES MELLITUS WITH HYPERGLYCEMIA, WITH LONG-TERM CURRENT USE OF INSULIN (HCC): ICD-10-CM

## 2025-02-13 DIAGNOSIS — J45.40 MODERATE PERSISTENT ASTHMA WITHOUT COMPLICATION: ICD-10-CM

## 2025-02-13 DIAGNOSIS — I10 ESSENTIAL HYPERTENSION WITH GOAL BLOOD PRESSURE LESS THAN 130/80: Primary | ICD-10-CM

## 2025-02-13 DIAGNOSIS — E66.01 MORBID OBESITY WITH BMI OF 45.0-49.9, ADULT: ICD-10-CM

## 2025-02-13 DIAGNOSIS — E55.9 VITAMIN D DEFICIENCY, UNSPECIFIED: ICD-10-CM

## 2025-02-13 PROCEDURE — 1159F MED LIST DOCD IN RCRD: CPT | Performed by: INTERNAL MEDICINE

## 2025-02-13 PROCEDURE — 3078F DIAST BP <80 MM HG: CPT | Performed by: INTERNAL MEDICINE

## 2025-02-13 PROCEDURE — 1160F RVW MEDS BY RX/DR IN RCRD: CPT | Performed by: INTERNAL MEDICINE

## 2025-02-13 PROCEDURE — 1123F ACP DISCUSS/DSCN MKR DOCD: CPT | Performed by: INTERNAL MEDICINE

## 2025-02-13 PROCEDURE — 2022F DILAT RTA XM EVC RTNOPTHY: CPT | Performed by: INTERNAL MEDICINE

## 2025-02-13 PROCEDURE — 1036F TOBACCO NON-USER: CPT | Performed by: INTERNAL MEDICINE

## 2025-02-13 PROCEDURE — G8427 DOCREV CUR MEDS BY ELIG CLIN: HCPCS | Performed by: INTERNAL MEDICINE

## 2025-02-13 PROCEDURE — 1090F PRES/ABSN URINE INCON ASSESS: CPT | Performed by: INTERNAL MEDICINE

## 2025-02-13 PROCEDURE — 3051F HG A1C>EQUAL 7.0%<8.0%: CPT | Performed by: INTERNAL MEDICINE

## 2025-02-13 PROCEDURE — G2211 COMPLEX E/M VISIT ADD ON: HCPCS | Performed by: INTERNAL MEDICINE

## 2025-02-13 PROCEDURE — G8399 PT W/DXA RESULTS DOCUMENT: HCPCS | Performed by: INTERNAL MEDICINE

## 2025-02-13 PROCEDURE — 99214 OFFICE O/P EST MOD 30 MIN: CPT | Performed by: INTERNAL MEDICINE

## 2025-02-13 PROCEDURE — G8417 CALC BMI ABV UP PARAM F/U: HCPCS | Performed by: INTERNAL MEDICINE

## 2025-02-13 PROCEDURE — 3074F SYST BP LT 130 MM HG: CPT | Performed by: INTERNAL MEDICINE

## 2025-02-13 PROCEDURE — 3017F COLORECTAL CA SCREEN DOC REV: CPT | Performed by: INTERNAL MEDICINE

## 2025-02-13 RX ORDER — INSULIN ASPART 100 [IU]/ML
INJECTION, SOLUTION INTRAVENOUS; SUBCUTANEOUS
Qty: 15 ML | Refills: 5 | Status: SHIPPED | OUTPATIENT
Start: 2025-02-13

## 2025-02-13 ASSESSMENT — ENCOUNTER SYMPTOMS
RHINORRHEA: 0
ABDOMINAL PAIN: 0
VOMITING: 0
NAUSEA: 0
COLOR CHANGE: 0
SHORTNESS OF BREATH: 0
WHEEZING: 0
FACIAL SWELLING: 0
COUGH: 0
SORE THROAT: 0
CHEST TIGHTNESS: 0

## 2025-02-13 NOTE — PROGRESS NOTES
Chief Complaint   Patient presents with    Hypertension    Diabetes    Asthma    Cholesterol Problem    Anxiety    Hip Pain     C/o Continued right hip pain. Finishes medrol pack given by Dr. Martinez today.         1. \"Have you been to the ER or a urgent care clinic since your last visit?  Hospitalized since your last visit?\"  no      2. \"Have you seen or consulted any other health care providers outside of the [facility] since your last visit?\"   no            Click Here for Release of Records Request       Health Maintenance Due   Topic Date Due    Hepatitis C screen  Never done    DTaP/Tdap/Td vaccine (1 - Tdap) Never done    Shingles vaccine (1 of 2) Never done    Respiratory Syncytial Virus (RSV) Pregnant or age 60 yrs+ (1 - Risk 60-74 years 1-dose series) Never done    Diabetic retinal exam  04/07/2016    Diabetic foot exam  05/12/2023    Flu vaccine (1) 08/01/2024    COVID-19 Vaccine (3 - 2024-25 season) 09/01/2024    Annual Wellness Visit (Medicare Advantage)  01/01/2025 2/6/2025     8:12 AM   PHQ-9    Little interest or pleasure in doing things 0   Feeling down, depressed, or hopeless 0   Trouble falling or staying asleep, or sleeping too much 0   Feeling tired or having little energy 0   Poor appetite or overeating 0   Feeling bad about yourself - or that you are a failure or have let yourself or your family down 0   Trouble concentrating on things, such as reading the newspaper or watching television 0   Moving or speaking so slowly that other people could have noticed. Or the opposite - being so fidgety or restless that you have been moving around a lot more than usual 0   Thoughts that you would be better off dead, or of hurting yourself in some way 0   PHQ-2 Score 0   PHQ-9 Total Score 0   If you checked off any problems, how difficult have these problems made it for you to do your work, take care of things at home, or get along with other people? 0           Financial 
  Tobacco Use    Smoking status: Never    Smokeless tobacco: Never   Vaping Use    Vaping status: Never Used   Substance and Sexual Activity    Alcohol use: No    Drug use: Never    Sexual activity: Not Currently     Partners: Male     Social Determinants of Health     Financial Resource Strain: High Risk (5/23/2024)    Overall Financial Resource Strain (CARDIA)     Difficulty of Paying Living Expenses: Very hard   Food Insecurity: No Food Insecurity (2/6/2025)    Hunger Vital Sign     Worried About Running Out of Food in the Last Year: Never true     Ran Out of Food in the Last Year: Never true   Transportation Needs: No Transportation Needs (2/6/2025)    PRAPARE - Transportation     Lack of Transportation (Medical): No     Lack of Transportation (Non-Medical): No   Physical Activity: Inactive (4/3/2024)    Exercise Vital Sign     Days of Exercise per Week: 2 days     Minutes of Exercise per Session: 0 min   Stress: No Stress Concern Present (5/23/2024)    [employer] [place] [employer] - Occupational Stress Questionnaire     Feeling of Stress : Only a little   Intimate Partner Violence: Not At Risk (5/23/2024)    Humiliation, Afraid, Rape, and Kick questionnaire     Fear of Current or Ex-Partner: No     Emotionally Abused: No     Physically Abused: No     Sexually Abused: No   Housing Stability: Low Risk  (2/6/2025)    Housing Stability Vital Sign     Unable to Pay for Housing in the Last Year: No     Number of Times Moved in the Last Year: 0     Homeless in the Last Year: No        Past Surgical History:   Procedure Laterality Date    CARDIAC PROCEDURE N/A 3/21/2024    Left heart cath / coronary angiography performed by Jose Bailey DO at [facility] CARDIAC CATH LAB    CARPAL TUNNEL RELEASE      both hands    CATARACT EXTRACTION Left 12/11/2019    COLONOSCOPY  09/01/2016    normal, with Dr. Hiro Roldan, repeat in 10y    DILATION AND CURETTAGE OF UTERUS  2011    ENDOMETRIAL BIOPSY  2012    for postmenopausal

## 2025-03-27 DIAGNOSIS — E55.9 VITAMIN D DEFICIENCY, UNSPECIFIED: ICD-10-CM

## 2025-03-27 NOTE — TELEPHONE ENCOUNTER
PCP: Kasia Porras MD    Last appt: 2/13/2025       Future Appointments   Date Time Provider Department Center   6/12/2025  8:15 AM LAB ONLY [facility] DEP   6/19/2025  1:00 PM Kasia Porras MD [facility] DEP       Requested Prescriptions     Pending Prescriptions Disp Refills    vitamin D (ERGOCALCIFEROL) 1.25 MG (78589 UT) CAPS capsule [Pharmacy Med Name: VITAMIN D2 50,000IU (ERGO) CAP RX] 12 capsule 1     Sig: TAKE 1 CAPSULE BY MOUTH 1 TIME A WEEK       Prior labs and Blood pressures:  BP Readings from Last 3 Encounters:   02/13/25 108/68   02/06/25 136/72   01/02/25 114/68     Lab Results   Component Value Date/Time     02/06/2025 08:58 AM    K 3.9 02/06/2025 08:58 AM     02/06/2025 08:58 AM    CO2 27 02/06/2025 08:58 AM    BUN 20 02/06/2025 08:58 AM    GFRAA >60 10/15/2021 09:47 AM     No results found for: \"HBA1C\", \"[id number]\"  Lab Results   Component Value Date/Time    CHOL 201 02/06/2025 08:58 AM    HDL 93 02/06/2025 08:58 AM    LDL 97.4 02/06/2025 08:58 AM    LDL 74.2 11/09/2023 01:33 PM    VLDL 10.6 02/06/2025 08:58 AM    VLDL 9 04/08/2022 12:00 AM     No results found for: \"VITD3\"    Lab Results   Component Value Date/Time    TSH 1.47 02/06/2025 08:58 AM

## 2025-03-28 RX ORDER — ERGOCALCIFEROL 1.25 MG/1
50000 CAPSULE, LIQUID FILLED ORAL WEEKLY
Qty: 12 CAPSULE | Refills: 1 | Status: SHIPPED | OUTPATIENT
Start: 2025-03-28

## 2025-06-12 ENCOUNTER — LAB (OUTPATIENT)
Age: 70
End: 2025-06-12

## 2025-06-12 DIAGNOSIS — E78.2 MIXED HYPERLIPIDEMIA: ICD-10-CM

## 2025-06-12 DIAGNOSIS — E11.65 TYPE 2 DIABETES MELLITUS WITH HYPERGLYCEMIA, WITH LONG-TERM CURRENT USE OF INSULIN (HCC): ICD-10-CM

## 2025-06-12 DIAGNOSIS — I10 ESSENTIAL HYPERTENSION WITH GOAL BLOOD PRESSURE LESS THAN 130/80: ICD-10-CM

## 2025-06-12 DIAGNOSIS — Z79.4 TYPE 2 DIABETES MELLITUS WITH HYPERGLYCEMIA, WITH LONG-TERM CURRENT USE OF INSULIN (HCC): ICD-10-CM

## 2025-06-12 LAB
ALBUMIN SERPL-MCNC: 3.6 G/DL (ref 3.5–5)
ALBUMIN/GLOB SERPL: 1 (ref 1.1–2.2)
ALP SERPL-CCNC: 126 U/L (ref 45–117)
ALT SERPL-CCNC: 18 U/L (ref 12–78)
ANION GAP SERPL CALC-SCNC: 5 MMOL/L (ref 2–12)
AST SERPL-CCNC: 13 U/L (ref 15–37)
BASOPHILS # BLD: 0.04 K/UL (ref 0–0.1)
BASOPHILS NFR BLD: 0.6 % (ref 0–1)
BILIRUB SERPL-MCNC: 0.6 MG/DL (ref 0.2–1)
BUN SERPL-MCNC: 20 MG/DL (ref 6–20)
BUN/CREAT SERPL: 24 (ref 12–20)
CALCIUM SERPL-MCNC: 9.2 MG/DL (ref 8.5–10.1)
CHLORIDE SERPL-SCNC: 109 MMOL/L (ref 97–108)
CHOLEST SERPL-MCNC: 185 MG/DL
CO2 SERPL-SCNC: 27 MMOL/L (ref 21–32)
CREAT SERPL-MCNC: 0.85 MG/DL (ref 0.55–1.02)
CREAT UR-MCNC: 149 MG/DL
DIFFERENTIAL METHOD BLD: ABNORMAL
EOSINOPHIL # BLD: 0.27 K/UL (ref 0–0.4)
EOSINOPHIL NFR BLD: 3.9 % (ref 0–7)
ERYTHROCYTE [DISTWIDTH] IN BLOOD BY AUTOMATED COUNT: 17.3 % (ref 11.5–14.5)
EST. AVERAGE GLUCOSE BLD GHB EST-MCNC: 143 MG/DL
GLOBULIN SER CALC-MCNC: 3.7 G/DL (ref 2–4)
GLUCOSE SERPL-MCNC: 115 MG/DL (ref 65–100)
HBA1C MFR BLD: 6.6 % (ref 4–5.6)
HCT VFR BLD AUTO: 31.4 % (ref 35–47)
HDLC SERPL-MCNC: 88 MG/DL
HDLC SERPL: 2.1 (ref 0–5)
HGB BLD-MCNC: 10.8 G/DL (ref 11.5–16)
IMM GRANULOCYTES # BLD AUTO: 0.01 K/UL (ref 0–0.04)
IMM GRANULOCYTES NFR BLD AUTO: 0.1 % (ref 0–0.5)
LDLC SERPL CALC-MCNC: 88.2 MG/DL (ref 0–100)
LYMPHOCYTES # BLD: 2.87 K/UL (ref 0.8–3.5)
LYMPHOCYTES NFR BLD: 41.7 % (ref 12–49)
MCH RBC QN AUTO: 33.6 PG (ref 26–34)
MCHC RBC AUTO-ENTMCNC: 34.4 G/DL (ref 30–36.5)
MCV RBC AUTO: 97.8 FL (ref 80–99)
MICROALBUMIN UR-MCNC: 0.87 MG/DL
MICROALBUMIN/CREAT UR-RTO: 6 MG/G (ref 0–30)
MONOCYTES # BLD: 0.62 K/UL (ref 0–1)
MONOCYTES NFR BLD: 9 % (ref 5–13)
NEUTS SEG # BLD: 3.07 K/UL (ref 1.8–8)
NEUTS SEG NFR BLD: 44.7 % (ref 32–75)
NRBC # BLD: 0.02 K/UL (ref 0–0.01)
NRBC BLD-RTO: 0.3 PER 100 WBC
PLATELET # BLD AUTO: 268 K/UL (ref 150–400)
PMV BLD AUTO: 11.7 FL (ref 8.9–12.9)
POTASSIUM SERPL-SCNC: 4.4 MMOL/L (ref 3.5–5.1)
PROT SERPL-MCNC: 7.3 G/DL (ref 6.4–8.2)
RBC # BLD AUTO: 3.21 M/UL (ref 3.8–5.2)
SODIUM SERPL-SCNC: 141 MMOL/L (ref 136–145)
TRIGL SERPL-MCNC: 44 MG/DL
VLDLC SERPL CALC-MCNC: 8.8 MG/DL
WBC # BLD AUTO: 6.9 K/UL (ref 3.6–11)

## 2025-06-13 ENCOUNTER — RESULTS FOLLOW-UP (OUTPATIENT)
Age: 70
End: 2025-06-13

## 2025-06-16 SDOH — HEALTH STABILITY: PHYSICAL HEALTH: ON AVERAGE, HOW MANY MINUTES DO YOU ENGAGE IN EXERCISE AT THIS LEVEL?: 10 MIN

## 2025-06-16 SDOH — HEALTH STABILITY: PHYSICAL HEALTH: ON AVERAGE, HOW MANY DAYS PER WEEK DO YOU ENGAGE IN MODERATE TO STRENUOUS EXERCISE (LIKE A BRISK WALK)?: 1 DAY

## 2025-06-16 ASSESSMENT — LIFESTYLE VARIABLES
HOW OFTEN DO YOU HAVE A DRINK CONTAINING ALCOHOL: 2
HOW OFTEN DO YOU HAVE A DRINK CONTAINING ALCOHOL: MONTHLY OR LESS
HOW MANY STANDARD DRINKS CONTAINING ALCOHOL DO YOU HAVE ON A TYPICAL DAY: 1
HOW OFTEN DO YOU HAVE SIX OR MORE DRINKS ON ONE OCCASION: 1
HOW MANY STANDARD DRINKS CONTAINING ALCOHOL DO YOU HAVE ON A TYPICAL DAY: 1 OR 2

## 2025-06-16 ASSESSMENT — PATIENT HEALTH QUESTIONNAIRE - PHQ9
7. TROUBLE CONCENTRATING ON THINGS, SUCH AS READING THE NEWSPAPER OR WATCHING TELEVISION: NOT AT ALL
SUM OF ALL RESPONSES TO PHQ QUESTIONS 1-9: 2
10. IF YOU CHECKED OFF ANY PROBLEMS, HOW DIFFICULT HAVE THESE PROBLEMS MADE IT FOR YOU TO DO YOUR WORK, TAKE CARE OF THINGS AT HOME, OR GET ALONG WITH OTHER PEOPLE: NOT DIFFICULT AT ALL
2. FEELING DOWN, DEPRESSED OR HOPELESS: SEVERAL DAYS
SUM OF ALL RESPONSES TO PHQ QUESTIONS 1-9: 2
6. FEELING BAD ABOUT YOURSELF - OR THAT YOU ARE A FAILURE OR HAVE LET YOURSELF OR YOUR FAMILY DOWN: NOT AT ALL
3. TROUBLE FALLING OR STAYING ASLEEP: NOT AT ALL
SUM OF ALL RESPONSES TO PHQ QUESTIONS 1-9: 2
4. FEELING TIRED OR HAVING LITTLE ENERGY: SEVERAL DAYS
SUM OF ALL RESPONSES TO PHQ QUESTIONS 1-9: 2
5. POOR APPETITE OR OVEREATING: NOT AT ALL
8. MOVING OR SPEAKING SO SLOWLY THAT OTHER PEOPLE COULD HAVE NOTICED. OR THE OPPOSITE, BEING SO FIGETY OR RESTLESS THAT YOU HAVE BEEN MOVING AROUND A LOT MORE THAN USUAL: NOT AT ALL
1. LITTLE INTEREST OR PLEASURE IN DOING THINGS: NOT AT ALL
9. THOUGHTS THAT YOU WOULD BE BETTER OFF DEAD, OR OF HURTING YOURSELF: NOT AT ALL

## 2025-06-19 ENCOUNTER — OFFICE VISIT (OUTPATIENT)
Age: 70
End: 2025-06-19
Payer: MEDICARE

## 2025-06-19 ENCOUNTER — TELEPHONE (OUTPATIENT)
Dept: PHARMACY | Facility: CLINIC | Age: 70
End: 2025-06-19

## 2025-06-19 VITALS
HEIGHT: 64 IN | BODY MASS INDEX: 47.36 KG/M2 | SYSTOLIC BLOOD PRESSURE: 118 MMHG | HEART RATE: 72 BPM | TEMPERATURE: 97.7 F | WEIGHT: 277.4 LBS | OXYGEN SATURATION: 97 % | DIASTOLIC BLOOD PRESSURE: 67 MMHG | RESPIRATION RATE: 18 BRPM

## 2025-06-19 DIAGNOSIS — Z12.11 SCREENING FOR COLON CANCER: ICD-10-CM

## 2025-06-19 DIAGNOSIS — E11.65 TYPE 2 DIABETES MELLITUS WITH HYPERGLYCEMIA, WITH LONG-TERM CURRENT USE OF INSULIN (HCC): ICD-10-CM

## 2025-06-19 DIAGNOSIS — S46.912A STRAIN OF LEFT SHOULDER, INITIAL ENCOUNTER: ICD-10-CM

## 2025-06-19 DIAGNOSIS — E11.42 DM TYPE 2 WITH DIABETIC PERIPHERAL NEUROPATHY (HCC): ICD-10-CM

## 2025-06-19 DIAGNOSIS — I89.0 LYMPHEDEMA: ICD-10-CM

## 2025-06-19 DIAGNOSIS — E66.813 OBESITY, CLASS 3 (HCC): ICD-10-CM

## 2025-06-19 DIAGNOSIS — F41.8 MIXED ANXIETY AND DEPRESSIVE DISORDER: ICD-10-CM

## 2025-06-19 DIAGNOSIS — E78.2 MIXED HYPERLIPIDEMIA: ICD-10-CM

## 2025-06-19 DIAGNOSIS — Z79.4 ENCOUNTER FOR LONG-TERM (CURRENT) USE OF INSULIN (HCC): ICD-10-CM

## 2025-06-19 DIAGNOSIS — Z79.4 TYPE 2 DIABETES MELLITUS WITH HYPERGLYCEMIA, WITH LONG-TERM CURRENT USE OF INSULIN (HCC): ICD-10-CM

## 2025-06-19 DIAGNOSIS — Z00.00 MEDICARE ANNUAL WELLNESS VISIT, SUBSEQUENT: Primary | ICD-10-CM

## 2025-06-19 DIAGNOSIS — J45.40 MODERATE PERSISTENT ASTHMA WITHOUT COMPLICATION: ICD-10-CM

## 2025-06-19 PROCEDURE — 99214 OFFICE O/P EST MOD 30 MIN: CPT | Performed by: INTERNAL MEDICINE

## 2025-06-19 RX ORDER — SERTRALINE HYDROCHLORIDE 100 MG/1
100 TABLET, FILM COATED ORAL DAILY
Qty: 90 TABLET | Refills: 1 | Status: SHIPPED | OUTPATIENT
Start: 2025-06-19

## 2025-06-19 RX ORDER — MELOXICAM 15 MG/1
15 TABLET ORAL DAILY
Qty: 90 TABLET | Refills: 1 | Status: SHIPPED | OUTPATIENT
Start: 2025-06-19

## 2025-06-19 RX ORDER — ROSUVASTATIN CALCIUM 5 MG/1
5 TABLET, COATED ORAL EVERY EVENING
Qty: 90 TABLET | Refills: 1 | Status: SHIPPED | OUTPATIENT
Start: 2025-06-19

## 2025-06-19 RX ORDER — FUROSEMIDE 20 MG/1
20 TABLET ORAL DAILY
Qty: 90 TABLET | Refills: 1 | Status: SHIPPED | OUTPATIENT
Start: 2025-06-19

## 2025-06-19 RX ORDER — INSULIN GLARGINE 300 U/ML
INJECTION, SOLUTION SUBCUTANEOUS
Qty: 15 ML | Refills: 4 | Status: SHIPPED | OUTPATIENT
Start: 2025-06-19

## 2025-06-19 RX ORDER — INSULIN ASPART 100 [IU]/ML
INJECTION, SOLUTION INTRAVENOUS; SUBCUTANEOUS
Qty: 15 ML | Refills: 5 | Status: SHIPPED | OUTPATIENT
Start: 2025-06-19

## 2025-06-19 RX ORDER — ALBUTEROL SULFATE 90 UG/1
2 INHALANT RESPIRATORY (INHALATION) EVERY 6 HOURS PRN
Qty: 18 G | Refills: 3 | Status: SHIPPED | OUTPATIENT
Start: 2025-06-19

## 2025-06-19 ASSESSMENT — ENCOUNTER SYMPTOMS
SORE THROAT: 0
COLOR CHANGE: 0
ABDOMINAL PAIN: 0
VOMITING: 0
NAUSEA: 0
FACIAL SWELLING: 0
COUGH: 0
RHINORRHEA: 0
CHEST TIGHTNESS: 0
WHEEZING: 0
SHORTNESS OF BREATH: 0

## 2025-06-19 NOTE — PROGRESS NOTES
and has not been eating well.  Cardiovascular review  Hypertension--patient has had high blood pressure for over 3 years now, has been taking her medications as prescribed with minimal side effects.  Denies any chest pains but does have chest tightness, denies shortness of breath or palpitations.  Has bilateral leg and ankle swelling which is chronic.  Patient was diagnosed with lymphedema many years ago but has not been to see a vascular specialist in a long time.  Does wear compression stockings daily which helps with the symptoms.  Patient has furosemide as a prescription but only takes it as needed.   Dyslipidemia--patient has had elevated lipids for several years, has been taking the rosuvastatin 5 mg daily with no side effects.  Last lipid check in 11/20/2023 was in the normal range.  Total cholesterol was 193, triglycerides 74, HDL cholesterol 104, and LDL cholesterol was 74.  Pulmonary review  Moderate persistent asthma--patient has had asthma for most of her life, has not been using any inhalers on a daily basis.  Does have chest tightness off and on and uses the albuterol 2-3 times a week.   Psych review  Patient states she has been under a lot of stress recently and her anxiety symptoms have been worse.  Mood overall is stable.  Patient takes her sertraline 100 mg daily with no side effects.       Past Medical History:   Diagnosis Date    Asthma     DM (diabetes mellitus) ([facility])     Encounter for long-term (current) use of insulin ([facility]) 06/30/2016    History of pneumonia 2017    HTN (hypertension)     Hypothyroidism     Mixed anxiety and depressive disorder 12/21/2023    Mixed hyperlipidemia 06/30/2016    Osteoarthritis     Rheumatoid arthritis involving multiple sites with positive rheumatoid factor ([facility]) 12/21/2023    Vitamin D deficiency           Medications     Current Outpatient Medications:     albuterol sulfate HFA (PROVENTIL;VENTOLIN;PROAIR) 108 (90 Base) MCG/ACT inhaler, Inhale 2 puffs into the

## 2025-06-19 NOTE — PATIENT INSTRUCTIONS
Learning About Being Active as an Older Adult  Why is being active important as you get older?     Being active is one of the best things you can do for your health. And it's never too late to start. Being active--or getting active, if you aren't already--has definite benefits. It can:  Give you more energy,  Keep your mind sharp.  Improve balance to reduce your risk of falls.  Help you manage chronic illness with fewer medicines.  No matter how old you are, how fit you are, or what health problems you have, there is a form of activity that will work for you. And the more physical activity you can do, the better your overall health will be.  What kinds of activity can help you stay healthy?  Being more active will make your daily activities easier. Physical activity includes planned exercise and things you do in daily life. There are four types of activity:  Aerobic.  Doing aerobic activity makes your heart and lungs strong.  Includes walking, dancing, and gardening.  Aim for at least 2½ hours spread throughout the week.  It improves your energy and can help you sleep better.  Muscle-strengthening.  This type of activity can help maintain muscle and strengthen bones.  Includes climbing stairs, using resistance bands, and lifting or carrying heavy loads.  Aim for at least twice a week.  It can help protect the knees and other joints.  Stretching.  Stretching gives you better range of motion in joints and muscles.  Includes upper arm stretches, calf stretches, and gentle yoga.  Aim for at least twice a week, preferably after your muscles are warmed up from other activities.  It can help you function better in daily life.  Balancing.  This helps you stay coordinated and have good posture.  Includes heel-to-toe walking, leanna chi, and certain types of yoga.  Aim for at least 3 days a week.  It can reduce your risk of falling.  Even if you have a hard time meeting the recommendations, it's better to be more active

## 2025-06-19 NOTE — TELEPHONE ENCOUNTER
ROSUVASTATIN 5MG TABLETS   Sig: TAKE 1 TABLET BY MOUTH EVERY EVENING   Dispensed: 2024 12:00 AM   Unit strength: 5 mg   Unit form: Tablet Therapy Pack   Days supply: 90   Quantity: 90 each   Refills remainin   Pharmacy: [id number] - [place], [place] - [address] - P [phone] - F [phone]  [address] [address]  Phone: [phone]  Fax: [phone]   Authorizing provider: Kasia Porras MD  [address] [address]  Phone: [phone]  Fax: [phone]       Per Medication List:   Disp Refills Start End    rosuvastatin (CRESTOR) 5 MG tablet 90 tablet 1 2025 --    Sig - Route: Take 1 tablet by mouth every evening - Oral    Sent to pharmacy as: Rosuvastatin Calcium 5 MG Oral Tablet (CRESTOR)    E-Prescribing Status: Receipt confirmed by pharmacy (2025  1:27 PM EDT)        LIPID EVALUATION AND GUIDELINE ASSESSMENT  Lab Results   Component Value Date/Time    CHOL 185 2025 08:30 AM    TRIG 44 2025 08:30 AM    HDL 88 2025 08:30 AM    LDL 88.2 2025 08:30 AM    LDL 74.2 2023 01:33 PM    VLDL 8.8 2025 08:30 AM    VLDL 9 2022 12:00 AM    ALT 18 2025 08:30 AM    AST 13 2025 08:30 AM     The ASCVD Risk score (Man MARTINEZ, et al., 2019) failed to calculate for the following reasons:    Unable to determine if patient is Non-    The 10-year ASCVD risk score (Man MARTINEZ, et al., 2019) is: 21.6%    ASCVD Risk  Plus (2019) (manually entered into site)  Values used to calculate the score:  Age: 70 y.o.   Sex: female   Race:Black /   Unavailable  Systolic Blood Pressure: 118 mmHg  Diastolic Blood Pressure: 67 mmHg  Total Cholesterol: 185 mg/dL  HDL Cholesterol: 88 mg/dL  LDL Cholesterol: 88.2 mg/dL  Hx of Diabetes: Yes  Tobacco smoker:Never  HTN Tx: No  On Statin:Yes  ASA:No    ADA 2018 ACC Guidelines indicate the patient is a

## 2025-08-21 ENCOUNTER — OFFICE VISIT (OUTPATIENT)
Age: 70
End: 2025-08-21
Payer: MEDICARE

## 2025-08-21 VITALS
TEMPERATURE: 98.6 F | OXYGEN SATURATION: 95 % | BODY MASS INDEX: 49.17 KG/M2 | WEIGHT: 288 LBS | HEART RATE: 68 BPM | HEIGHT: 64 IN | SYSTOLIC BLOOD PRESSURE: 126 MMHG | DIASTOLIC BLOOD PRESSURE: 68 MMHG

## 2025-08-21 DIAGNOSIS — S93.491A SPRAIN OF POSTERIOR TALOFIBULAR LIGAMENT OF RIGHT ANKLE, INITIAL ENCOUNTER: ICD-10-CM

## 2025-08-21 DIAGNOSIS — J01.00 ACUTE NON-RECURRENT MAXILLARY SINUSITIS: Primary | ICD-10-CM

## 2025-08-21 DIAGNOSIS — J45.40 MODERATE PERSISTENT ASTHMA WITHOUT COMPLICATION: ICD-10-CM

## 2025-08-21 PROCEDURE — 1123F ACP DISCUSS/DSCN MKR DOCD: CPT | Performed by: INTERNAL MEDICINE

## 2025-08-21 PROCEDURE — 1159F MED LIST DOCD IN RCRD: CPT | Performed by: INTERNAL MEDICINE

## 2025-08-21 PROCEDURE — 99214 OFFICE O/P EST MOD 30 MIN: CPT | Performed by: INTERNAL MEDICINE

## 2025-08-21 PROCEDURE — 1090F PRES/ABSN URINE INCON ASSESS: CPT | Performed by: INTERNAL MEDICINE

## 2025-08-21 PROCEDURE — 1036F TOBACCO NON-USER: CPT | Performed by: INTERNAL MEDICINE

## 2025-08-21 PROCEDURE — G2211 COMPLEX E/M VISIT ADD ON: HCPCS | Performed by: INTERNAL MEDICINE

## 2025-08-21 PROCEDURE — G8417 CALC BMI ABV UP PARAM F/U: HCPCS | Performed by: INTERNAL MEDICINE

## 2025-08-21 PROCEDURE — 3017F COLORECTAL CA SCREEN DOC REV: CPT | Performed by: INTERNAL MEDICINE

## 2025-08-21 PROCEDURE — 1125F AMNT PAIN NOTED PAIN PRSNT: CPT | Performed by: INTERNAL MEDICINE

## 2025-08-21 PROCEDURE — 3078F DIAST BP <80 MM HG: CPT | Performed by: INTERNAL MEDICINE

## 2025-08-21 PROCEDURE — G8427 DOCREV CUR MEDS BY ELIG CLIN: HCPCS | Performed by: INTERNAL MEDICINE

## 2025-08-21 PROCEDURE — 3074F SYST BP LT 130 MM HG: CPT | Performed by: INTERNAL MEDICINE

## 2025-08-21 PROCEDURE — G8399 PT W/DXA RESULTS DOCUMENT: HCPCS | Performed by: INTERNAL MEDICINE

## 2025-08-21 PROCEDURE — 1160F RVW MEDS BY RX/DR IN RCRD: CPT | Performed by: INTERNAL MEDICINE

## 2025-08-21 RX ORDER — DOXYCYCLINE HYCLATE 100 MG
100 TABLET ORAL 2 TIMES DAILY
Qty: 20 TABLET | Refills: 0 | Status: SHIPPED | OUTPATIENT
Start: 2025-08-21 | End: 2025-08-31

## 2025-08-21 RX ORDER — CYCLOBENZAPRINE HCL 10 MG
10 TABLET ORAL NIGHTLY PRN
Qty: 30 TABLET | Refills: 0 | Status: SHIPPED | OUTPATIENT
Start: 2025-08-21 | End: 2025-09-20

## 2025-08-21 RX ORDER — METHYLPREDNISOLONE 4 MG/1
TABLET ORAL
Qty: 21 TABLET | Refills: 0 | Status: SHIPPED | OUTPATIENT
Start: 2025-08-21

## 2025-08-21 ASSESSMENT — ENCOUNTER SYMPTOMS
EYE REDNESS: 0
COUGH: 0
RHINORRHEA: 1
CHEST TIGHTNESS: 0
WHEEZING: 0
BACK PAIN: 0
SINUS PRESSURE: 1
SORE THROAT: 1
SHORTNESS OF BREATH: 0

## (undated) DEVICE — ARGO BAGZ FLUID MANAGEMENT SYSTEM: Brand: ARGO BAGZ FLUID MANAGEMENT SYSTEM

## (undated) DEVICE — CATHETER DIAG 5FR L100CM LUMN ID0.047IN JL3.5 CRV 0 SIDE H

## (undated) DEVICE — CATHETER DIAG 5FR L100CM LUMN ID0.047IN JR4 CRV 0 SIDE H

## (undated) DEVICE — SUPPORT WRST AD W3.5XL9IN DIA14.5IN ART SFT ADJ HK AND LOOP

## (undated) DEVICE — HEART CATH-SFMC: Brand: MEDLINE INDUSTRIES, INC.

## (undated) DEVICE — BAND COMPR L29CM XLN CLR PLAS HEMSTAT EXT HK AND LOOP RETEN

## (undated) DEVICE — GLIDESHEATH SLENDER STAINLESS STEEL KIT: Brand: GLIDESHEATH SLENDER

## (undated) DEVICE — GUIDEWIRE VASC L180CM DIA0.035IN 3MM PTFE J TIP EXCHG FIX

## (undated) DEVICE — KENDALL DL ECG DUAL CONNECT RADIOLUCENT LEAD WIRES, 5-LEAD, SINGLE PATIENT USE: Brand: KENDALL